# Patient Record
Sex: MALE | Race: WHITE | NOT HISPANIC OR LATINO | Employment: FULL TIME | URBAN - METROPOLITAN AREA
[De-identification: names, ages, dates, MRNs, and addresses within clinical notes are randomized per-mention and may not be internally consistent; named-entity substitution may affect disease eponyms.]

---

## 2017-01-13 ENCOUNTER — LAB REQUISITION (OUTPATIENT)
Dept: LAB | Facility: HOSPITAL | Age: 42
End: 2017-01-13
Payer: COMMERCIAL

## 2017-01-13 ENCOUNTER — ALLSCRIPTS OFFICE VISIT (OUTPATIENT)
Dept: OTHER | Facility: OTHER | Age: 42
End: 2017-01-13

## 2017-01-13 DIAGNOSIS — J02.9 ACUTE PHARYNGITIS: ICD-10-CM

## 2017-01-13 PROCEDURE — 87070 CULTURE OTHR SPECIMN AEROBIC: CPT | Performed by: FAMILY MEDICINE

## 2017-01-16 LAB — BACTERIA THROAT CULT: NORMAL

## 2017-07-05 ENCOUNTER — ALLSCRIPTS OFFICE VISIT (OUTPATIENT)
Dept: OTHER | Facility: OTHER | Age: 42
End: 2017-07-05

## 2018-01-12 VITALS
RESPIRATION RATE: 20 BRPM | DIASTOLIC BLOOD PRESSURE: 64 MMHG | OXYGEN SATURATION: 98 % | HEART RATE: 96 BPM | TEMPERATURE: 99.3 F | WEIGHT: 155 LBS | SYSTOLIC BLOOD PRESSURE: 112 MMHG

## 2018-01-14 VITALS
SYSTOLIC BLOOD PRESSURE: 115 MMHG | DIASTOLIC BLOOD PRESSURE: 59 MMHG | HEART RATE: 64 BPM | RESPIRATION RATE: 20 BRPM | TEMPERATURE: 98.1 F | OXYGEN SATURATION: 100 %

## 2018-06-13 ENCOUNTER — APPOINTMENT (OUTPATIENT)
Dept: RADIOLOGY | Facility: CLINIC | Age: 43
End: 2018-06-13
Attending: FAMILY MEDICINE
Payer: COMMERCIAL

## 2018-06-13 ENCOUNTER — OFFICE VISIT (OUTPATIENT)
Dept: URGENT CARE | Facility: CLINIC | Age: 43
End: 2018-06-13
Payer: COMMERCIAL

## 2018-06-13 VITALS
OXYGEN SATURATION: 100 % | TEMPERATURE: 97.5 F | DIASTOLIC BLOOD PRESSURE: 62 MMHG | HEART RATE: 66 BPM | WEIGHT: 134.4 LBS | SYSTOLIC BLOOD PRESSURE: 110 MMHG | BODY MASS INDEX: 17.81 KG/M2 | RESPIRATION RATE: 16 BRPM | HEIGHT: 73 IN

## 2018-06-13 DIAGNOSIS — M79.672 LEFT FOOT PAIN: ICD-10-CM

## 2018-06-13 DIAGNOSIS — S90.32XA CONTUSION OF LEFT HEEL, INITIAL ENCOUNTER: Primary | ICD-10-CM

## 2018-06-13 PROCEDURE — 99213 OFFICE O/P EST LOW 20 MIN: CPT | Performed by: FAMILY MEDICINE

## 2018-06-13 PROCEDURE — 73650 X-RAY EXAM OF HEEL: CPT

## 2018-06-13 RX ORDER — ALPRAZOLAM 1 MG/1
TABLET ORAL
Refills: 2 | COMMUNITY
Start: 2018-05-31 | End: 2019-12-28 | Stop reason: ALTCHOICE

## 2018-06-13 RX ORDER — BUPRENORPHINE HYDROCHLORIDE, NALOXONE HYDROCHLORIDE 8; 2 MG/1; MG/1
FILM, SOLUBLE BUCCAL; SUBLINGUAL
Refills: 0 | COMMUNITY
Start: 2018-05-14 | End: 2019-12-28 | Stop reason: ALTCHOICE

## 2018-06-13 NOTE — LETTER
June 13, 2018     Patient: Vika Maher   YOB: 1975   Date of Visit: 6/13/2018       To Whom it May Concern:    Vika Maher was seen in my clinic on 6/13/2018  If you have any questions or concerns, please don't hesitate to call           Sincerely,          Evelio Noe MD        CC: No Recipients

## 2018-06-13 NOTE — PATIENT INSTRUCTIONS
Left heel contusion   - xray shows no acute fracture   - I have recommended to rest the foot, keep it elevated, apply ice to the site, and take Tylenol or Motrin as needed for pain  - if symptoms persist despite treatment or worsen, follow up w/ pcp for re-check

## 2018-06-13 NOTE — PROGRESS NOTES
[facility]        NAME: Kate Man is a 38 y o  male  : 1975    MRN: [id number]  DATE: 2018  TIME: 10:16 AM    Assessment and Plan   Contusion of left heel, initial encounter [S90 32XA]  1  Contusion of left heel, initial encounter     2  Left foot pain  XR heel / calcaneus 2+ vw left         Patient Instructions     Patient Instructions   Left heel contusion   - xray shows no acute fracture   - I have recommended to rest the foot, keep it elevated, apply ice to the site, and take Tylenol or Motrin as needed for pain  - if symptoms persist despite treatment or worsen, follow up w/ pcp for re-check     Follow up with PCP in 3-5 days  Proceed to  ER if symptoms worsen  Chief Complaint     Chief Complaint   Patient presents with    Foot Injury     Pt here for left foot injury from yesterday, pt states he kicked a tire, Pt states swelling and discoloration of left heel, pain  Pt used no meds  History of Present Illness       39 yo male presents c/o L heel pain  Patient states he was changing his tire yesterday and kicked the tire with his heel and since then he has pain, swelling, and bruising of the heel area  He has pain w/ walking and bearing weight on it  No ankle pain, swelling, or bruising  He has been applying ice to the site  No numbness/tingling or weakness of the foot  Review of Systems   Review of Systems   Constitutional: Negative  Musculoskeletal:        As noted in HPI   Skin:        As noted in HPI   Neurological: Negative            Current Medications       Current Outpatient Prescriptions:     ALPRAZolam (XANAX) 1 mg tablet, TK 1 T PO BID PRN, Disp: , Rfl: 2    SUBOXONE 8-2 MG, DIS 1 FILM UNDER THE TONGUE TID, Disp: , Rfl: 0    Current Allergies     Allergies as of 2018    (No Known Allergies)            The following portions of the patient's history were reviewed and updated as appropriate: allergies, current medications, past family history, past medical history, past social history, past surgical history and problem list      History reviewed  No pertinent past medical history  Past Surgical History:   Procedure Laterality Date    ELBOW ARTHROSCOPY         History reviewed  No pertinent family history  Medications have been verified  Objective   /62 (BP Location: Right arm, Patient Position: Sitting, Cuff Size: Standard)   Pulse 66   Temp 97 5 °F (36 4 °C) (Tympanic)   Resp 16   Ht 6' 1" (1 854 m)   Wt 61 kg (134 lb 6 4 oz)   SpO2 100%   BMI 17 73 kg/m²        Physical Exam     Physical Exam   Constitutional: He is oriented to person, place, and time  Vital signs are normal  He appears well-developed and well-nourished  He is active and cooperative  Non-toxic appearance  He does not have a sickly appearance  He does not appear ill  No distress  Musculoskeletal:   Left foot: + localized area of tenderness, swelling, and bruising on the left heel  Remainder of the foot exam is WNL  Ankle w/ full ROM, no pain or difficulty w/ movement  Neurological: He is alert and oriented to person, place, and time  Skin: Skin is warm, dry and intact  Bruising and ecchymosis noted  He is not diaphoretic  Psychiatric: He has a normal mood and affect  His behavior is normal  Judgment and thought content normal    Nursing note and vitals reviewed

## 2019-02-20 ENCOUNTER — OFFICE VISIT (OUTPATIENT)
Dept: URGENT CARE | Facility: CLINIC | Age: 44
End: 2019-02-20
Payer: COMMERCIAL

## 2019-02-20 VITALS
RESPIRATION RATE: 16 BRPM | WEIGHT: 151.4 LBS | SYSTOLIC BLOOD PRESSURE: 126 MMHG | BODY MASS INDEX: 20.06 KG/M2 | TEMPERATURE: 98.8 F | OXYGEN SATURATION: 100 % | HEART RATE: 84 BPM | HEIGHT: 73 IN | DIASTOLIC BLOOD PRESSURE: 66 MMHG

## 2019-02-20 DIAGNOSIS — J11.1 INFLUENZA: Primary | ICD-10-CM

## 2019-02-20 PROBLEM — J30.9 ALLERGIC RHINITIS DUE TO ALLERGEN: Status: ACTIVE | Noted: 2018-10-01

## 2019-02-20 PROBLEM — G43.809 HEADACHE, VARIANT MIGRAINE: Status: ACTIVE | Noted: 2018-10-01

## 2019-02-20 PROCEDURE — 99213 OFFICE O/P EST LOW 20 MIN: CPT | Performed by: FAMILY MEDICINE

## 2019-02-20 RX ORDER — BUPROPION HYDROCHLORIDE 150 MG/1
TABLET, EXTENDED RELEASE ORAL
Refills: 3 | COMMUNITY
Start: 2019-02-05 | End: 2019-12-28 | Stop reason: ALTCHOICE

## 2019-02-20 RX ORDER — OSELTAMIVIR PHOSPHATE 75 MG/1
75 CAPSULE ORAL 2 TIMES DAILY
Qty: 10 CAPSULE | Refills: 0 | Status: SHIPPED | OUTPATIENT
Start: 2019-02-20 | End: 2019-02-25

## 2019-02-20 RX ORDER — MIRTAZAPINE 15 MG/1
TABLET, FILM COATED ORAL
COMMUNITY
Start: 2018-11-27 | End: 2019-12-28 | Stop reason: ALTCHOICE

## 2019-02-20 NOTE — PROGRESS NOTES
[facility]        NAME: Alyson Saenz is a 41 y o  male  : 1975    MRN: [id number]  DATE: 2019  TIME: 5:14 PM    Assessment and Plan   Influenza [J11 1]  1  Influenza  oseltamivir (TAMIFLU) 75 mg capsule         Patient Instructions     Patient Instructions     1  Influenza   - Tamiflu prescribed, complete as directed, side effects discussed, appropriate warnings given   - rest and drink plenty of fluids  - take Tylenol or Motrin as needed   - try warm salt water gargles and throat lozenges as needed  - run a humidifier at home   - if symptoms persist despite treatment, worsen, or any new symptoms present, should be seen in the ER     Influenza   AMBULATORY CARE:   Influenza  (the flu) is an infection caused by the influenza virus  The flu is easily spread when an infected person coughs, sneezes, or has close contact with others  You may be able to spread the flu to others for 1 week or longer after signs or symptoms appear  Common signs and symptoms include the following:   · Fever and chills    · Headaches, body aches, and muscle or joint pain    · Cough, runny nose, and sore throat    · Loss of appetite, nausea, vomiting, or diarrhea    · Tiredness    · Trouble breathing  Call 911 for any of the following:   · You have trouble breathing, and your lips look purple or blue  · You have a seizure  Seek care immediately if:   · You are dizzy, or you are urinating less or not at all  · You have a headache with a stiff neck, and you feel tired or confused  · You have new pain or pressure in your chest     · Your symptoms, such as shortness of breath, vomiting, or diarrhea, get worse  · Your symptoms, such as fever and coughing, seem to get better, but then get worse  Contact your healthcare provider if:   · You have new muscle pain or weakness  · You have questions or concerns about your condition or care    Treatment for influenza  may include any of the following:  · Acetaminophen  decreases pain and fever  It is available without a doctor's order  Ask how much to take and how often to take it  Follow directions  Acetaminophen can cause liver damage if not taken correctly  · NSAIDs , such as ibuprofen, help decrease swelling, pain, and fever  This medicine is available with or without a doctor's order  NSAIDs can cause stomach bleeding or kidney problems in certain people  If you take blood thinner medicine, always ask your healthcare provider if NSAIDs are safe for you  Always read the medicine label and follow directions  · Antivirals  help fight a viral infection  Manage your symptoms:   · Rest  as much as you can to help you recover  · Drink liquids as directed  to help prevent dehydration  Ask how much liquid to drink each day and which liquids are best for you  Prevent the spread of the flu:   · Wash your hands often  Use soap and water  Wash your hands after you use the bathroom, change a child's diapers, or sneeze  Wash your hands before you prepare or eat food  Use gel hand cleanser when soap and water are not available  Do not touch your eyes, nose, or mouth unless you have washed your hands first            · Cover your mouth when you sneeze or cough  Cough into a tissue or the bend of your arm  · Clean shared items with a germ-killing   Clean table surfaces, doorknobs, and light switches  Do not share towels, silverware, and dishes with people who are sick  Wash bed sheets, towels, silverware, and dishes with soap and water  · Wear a mask  over your mouth and nose if you are sick or are near anyone who is sick  · Stay away from others  if you are sick  · Influenza vaccine  helps prevent influenza (flu)  Everyone older than 6 months should get a yearly influenza vaccine  Get the vaccine as soon as it is available, usually in September or October each year    Follow up with your healthcare provider as directed:  Write down your questions so you remember to ask them during your visits  2017 [address] Information is for End User's use only and may not be sold, redistributed or otherwise used for commercial purposes  All illustrations and images included in CareNotes® are the copyrighted property of [employer]  or [employer]  The above information is an  only  It is not intended as medical advice for individual conditions or treatments  Talk to your doctor, nurse or pharmacist before following any medical regimen to see if it is safe and effective for you  Follow up with PCP in 3-5 days  Proceed to  ER if symptoms worsen  Chief Complaint     Chief Complaint   Patient presents with    Cold Like Symptoms     Pt here ill x 1 day pt states sore throat, fever  101 at times,  bodyaches, stuffy nose, cough, headache  Pt used no meds  Pt has concerns for a   he is fostering  History of Present Illness       40 yo male presents c/o fever of 101 last night and this am, chills, body aches, nasal congestion, rhinorrhea, sore throat, and productive cough  Symptoms have been ongoing x 1 day  No chest pain, SOB, or wheezing  Patient is a smoker  No GI sx  No skin rashes  He did not get the flu vaccine this year  He has been taking Motrin as needed  He states he currently has a  at home under their foster care  Review of Systems   Review of Systems   Constitutional:        As noted in HPI   HENT:        As noted in HPI   Eyes: Negative  Respiratory:        As noted in HPI   Cardiovascular: Negative  Gastrointestinal: Negative  Musculoskeletal:        As noted in HPI   Skin: Negative  Neurological: Negative  Psychiatric/Behavioral: Negative            Current Medications       Current Outpatient Medications:     ALPRAZolam (XANAX) 1 mg tablet, TK 1 T PO BID PRN, Disp: , Rfl: 2    buPROPion (WELLBUTRIN SR) 150 mg 12 hr tablet, TK 1 T PO BID, Disp: , Rfl: 3    mirtazapine (REMERON) 15 mg tablet, TAKE 1 TABLET ONCE DAILY INTHE EVENING BEFORE BEDTIME, Disp: , Rfl:     SUBOXONE 8-2 MG, DIS 1 FILM UNDER THE TONGUE TID, Disp: , Rfl: 0    oseltamivir (TAMIFLU) 75 mg capsule, Take 1 capsule (75 mg total) by mouth 2 (two) times a day for 5 days, Disp: 10 capsule, Rfl: 0    Current Allergies     Allergies as of 02/20/2019    (No Known Allergies)            The following portions of the patient's history were reviewed and updated as appropriate: allergies, current medications, past family history, past medical history, past social history, past surgical history and problem list      Past Medical History:   Diagnosis Date    Allergic rhinitis due to allergen 10/1/2018    Anxiety     Chronic pain     Depression 10/17/2016       Past Surgical History:   Procedure Laterality Date    ELBOW ARTHROSCOPY      ELBOW SURGERY      x2    WRIST SURGERY         History reviewed  No pertinent family history  Medications have been verified  Objective   /66 (BP Location: Right arm, Patient Position: Sitting, Cuff Size: Standard)   Pulse 84   Temp 98 8 °F (37 1 °C) (Tympanic)   Resp 16   Ht 6' 1" (1 854 m)   Wt 68 7 kg (151 lb 6 4 oz)   SpO2 100%   BMI 19 97 kg/m²        Physical Exam     Physical Exam   Constitutional: He is oriented to person, place, and time  Vital signs are normal  He appears well-developed and well-nourished  He is active  Non-toxic appearance  He does not have a sickly appearance  He appears ill  No distress  HENT:   Head: Normocephalic and atraumatic  Right Ear: Tympanic membrane, external ear and ear canal normal    Left Ear: Tympanic membrane, external ear and ear canal normal    Nose: Nose normal    Mouth/Throat: Uvula is midline and mucous membranes are normal  Posterior oropharyngeal erythema present  No oropharyngeal exudate, posterior oropharyngeal edema or tonsillar abscesses     Eyes: Pupils are equal, round, and reactive to light  Conjunctivae and EOM are normal    Neck: Normal range of motion  Neck supple  Cardiovascular: Normal rate, regular rhythm and normal heart sounds  Pulmonary/Chest: Effort normal and breath sounds normal  No respiratory distress  Neurological: He is alert and oriented to person, place, and time  Skin: He is not diaphoretic  Psychiatric: He has a normal mood and affect  His behavior is normal  Judgment and thought content normal    Nursing note and vitals reviewed

## 2019-02-20 NOTE — PATIENT INSTRUCTIONS
1  Influenza   - Tamiflu prescribed, complete as directed, side effects discussed, appropriate warnings given   - rest and drink plenty of fluids  - take Tylenol or Motrin as needed   - try warm salt water gargles and throat lozenges as needed  - run a humidifier at home   - if symptoms persist despite treatment, worsen, or any new symptoms present, should be seen in the ER     Influenza   AMBULATORY CARE:   Influenza  (the flu) is an infection caused by the influenza virus  The flu is easily spread when an infected person coughs, sneezes, or has close contact with others  You may be able to spread the flu to others for 1 week or longer after signs or symptoms appear  Common signs and symptoms include the following:   · Fever and chills    · Headaches, body aches, and muscle or joint pain    · Cough, runny nose, and sore throat    · Loss of appetite, nausea, vomiting, or diarrhea    · Tiredness    · Trouble breathing  Call 911 for any of the following:   · You have trouble breathing, and your lips look purple or blue  · You have a seizure  Seek care immediately if:   · You are dizzy, or you are urinating less or not at all  · You have a headache with a stiff neck, and you feel tired or confused  · You have new pain or pressure in your chest     · Your symptoms, such as shortness of breath, vomiting, or diarrhea, get worse  · Your symptoms, such as fever and coughing, seem to get better, but then get worse  Contact your healthcare provider if:   · You have new muscle pain or weakness  · You have questions or concerns about your condition or care  Treatment for influenza  may include any of the following:  · Acetaminophen  decreases pain and fever  It is available without a doctor's order  Ask how much to take and how often to take it  Follow directions  Acetaminophen can cause liver damage if not taken correctly  · NSAIDs , such as ibuprofen, help decrease swelling, pain, and fever   This medicine is available with or without a doctor's order  NSAIDs can cause stomach bleeding or kidney problems in certain people  If you take blood thinner medicine, always ask your healthcare provider if NSAIDs are safe for you  Always read the medicine label and follow directions  · Antivirals  help fight a viral infection  Manage your symptoms:   · Rest  as much as you can to help you recover  · Drink liquids as directed  to help prevent dehydration  Ask how much liquid to drink each day and which liquids are best for you  Prevent the spread of the flu:   · Wash your hands often  Use soap and water  Wash your hands after you use the bathroom, change a child's diapers, or sneeze  Wash your hands before you prepare or eat food  Use gel hand cleanser when soap and water are not available  Do not touch your eyes, nose, or mouth unless you have washed your hands first            · Cover your mouth when you sneeze or cough  Cough into a tissue or the bend of your arm  · Clean shared items with a germ-killing   Clean table surfaces, doorknobs, and light switches  Do not share towels, silverware, and dishes with people who are sick  Wash bed sheets, towels, silverware, and dishes with soap and water  · Wear a mask  over your mouth and nose if you are sick or are near anyone who is sick  · Stay away from others  if you are sick  · Influenza vaccine  helps prevent influenza (flu)  Everyone older than 6 months should get a yearly influenza vaccine  Get the vaccine as soon as it is available, usually in September or October each year  Follow up with your healthcare provider as directed:  Write down your questions so you remember to ask them during your visits  © 2017 [address] [employer] Information is for End User's use only and may not be sold, redistributed or otherwise used for commercial purposes   All illustrations and images included in CareNotes® are the copyrighted property of [employer]  or Donovan Garcia  The above information is an  only  It is not intended as medical advice for individual conditions or treatments  Talk to your doctor, nurse or pharmacist before following any medical regimen to see if it is safe and effective for you

## 2019-12-28 ENCOUNTER — OFFICE VISIT (OUTPATIENT)
Dept: URGENT CARE | Facility: CLINIC | Age: 44
End: 2019-12-28
Payer: COMMERCIAL

## 2019-12-28 VITALS
HEART RATE: 71 BPM | SYSTOLIC BLOOD PRESSURE: 108 MMHG | TEMPERATURE: 97.1 F | DIASTOLIC BLOOD PRESSURE: 64 MMHG | RESPIRATION RATE: 16 BRPM | OXYGEN SATURATION: 100 % | WEIGHT: 161 LBS | BODY MASS INDEX: 21.24 KG/M2

## 2019-12-28 DIAGNOSIS — J11.1 INFLUENZA-LIKE ILLNESS: Primary | ICD-10-CM

## 2019-12-28 PROCEDURE — 99213 OFFICE O/P EST LOW 20 MIN: CPT | Performed by: NURSE PRACTITIONER

## 2019-12-28 RX ORDER — BUPRENORPHINE AND NALOXONE 8; 2 MG/1; MG/1
FILM, SOLUBLE BUCCAL; SUBLINGUAL
COMMUNITY
Start: 2018-05-14

## 2019-12-28 RX ORDER — OSELTAMIVIR PHOSPHATE 75 MG/1
75 CAPSULE ORAL EVERY 12 HOURS SCHEDULED
Qty: 10 CAPSULE | Refills: 0 | Status: SHIPPED | OUTPATIENT
Start: 2019-12-28 | End: 2020-01-02

## 2019-12-28 RX ORDER — BUPROPION HYDROCHLORIDE 150 MG/1
TABLET, EXTENDED RELEASE ORAL
COMMUNITY
Start: 2019-04-30 | End: 2021-08-18 | Stop reason: ALTCHOICE

## 2019-12-28 RX ORDER — MIRTAZAPINE 15 MG/1
TABLET, FILM COATED ORAL
COMMUNITY
Start: 2019-11-11

## 2019-12-28 RX ORDER — ALBUTEROL SULFATE 90 UG/1
AEROSOL, METERED RESPIRATORY (INHALATION)
Refills: 3 | COMMUNITY
Start: 2019-09-30

## 2019-12-28 RX ORDER — ALPRAZOLAM 1 MG/1
TABLET ORAL
COMMUNITY
Start: 2019-10-14

## 2019-12-28 NOTE — LETTER
December 28, 2019     Patient: Andrez Rivera   YOB: 1975   Date of Visit: 12/28/2019       To Whom It May Concern: It is my medical opinion that Andrez Rivera should remain out of work until 1/1/2020  If you have any questions or concerns, please don't hesitate to call           Sincerely,        LAUREN Valentine    CC: Andrez Rivera

## 2019-12-28 NOTE — PATIENT INSTRUCTIONS
You have been diagnosed with a flu like illness  Tamiflu 75mg twice daily for 5 days  Rest, fluids, tylenol/ibuprofen as needed  Symptomatic treatment  Antibiotics are not indicated at this time  Follow up with PCP in 5-7 days if symptoms not improving

## 2019-12-28 NOTE — PROGRESS NOTES
[facility]        NAME: Sylvia Zelaya is a 45 y o  male  : 1975    MRN: [id number]  DATE: 2019  TIME: 3:30 PM    Assessment and Plan     1  Influenza-like illness  - oseltamivir (TAMIFLU) 75 mg capsule; Take 1 capsule (75 mg total) by mouth every 12 (twelve) hours for 5 days  Dispense: 10 capsule; Refill: 0  Supportive care  Offered flu swab, pt declined  Patient Instructions     You have been diagnosed with a flu like illness  Tamiflu 75mg twice daily for 5 days  Rest, fluids, tylenol/ibuprofen as needed  Symptomatic treatment  Antibiotics are not indicated at this time  Follow up with PCP in 5-7 days if symptoms not improving  Chief Complaint     Chief Complaint   Patient presents with    Cold Like Symptoms     cough, bodyaches, fever, sore throat,sinus/head congestion, PND; started 2 days ago         History of Present Illness       Symptoms started 2 days ago  Sudden onset  Fever, temp max 102 4  Body aches  Nasal congestion  Cough  Headache  Denies n/v/d  Took dayquil and nyquil  Did not get flu shot this year  Review of Systems   Review of Systems   Constitutional: Positive for fatigue and fever  HENT: Positive for congestion, postnasal drip, rhinorrhea, sinus pressure and sore throat  Negative for sinus pain  Respiratory: Positive for cough  Negative for shortness of breath  Gastrointestinal: Negative for diarrhea, nausea and vomiting  Musculoskeletal: Positive for myalgias  Neurological: Positive for headaches  Negative for dizziness  Hematological: Negative for adenopathy           Current Medications       Current Outpatient Medications:     albuterol (PROVENTIL HFA,VENTOLIN HFA) 90 mcg/act inhaler, INL 2 PFS PO Q 4 H PRF WHZ, Disp: , Rfl: 3    ALPRAZolam (XANAX) 1 mg tablet, TAKE 1 TABLET BY MOUTH THREE TIMES A DAY AS NEEDED, Disp: , Rfl:     buprenorphine-naloxone (SUBOXONE) 8-2 mg, DIS 1 FILM UNDER THE TONGUE TID, Disp: , Rfl:     buPROPion (WELLBUTRIN SR) 150 mg 12 hr tablet, TAKE 1 TABLET BY MOUTH TWICE DAILY, Disp: , Rfl:     mirtazapine (REMERON) 15 mg tablet, TAKE 1 TABLET ONCE DAILY INTHE EVENING BEFORE BEDTIME, Disp: , Rfl:     Current Allergies     Allergies as of 12/28/2019    (No Known Allergies)            The following portions of the patient's history were reviewed and updated as appropriate: allergies, current medications, past family history, past medical history, past social history, past surgical history and problem list      Past Medical History:   Diagnosis Date    Allergic rhinitis due to allergen 10/1/2018    Anxiety     Chronic pain     Depression 10/17/2016       Past Surgical History:   Procedure Laterality Date    ELBOW ARTHROSCOPY      ELBOW SURGERY      x2    WRIST SURGERY         History reviewed  No pertinent family history  Medications have been verified  Objective   /64   Pulse 71   Temp (!) 97 1 °F (36 2 °C)   Resp 16   Wt 73 kg (161 lb)   SpO2 100%   BMI 21 24 kg/m²        Physical Exam     Physical Exam   Constitutional: He is oriented to person, place, and time  Ill appearing   HENT:   TMS WNL  Turbinates inflamed  Oropharynx with no erythema or exudate  No sinus tenderness to palpation    (+) PND     Cardiovascular: Normal rate  Pulmonary/Chest: Effort normal and breath sounds normal    Lymphadenopathy:     He has no cervical adenopathy  Neurological: He is alert and oriented to person, place, and time  Skin: Skin is warm and dry  Vitals reviewed

## 2021-06-22 ENCOUNTER — OFFICE VISIT (OUTPATIENT)
Dept: URGENT CARE | Facility: CLINIC | Age: 46
End: 2021-06-22
Payer: COMMERCIAL

## 2021-06-22 VITALS
RESPIRATION RATE: 16 BRPM | TEMPERATURE: 97.7 F | WEIGHT: 172 LBS | BODY MASS INDEX: 22.8 KG/M2 | HEART RATE: 75 BPM | OXYGEN SATURATION: 99 % | HEIGHT: 73 IN

## 2021-06-22 DIAGNOSIS — J06.9 ACUTE URI: Primary | ICD-10-CM

## 2021-06-22 DIAGNOSIS — Z20.822 PERSON UNDER INVESTIGATION FOR COVID-19: ICD-10-CM

## 2021-06-22 PROCEDURE — U0005 INFEC AGEN DETEC AMPLI PROBE: HCPCS | Performed by: FAMILY MEDICINE

## 2021-06-22 PROCEDURE — 99213 OFFICE O/P EST LOW 20 MIN: CPT | Performed by: FAMILY MEDICINE

## 2021-06-22 PROCEDURE — U0003 INFECTIOUS AGENT DETECTION BY NUCLEIC ACID (DNA OR RNA); SEVERE ACUTE RESPIRATORY SYNDROME CORONAVIRUS 2 (SARS-COV-2) (CORONAVIRUS DISEASE [COVID-19]), AMPLIFIED PROBE TECHNIQUE, MAKING USE OF HIGH THROUGHPUT TECHNOLOGIES AS DESCRIBED BY CMS-2020-01-R: HCPCS | Performed by: FAMILY MEDICINE

## 2021-06-22 RX ORDER — ESCITALOPRAM OXALATE 10 MG/1
TABLET ORAL
COMMUNITY
Start: 2021-01-15

## 2021-06-22 RX ORDER — FLUTICASONE PROPIONATE 50 MCG
1 SPRAY, SUSPENSION (ML) NASAL DAILY
Qty: 9.9 ML | Refills: 0 | Status: SHIPPED | OUTPATIENT
Start: 2021-06-22 | End: 2021-08-18 | Stop reason: ALTCHOICE

## 2021-06-22 RX ORDER — BENZONATATE 200 MG/1
200 CAPSULE ORAL 3 TIMES DAILY PRN
Qty: 20 CAPSULE | Refills: 0 | Status: SHIPPED | OUTPATIENT
Start: 2021-06-22 | End: 2021-08-18 | Stop reason: ALTCHOICE

## 2021-06-22 RX ORDER — FREMANEZUMAB-VFRM 225 MG/1.5ML
INJECTION SUBCUTANEOUS
COMMUNITY
Start: 2021-04-06

## 2021-06-22 RX ORDER — TOPIRAMATE 100 MG/1
CAPSULE, EXTENDED RELEASE ORAL
COMMUNITY
Start: 2021-04-01

## 2021-06-22 NOTE — PATIENT INSTRUCTIONS
Acute viral URI (common cold)  - rest and drink plenty of fluids  - take Tylenol or Motrin as needed   - advised warm salt water gargles and throat lozenges as needed   - drink warm tea w/ lemon and honey   - run a humidifier at home   - advised using Flonase nasal spray   - take Tessalon pearls as needed for cough   - COVID-19 test performed in office today, results will be available in 2 days  - patient was informed that he must remain at home on self isolation until test results return, if positive isolation timeline will be extended  - follow up w/ PCP for re-check in 3-5 days   - if symptoms persist despite treatment, worsen, or any new symptoms present, should be seen by PCP for re-check

## 2021-06-22 NOTE — PROGRESS NOTES
[facility]        NAME: Melissa Travis is a 39 y o  male  : 1975    MRN: [id number]  DATE: 2021  TIME: 1:40 PM    Assessment and Plan   Acute URI [J06 9]  1  Acute URI  Novel Coronavirus (Covid-19),[facility]    fluticasone (FLONASE) 50 mcg/act nasal spray    benzonatate (TESSALON) 200 MG capsule   2  Person under investigation for COVID-19           Patient Instructions     Patient Instructions   Acute viral URI (common cold)  - rest and drink plenty of fluids  - take Tylenol or Motrin as needed   - advised warm salt water gargles and throat lozenges as needed   - drink warm tea w/ lemon and honey   - run a humidifier at home   - advised using Flonase nasal spray   - take Tessalon pearls as needed for cough   - COVID-19 test performed in office today, results will be available in 2 days  - patient was informed that he must remain at home on self isolation until test results return, if positive isolation timeline will be extended  - follow up w/ PCP for re-check in 3-5 days   - if symptoms persist despite treatment, worsen, or any new symptoms present, should be seen by PCP for re-check  Follow up with PCP in 3-5 days  Proceed to  ER if symptoms worsen  Chief Complaint     Chief Complaint   Patient presents with    Cold Like Symptoms         History of Present Illness       38 yo male presents c/o nasal congestion, rhinorrhea, post-nasal drip, sore throat, and a productive cough  He has been ill x 4 days  No fever/chills  No headache or body aches  No chest pain, SOB, or wheezing  Non-smoker  No GI sx  No skin rashes  No loss of taste or smell  He denies any recent travel or known exposure to anyone with COVID-19  He has not received the COVID-19 vaccine  He has been taking NyQuil as needed  Review of Systems   Review of Systems   Constitutional: Negative  HENT:        As noted in HPI   Eyes: Negative      Respiratory:        As noted in HPI Cardiovascular: Negative  Gastrointestinal: Negative  Musculoskeletal: Negative  Skin: Negative  Allergic/Immunologic: Negative  Neurological: Negative  Hematological: Negative            Current Medications       Current Outpatient Medications:     Ajovy 225 MG/1 5ML auto-injector, INJECT (225MG) BY SUBCUTANEOUS ROUTE EVERY MONTH IN THE ABDOMEN, THIGH, OR UPPER ARM, Disp: , Rfl:     albuterol (PROVENTIL HFA,VENTOLIN HFA) 90 mcg/act inhaler, INL 2 PFS PO Q 4 H PRF WHZ, Disp: , Rfl: 3    ALPRAZolam (XANAX) 1 mg tablet, TAKE 1 TABLET BY MOUTH THREE TIMES A DAY AS NEEDED, Disp: , Rfl:     escitalopram (LEXAPRO) 10 mg tablet, TAKE 1 TABLET DAILY, Disp: , Rfl:     mirtazapine (REMERON) 15 mg tablet, TAKE 1 TABLET ONCE DAILY INTHE EVENING BEFORE BEDTIME, Disp: , Rfl:     Trokendi  MG CP24, TAKE 1 CAPSULE BY ORAL ROUTE EVERY DAY AT NIGHT, Disp: , Rfl:     benzonatate (TESSALON) 200 MG capsule, Take 1 capsule (200 mg total) by mouth 3 (three) times a day as needed for cough, Disp: 20 capsule, Rfl: 0    buprenorphine-naloxone (SUBOXONE) 8-2 mg, DIS 1 FILM UNDER THE TONGUE TID (Patient not taking: Reported on 6/22/2021), Disp: , Rfl:     buPROPion (WELLBUTRIN SR) 150 mg 12 hr tablet, TAKE 1 TABLET BY MOUTH TWICE DAILY (Patient not taking: Reported on 6/22/2021), Disp: , Rfl:     fluticasone (FLONASE) 50 mcg/act nasal spray, 1 spray into each nostril daily, Disp: 9 9 mL, Rfl: 0    Current Allergies     Allergies as of 06/22/2021    (No Known Allergies)            The following portions of the patient's history were reviewed and updated as appropriate: allergies, current medications, past family history, past medical history, past social history, past surgical history and problem list      Past Medical History:   Diagnosis Date    Allergic rhinitis due to allergen 10/1/2018    Anxiety     Chronic pain     Depression 10/17/2016       Past Surgical History:   Procedure Laterality Date    ELBOW ARTHROSCOPY      ELBOW SURGERY      x2    WRIST SURGERY         History reviewed  No pertinent family history  Medications have been verified  Objective   Pulse 75   Temp 97 7 °F (36 5 °C) (Tympanic)   Resp 16   Ht 6' 1" (1 854 m)   Wt 78 kg (172 lb)   SpO2 99%   BMI 22 69 kg/m²   No LMP for male patient  Physical Exam     Physical Exam  Vitals and nursing note reviewed  Constitutional:       General: He is awake  He is not in acute distress  Appearance: Normal appearance  He is well-developed, well-groomed and normal weight  He is not ill-appearing, toxic-appearing or diaphoretic  HENT:      Head: Normocephalic and atraumatic  Right Ear: Tympanic membrane, ear canal and external ear normal       Left Ear: Tympanic membrane, ear canal and external ear normal       Nose: Mucosal edema and congestion present  Mouth/Throat:      Lips: Pink  Mouth: Mucous membranes are moist       Pharynx: Oropharynx is clear  Uvula midline  Cardiovascular:      Rate and Rhythm: Normal rate and regular rhythm  Pulses: Normal pulses  Heart sounds: Normal heart sounds  Pulmonary:      Effort: Pulmonary effort is normal  No tachypnea, accessory muscle usage or respiratory distress  Breath sounds: Normal breath sounds and air entry  Musculoskeletal:      Cervical back: Normal range of motion and neck supple  No rigidity or tenderness  Lymphadenopathy:      Cervical: No cervical adenopathy  Skin:     General: Skin is warm and dry  Coloration: Skin is not pale  Neurological:      Mental Status: He is alert and oriented to person, place, and time  Mental status is at baseline  Psychiatric:         Attention and Perception: Attention and perception normal          Mood and Affect: Mood and affect normal          Speech: Speech normal          Behavior: Behavior normal  Behavior is cooperative  Thought Content:  Thought content normal  Cognition and Memory: Cognition and memory normal          Judgment: Judgment normal

## 2021-06-23 LAB — SARS-COV-2 RNA RESP QL NAA+PROBE: NEGATIVE

## 2021-08-18 ENCOUNTER — APPOINTMENT (OUTPATIENT)
Dept: RADIOLOGY | Facility: CLINIC | Age: 46
End: 2021-08-18
Attending: FAMILY MEDICINE
Payer: COMMERCIAL

## 2021-08-18 ENCOUNTER — OFFICE VISIT (OUTPATIENT)
Dept: URGENT CARE | Facility: CLINIC | Age: 46
End: 2021-08-18
Payer: COMMERCIAL

## 2021-08-18 VITALS
SYSTOLIC BLOOD PRESSURE: 128 MMHG | DIASTOLIC BLOOD PRESSURE: 74 MMHG | HEIGHT: 73 IN | TEMPERATURE: 97.6 F | BODY MASS INDEX: 22.69 KG/M2 | RESPIRATION RATE: 20 BRPM | HEART RATE: 80 BPM | OXYGEN SATURATION: 97 %

## 2021-08-18 DIAGNOSIS — J20.8 ACUTE BACTERIAL BRONCHITIS: Primary | ICD-10-CM

## 2021-08-18 DIAGNOSIS — B96.89 ACUTE BACTERIAL BRONCHITIS: Primary | ICD-10-CM

## 2021-08-18 DIAGNOSIS — R05.9 COUGH: ICD-10-CM

## 2021-08-18 PROCEDURE — U0003 INFECTIOUS AGENT DETECTION BY NUCLEIC ACID (DNA OR RNA); SEVERE ACUTE RESPIRATORY SYNDROME CORONAVIRUS 2 (SARS-COV-2) (CORONAVIRUS DISEASE [COVID-19]), AMPLIFIED PROBE TECHNIQUE, MAKING USE OF HIGH THROUGHPUT TECHNOLOGIES AS DESCRIBED BY CMS-2020-01-R: HCPCS | Performed by: FAMILY MEDICINE

## 2021-08-18 PROCEDURE — 99213 OFFICE O/P EST LOW 20 MIN: CPT | Performed by: FAMILY MEDICINE

## 2021-08-18 PROCEDURE — U0005 INFEC AGEN DETEC AMPLI PROBE: HCPCS | Performed by: FAMILY MEDICINE

## 2021-08-18 PROCEDURE — 71046 X-RAY EXAM CHEST 2 VIEWS: CPT

## 2021-08-18 RX ORDER — UMECLIDINIUM BROMIDE AND VILANTEROL TRIFENATATE 62.5; 25 UG/1; UG/1
POWDER RESPIRATORY (INHALATION)
COMMUNITY
Start: 2021-07-12

## 2021-08-18 RX ORDER — METHYLPREDNISOLONE 4 MG/1
TABLET ORAL
Qty: 21 TABLET | Refills: 0 | Status: SHIPPED | OUTPATIENT
Start: 2021-08-18

## 2021-08-18 RX ORDER — AMOXICILLIN AND CLAVULANATE POTASSIUM 875; 125 MG/1; MG/1
1 TABLET, FILM COATED ORAL EVERY 12 HOURS SCHEDULED
Qty: 14 TABLET | Refills: 0 | Status: SHIPPED | OUTPATIENT
Start: 2021-08-18 | End: 2021-08-25

## 2021-08-18 RX ORDER — BENZONATATE 200 MG/1
200 CAPSULE ORAL 3 TIMES DAILY PRN
Qty: 20 CAPSULE | Refills: 0 | Status: SHIPPED | OUTPATIENT
Start: 2021-08-18

## 2021-08-18 RX ORDER — FLUTICASONE PROPIONATE 50 MCG
1 SPRAY, SUSPENSION (ML) NASAL DAILY
Qty: 9.9 ML | Refills: 0 | Status: SHIPPED | OUTPATIENT
Start: 2021-08-18

## 2021-08-18 NOTE — PROGRESS NOTES
[facility] [facility]        NAME: Adonis Conway is a 42 y o  male  : 1975    MRN: [id number]  DATE: 2021  TIME: 4:00 PM    Assessment and Plan   Acute bacterial bronchitis [J20 8, B96 89]  1  Acute bacterial bronchitis  XR chest pa & lateral    amoxicillin-clavulanate (AUGMENTIN) 875-125 mg per tablet    methylPREDNISolone 4 MG tablet therapy pack    benzonatate (TESSALON) 200 MG capsule    fluticasone (FLONASE) 50 mcg/act nasal spray    Novel Coronavirus (Covid-19),PCR [facility] - Office Collection         Patient Instructions     Patient Instructions   1  Acute Bronchitis  - CXR shows no active disease  - COVID-19 test performed in office today, patient was informed that he must remain at home on self isolation until test results return  - Augmentin x 7 days prescribed, to be completed as directed   - Medrol dose pack prescribed, to be completed as directed   - continue Albuterol inhaler as needed for wheezing  - Tessalon pearls prescribed to be taken as needed for cough   - advised to use Flonase nasal spray for sinus symptoms   - take Tylenol or Motrin as needed   - drink plenty of fluids and run a humidifier at home   - try warm salt water gargles and throat lozenges as needed   - follow up w/ PCP for re-check in 3-5 days   - if symptoms persist despite treatment, worsen, or any new symptoms present, patient is to be seen in the ER  Follow up with PCP in 3-5 days  Proceed to  ER if symptoms worsen  Chief Complaint     Chief Complaint   Patient presents with    Cold Like Symptoms     x 2 weeks         History of Present Illness       41 yo male presents complaining of nasal congestion, nasal drainage, sore throat, productive cough, and chest congestion  He has been ill x 2 weeks  No fever, Tmax-100 0  He has felt chills and night sweats  No chest pain or SOB, but he has noted wheezing  Patient is an everyday smoker  He has a diagnosis of emphysema  No loss of taste or smell   No GI symptoms  No skin rashes  He denies any recent travel or known exposure to anyone with COVID-19  He has not received the COVID-19 vaccine  He would like to be tested for COVID-19 today  No known allergies  He has been taking Nyquil and Motrin as needed for the symptoms  Review of Systems   Review of Systems   Constitutional:        As noted in HPI   HENT:        As noted in HPI   Eyes: Negative  Respiratory:        As noted in HPI   Cardiovascular: Negative  Gastrointestinal: Negative  Musculoskeletal: Negative  Skin: Negative  Allergic/Immunologic: Negative  Neurological: Negative  Hematological: Negative            Current Medications       Current Outpatient Medications:     Ajovy 225 MG/1 5ML auto-injector, INJECT (225MG) BY SUBCUTANEOUS ROUTE EVERY MONTH IN THE ABDOMEN, THIGH, OR UPPER ARM, Disp: , Rfl:     albuterol (PROVENTIL HFA,VENTOLIN HFA) 90 mcg/act inhaler, INL 2 PFS PO Q 4 H PRF WHZ, Disp: , Rfl: 3    ALPRAZolam (XANAX) 1 mg tablet, TAKE 1 TABLET BY MOUTH THREE TIMES A DAY AS NEEDED, Disp: , Rfl:     escitalopram (LEXAPRO) 10 mg tablet, TAKE 1 TABLET DAILY, Disp: , Rfl:     mirtazapine (REMERON) 15 mg tablet, TAKE 1 TABLET ONCE DAILY INTHE EVENING BEFORE BEDTIME, Disp: , Rfl:     Trokendi  MG CP24, TAKE 1 CAPSULE BY ORAL ROUTE EVERY DAY AT NIGHT, Disp: , Rfl:     amoxicillin-clavulanate (AUGMENTIN) 875-125 mg per tablet, Take 1 tablet by mouth every 12 (twelve) hours for 7 days, Disp: 14 tablet, Rfl: 0    Anoro Ellipta 62 5-25 MCG/INH inhaler, INHALE 1 PUFF BY INHALATION ROUTE EVERY DAY AT THE SAME TIME EACH DAY, Disp: , Rfl:     benzonatate (TESSALON) 200 MG capsule, Take 1 capsule (200 mg total) by mouth 3 (three) times a day as needed for cough, Disp: 20 capsule, Rfl: 0    buprenorphine-naloxone (SUBOXONE) 8-2 mg, DIS 1 FILM UNDER THE TONGUE TID (Patient not taking: Reported on 6/22/2021), Disp: , Rfl:     fluticasone (FLONASE) 50 mcg/act nasal spray, 1 spray into each nostril daily, Disp: 9 9 mL, Rfl: 0    methylPREDNISolone 4 MG tablet therapy pack, Use as directed on package, Disp: 21 tablet, Rfl: 0    Current Allergies     Allergies as of 08/18/2021    (No Known Allergies)            The following portions of the patient's history were reviewed and updated as appropriate: allergies, current medications, past family history, past medical history, past social history, past surgical history and problem list      Past Medical History:   Diagnosis Date    Allergic rhinitis due to allergen 10/1/2018    Anxiety     Chronic pain     Depression 10/17/2016       Past Surgical History:   Procedure Laterality Date    ELBOW ARTHROSCOPY      ELBOW SURGERY      x2    WRIST SURGERY         History reviewed  No pertinent family history  Medications have been verified  Objective   /74 (BP Location: Right arm, Patient Position: Sitting, Cuff Size: Standard)   Pulse 80   Temp 97 6 °F (36 4 °C) (Tympanic)   Resp 20   Ht 6' 1" (1 854 m)   SpO2 97%   BMI 22 69 kg/m²   No LMP for male patient  Physical Exam     Physical Exam  Vitals and nursing note reviewed  Constitutional:       General: He is awake  He is not in acute distress  Appearance: Normal appearance  He is well-developed, well-groomed and normal weight  He is not ill-appearing, toxic-appearing or diaphoretic  HENT:      Head: Normocephalic and atraumatic  Right Ear: Tympanic membrane, ear canal and external ear normal       Left Ear: Tympanic membrane, ear canal and external ear normal       Nose: Mucosal edema and congestion present  Mouth/Throat:      Lips: Pink  Mouth: Mucous membranes are moist       Pharynx: Uvula midline  Posterior oropharyngeal erythema present  No pharyngeal swelling, oropharyngeal exudate or uvula swelling  Tonsils: No tonsillar exudate or tonsillar abscesses     Eyes:      General: Lids are normal       Conjunctiva/sclera: Conjunctivae normal    Cardiovascular:      Rate and Rhythm: Normal rate and regular rhythm  Pulses: Normal pulses  Heart sounds: Normal heart sounds  Pulmonary:      Effort: Pulmonary effort is normal  No tachypnea, accessory muscle usage or respiratory distress  Breath sounds: Normal air entry  Comments: Scattered coarse breath sounds and wheezing in the bilateral upper and lower lobes  Musculoskeletal:      Cervical back: Normal range of motion and neck supple  No rigidity or tenderness  Lymphadenopathy:      Cervical: No cervical adenopathy  Skin:     General: Skin is warm and dry  Coloration: Skin is not pale  Neurological:      Mental Status: He is alert and oriented to person, place, and time  Mental status is at baseline  Psychiatric:         Attention and Perception: Attention and perception normal          Mood and Affect: Mood and affect normal          Speech: Speech normal          Behavior: Behavior normal  Behavior is cooperative  Thought Content:  Thought content normal          Cognition and Memory: Cognition and memory normal          Judgment: Judgment normal

## 2021-08-18 NOTE — PATIENT INSTRUCTIONS
1  Acute Bronchitis  - CXR shows no active disease  - COVID-19 test performed in office today, patient was informed that he must remain at home on self isolation until test results return  - Augmentin x 7 days prescribed, to be completed as directed   - Medrol dose pack prescribed, to be completed as directed   - continue Albuterol inhaler as needed for wheezing  - Tessalon pearls prescribed to be taken as needed for cough   - advised to use Flonase nasal spray for sinus symptoms   - take Tylenol or Motrin as needed   - drink plenty of fluids and run a humidifier at home   - try warm salt water gargles and throat lozenges as needed   - follow up w/ PCP for re-check in 3-5 days   - if symptoms persist despite treatment, worsen, or any new symptoms present, patient is to be seen in the ER

## 2021-08-18 NOTE — LETTER
August 18, 2021     Patient: Judith Sim   YOB: 1975   Date of Visit: 8/18/2021       To Whom it May Concern:    Judith Sim was seen in my clinic on 8/18/2021  Please excuse from work of 08/18 and 08/19  If you have any questions or concerns, please don't hesitate to call           Sincerely,          Annabel Smith MD

## 2021-08-19 LAB — SARS-COV-2 RNA RESP QL NAA+PROBE: NEGATIVE

## 2021-08-20 ENCOUNTER — TELEPHONE (OUTPATIENT)
Dept: URGENT CARE | Facility: CLINIC | Age: 46
End: 2021-08-20

## 2023-07-28 ENCOUNTER — APPOINTMENT (EMERGENCY)
Dept: RADIOLOGY | Facility: HOSPITAL | Age: 48
DRG: 446 | End: 2023-07-28
Payer: COMMERCIAL

## 2023-07-28 ENCOUNTER — HOSPITAL ENCOUNTER (INPATIENT)
Facility: HOSPITAL | Age: 48
LOS: 4 days | Discharge: HOME | DRG: 446 | End: 2023-08-01
Attending: EMERGENCY MEDICINE | Admitting: SPECIALIST
Payer: COMMERCIAL

## 2023-07-28 ENCOUNTER — APPOINTMENT (OUTPATIENT)
Dept: NON INVASIVE DIAGNOSTICS | Facility: HOSPITAL | Age: 48
DRG: 446 | End: 2023-07-28
Attending: RADIOLOGY
Payer: COMMERCIAL

## 2023-07-28 DIAGNOSIS — E87.6 HYPOKALEMIA: ICD-10-CM

## 2023-07-28 DIAGNOSIS — K81.0 ACUTE CHOLECYSTITIS: Primary | ICD-10-CM

## 2023-07-28 DIAGNOSIS — T78.40XA ALLERGIC REACTION, INITIAL ENCOUNTER: ICD-10-CM

## 2023-07-28 DIAGNOSIS — K81.9 CHOLECYSTITIS: ICD-10-CM

## 2023-07-28 DIAGNOSIS — E83.42 HYPOMAGNESEMIA: ICD-10-CM

## 2023-07-28 LAB
ALBUMIN SERPL BCP-MCNC: 3.6 G/DL (ref 3.5–5)
ALP SERPL-CCNC: 93 U/L (ref 34–104)
ALT SERPL W P-5'-P-CCNC: 6 U/L (ref 7–52)
ANION GAP SERPL CALCULATED.3IONS-SCNC: 12 MMOL/L
APTT PPP: 31 SECONDS (ref 23–37)
AST SERPL W P-5'-P-CCNC: 12 U/L (ref 13–39)
BASOPHILS # BLD AUTO: 0.08 THOUSANDS/ÂΜL (ref 0–0.1)
BASOPHILS NFR BLD AUTO: 1 % (ref 0–1)
BILIRUB SERPL-MCNC: 0.48 MG/DL (ref 0.2–1)
BUN SERPL-MCNC: 3 MG/DL (ref 5–25)
CALCIUM SERPL-MCNC: 9.3 MG/DL (ref 8.4–10.2)
CHLORIDE SERPL-SCNC: 89 MMOL/L (ref 96–108)
CO2 SERPL-SCNC: 39 MMOL/L (ref 21–32)
CREAT SERPL-MCNC: 1 MG/DL (ref 0.6–1.3)
EOSINOPHIL # BLD AUTO: 0.11 THOUSAND/ÂΜL (ref 0–0.61)
EOSINOPHIL NFR BLD AUTO: 1 % (ref 0–6)
ERYTHROCYTE [DISTWIDTH] IN BLOOD BY AUTOMATED COUNT: 14 % (ref 11.6–15.1)
GFR SERPL CREATININE-BSD FRML MDRD: 90 ML/MIN/1.73SQ M
GLUCOSE SERPL-MCNC: 125 MG/DL (ref 65–140)
HCT VFR BLD AUTO: 41.1 % (ref 36.5–49.3)
HGB BLD-MCNC: 14.1 G/DL (ref 12–17)
IMM GRANULOCYTES # BLD AUTO: 0.08 THOUSAND/UL (ref 0–0.2)
IMM GRANULOCYTES NFR BLD AUTO: 1 % (ref 0–2)
INR PPP: 1 (ref 0.84–1.19)
LIPASE SERPL-CCNC: 23 U/L (ref 11–82)
LYMPHOCYTES # BLD AUTO: 2.86 THOUSANDS/ÂΜL (ref 0.6–4.47)
LYMPHOCYTES NFR BLD AUTO: 25 % (ref 14–44)
MAGNESIUM SERPL-MCNC: 1.7 MG/DL (ref 1.9–2.7)
MCH RBC QN AUTO: 28.1 PG (ref 26.8–34.3)
MCHC RBC AUTO-ENTMCNC: 34.3 G/DL (ref 31.4–37.4)
MCV RBC AUTO: 82 FL (ref 82–98)
MONOCYTES # BLD AUTO: 0.67 THOUSAND/ÂΜL (ref 0.17–1.22)
MONOCYTES NFR BLD AUTO: 6 % (ref 4–12)
NEUTROPHILS # BLD AUTO: 7.85 THOUSANDS/ÂΜL (ref 1.85–7.62)
NEUTS SEG NFR BLD AUTO: 66 % (ref 43–75)
NRBC BLD AUTO-RTO: 0 /100 WBCS
PLATELET # BLD AUTO: 328 THOUSANDS/UL (ref 149–390)
PMV BLD AUTO: 9.6 FL (ref 8.9–12.7)
POTASSIUM SERPL-SCNC: 2.3 MMOL/L (ref 3.5–5.3)
PROT SERPL-MCNC: 6.8 G/DL (ref 6.4–8.4)
PROTHROMBIN TIME: 13.3 SECONDS (ref 11.6–14.5)
RBC # BLD AUTO: 5.01 MILLION/UL (ref 3.88–5.62)
SODIUM SERPL-SCNC: 140 MMOL/L (ref 135–147)
WBC # BLD AUTO: 11.65 THOUSAND/UL (ref 4.31–10.16)

## 2023-07-28 PROCEDURE — 87070 CULTURE OTHR SPECIMN AEROBIC: CPT | Performed by: SPECIALIST

## 2023-07-28 PROCEDURE — 87077 CULTURE AEROBIC IDENTIFY: CPT | Performed by: SPECIALIST

## 2023-07-28 PROCEDURE — 87075 CULTR BACTERIA EXCEPT BLOOD: CPT | Performed by: SPECIALIST

## 2023-07-28 PROCEDURE — NC001 PR NO CHARGE: Performed by: RADIOLOGY

## 2023-07-28 PROCEDURE — 0F9430Z DRAINAGE OF GALLBLADDER WITH DRAINAGE DEVICE, PERCUTANEOUS APPROACH: ICD-10-PCS | Performed by: RADIOLOGY

## 2023-07-28 PROCEDURE — 99152 MOD SED SAME PHYS/QHP 5/>YRS: CPT

## 2023-07-28 PROCEDURE — 85025 COMPLETE CBC W/AUTO DIFF WBC: CPT | Performed by: PHYSICIAN ASSISTANT

## 2023-07-28 PROCEDURE — 96376 TX/PRO/DX INJ SAME DRUG ADON: CPT

## 2023-07-28 PROCEDURE — 99223 1ST HOSP IP/OBS HIGH 75: CPT | Performed by: SPECIALIST

## 2023-07-28 PROCEDURE — 99285 EMERGENCY DEPT VISIT HI MDM: CPT | Performed by: PHYSICIAN ASSISTANT

## 2023-07-28 PROCEDURE — 47490 INCISION OF GALLBLADDER: CPT

## 2023-07-28 PROCEDURE — 85610 PROTHROMBIN TIME: CPT | Performed by: EMERGENCY MEDICINE

## 2023-07-28 PROCEDURE — 87205 SMEAR GRAM STAIN: CPT | Performed by: SPECIALIST

## 2023-07-28 PROCEDURE — 96366 THER/PROPH/DIAG IV INF ADDON: CPT

## 2023-07-28 PROCEDURE — 99284 EMERGENCY DEPT VISIT MOD MDM: CPT

## 2023-07-28 PROCEDURE — 83735 ASSAY OF MAGNESIUM: CPT | Performed by: PHYSICIAN ASSISTANT

## 2023-07-28 PROCEDURE — C1769 GUIDE WIRE: HCPCS

## 2023-07-28 PROCEDURE — 47490 INCISION OF GALLBLADDER: CPT | Performed by: RADIOLOGY

## 2023-07-28 PROCEDURE — 36415 COLL VENOUS BLD VENIPUNCTURE: CPT | Performed by: PHYSICIAN ASSISTANT

## 2023-07-28 PROCEDURE — C1729 CATH, DRAINAGE: HCPCS

## 2023-07-28 PROCEDURE — 99152 MOD SED SAME PHYS/QHP 5/>YRS: CPT | Performed by: RADIOLOGY

## 2023-07-28 PROCEDURE — 96368 THER/DIAG CONCURRENT INF: CPT

## 2023-07-28 PROCEDURE — 80053 COMPREHEN METABOLIC PANEL: CPT | Performed by: PHYSICIAN ASSISTANT

## 2023-07-28 PROCEDURE — 76705 ECHO EXAM OF ABDOMEN: CPT

## 2023-07-28 PROCEDURE — 96367 TX/PROPH/DG ADDL SEQ IV INF: CPT

## 2023-07-28 PROCEDURE — 85730 THROMBOPLASTIN TIME PARTIAL: CPT | Performed by: EMERGENCY MEDICINE

## 2023-07-28 PROCEDURE — 96365 THER/PROPH/DIAG IV INF INIT: CPT

## 2023-07-28 PROCEDURE — 96375 TX/PRO/DX INJ NEW DRUG ADDON: CPT

## 2023-07-28 PROCEDURE — 83690 ASSAY OF LIPASE: CPT | Performed by: PHYSICIAN ASSISTANT

## 2023-07-28 PROCEDURE — 74177 CT ABD & PELVIS W/CONTRAST: CPT

## 2023-07-28 RX ORDER — METOPROLOL TARTRATE 5 MG/5ML
2.5 INJECTION INTRAVENOUS EVERY 6 HOURS PRN
Status: DISCONTINUED | OUTPATIENT
Start: 2023-07-28 | End: 2023-08-01 | Stop reason: HOSPADM

## 2023-07-28 RX ORDER — POTASSIUM CHLORIDE 29.8 MG/ML
40 INJECTION INTRAVENOUS ONCE
Status: DISCONTINUED | OUTPATIENT
Start: 2023-07-28 | End: 2023-07-28

## 2023-07-28 RX ORDER — POTASSIUM CHLORIDE 14.9 MG/ML
20 INJECTION INTRAVENOUS ONCE
Status: COMPLETED | OUTPATIENT
Start: 2023-07-28 | End: 2023-07-28

## 2023-07-28 RX ORDER — ENOXAPARIN SODIUM 100 MG/ML
40 INJECTION SUBCUTANEOUS DAILY
Status: DISCONTINUED | OUTPATIENT
Start: 2023-07-29 | End: 2023-08-01 | Stop reason: HOSPADM

## 2023-07-28 RX ORDER — HYDROMORPHONE HCL/PF 1 MG/ML
1 SYRINGE (ML) INJECTION ONCE
Status: COMPLETED | OUTPATIENT
Start: 2023-07-28 | End: 2023-07-28

## 2023-07-28 RX ORDER — ALPRAZOLAM 0.5 MG/1
1 TABLET ORAL 3 TIMES DAILY PRN
Status: DISCONTINUED | OUTPATIENT
Start: 2023-07-28 | End: 2023-08-01 | Stop reason: HOSPADM

## 2023-07-28 RX ORDER — DIPHENHYDRAMINE HYDROCHLORIDE 50 MG/ML
50 INJECTION INTRAMUSCULAR; INTRAVENOUS ONCE
Status: COMPLETED | OUTPATIENT
Start: 2023-07-28 | End: 2023-07-28

## 2023-07-28 RX ORDER — FAMOTIDINE 10 MG/ML
40 INJECTION, SOLUTION INTRAVENOUS ONCE
Status: COMPLETED | OUTPATIENT
Start: 2023-07-28 | End: 2023-07-28

## 2023-07-28 RX ORDER — METHYLPREDNISOLONE SODIUM SUCCINATE 125 MG/2ML
125 INJECTION, POWDER, LYOPHILIZED, FOR SOLUTION INTRAMUSCULAR; INTRAVENOUS ONCE
Status: COMPLETED | OUTPATIENT
Start: 2023-07-28 | End: 2023-07-28

## 2023-07-28 RX ORDER — MIDAZOLAM HYDROCHLORIDE 2 MG/2ML
INJECTION, SOLUTION INTRAMUSCULAR; INTRAVENOUS AS NEEDED
Status: COMPLETED | OUTPATIENT
Start: 2023-07-28 | End: 2023-07-28

## 2023-07-28 RX ORDER — SIMETHICONE 80 MG
80 TABLET,CHEWABLE ORAL EVERY 6 HOURS PRN
Status: DISCONTINUED | OUTPATIENT
Start: 2023-07-28 | End: 2023-08-01 | Stop reason: HOSPADM

## 2023-07-28 RX ORDER — ALBUTEROL SULFATE 90 UG/1
1 AEROSOL, METERED RESPIRATORY (INHALATION) EVERY 6 HOURS PRN
Status: DISCONTINUED | OUTPATIENT
Start: 2023-07-28 | End: 2023-08-01 | Stop reason: HOSPADM

## 2023-07-28 RX ORDER — POTASSIUM CHLORIDE 14.9 MG/ML
20 INJECTION INTRAVENOUS ONCE
Status: COMPLETED | OUTPATIENT
Start: 2023-07-28 | End: 2023-07-29

## 2023-07-28 RX ORDER — ONDANSETRON 2 MG/ML
4 INJECTION INTRAMUSCULAR; INTRAVENOUS EVERY 6 HOURS PRN
Status: DISCONTINUED | OUTPATIENT
Start: 2023-07-28 | End: 2023-08-01 | Stop reason: HOSPADM

## 2023-07-28 RX ORDER — MAGNESIUM SULFATE HEPTAHYDRATE 40 MG/ML
2 INJECTION, SOLUTION INTRAVENOUS ONCE
Status: COMPLETED | OUTPATIENT
Start: 2023-07-28 | End: 2023-07-28

## 2023-07-28 RX ORDER — XYLITOL/YERBA SANTA
5 AEROSOL, SPRAY WITH PUMP (ML) MUCOUS MEMBRANE 4 TIMES DAILY PRN
Status: DISCONTINUED | OUTPATIENT
Start: 2023-07-28 | End: 2023-08-01 | Stop reason: HOSPADM

## 2023-07-28 RX ORDER — ACETAMINOPHEN 325 MG/1
650 TABLET ORAL EVERY 6 HOURS PRN
Status: DISCONTINUED | OUTPATIENT
Start: 2023-07-28 | End: 2023-08-01 | Stop reason: HOSPADM

## 2023-07-28 RX ORDER — CALCIUM CARBONATE 500 MG/1
500 TABLET, CHEWABLE ORAL DAILY PRN
Status: DISCONTINUED | OUTPATIENT
Start: 2023-07-28 | End: 2023-08-01 | Stop reason: HOSPADM

## 2023-07-28 RX ORDER — SODIUM CHLORIDE, SODIUM LACTATE, POTASSIUM CHLORIDE, CALCIUM CHLORIDE 600; 310; 30; 20 MG/100ML; MG/100ML; MG/100ML; MG/100ML
75 INJECTION, SOLUTION INTRAVENOUS CONTINUOUS
Status: DISCONTINUED | OUTPATIENT
Start: 2023-07-28 | End: 2023-07-30

## 2023-07-28 RX ORDER — ONDANSETRON 2 MG/ML
4 INJECTION INTRAMUSCULAR; INTRAVENOUS ONCE
Status: COMPLETED | OUTPATIENT
Start: 2023-07-28 | End: 2023-07-28

## 2023-07-28 RX ORDER — HYDROMORPHONE HCL IN WATER/PF 6 MG/30 ML
0.2 PATIENT CONTROLLED ANALGESIA SYRINGE INTRAVENOUS EVERY 4 HOURS PRN
Status: DISCONTINUED | OUTPATIENT
Start: 2023-07-28 | End: 2023-07-29

## 2023-07-28 RX ORDER — ALBUTEROL SULFATE 2.5 MG/3ML
2.5 SOLUTION RESPIRATORY (INHALATION) ONCE
Status: COMPLETED | OUTPATIENT
Start: 2023-07-28 | End: 2023-07-28

## 2023-07-28 RX ORDER — SODIUM CHLORIDE 9 MG/ML
10 INJECTION INTRAVENOUS DAILY
Qty: 900 ML | Refills: 0 | Status: SHIPPED | OUTPATIENT
Start: 2023-07-28 | End: 2023-08-11

## 2023-07-28 RX ORDER — METRONIDAZOLE 500 MG/100ML
500 INJECTION, SOLUTION INTRAVENOUS EVERY 8 HOURS
Status: DISCONTINUED | OUTPATIENT
Start: 2023-07-29 | End: 2023-08-01

## 2023-07-28 RX ORDER — FENTANYL CITRATE 50 UG/ML
INJECTION, SOLUTION INTRAMUSCULAR; INTRAVENOUS AS NEEDED
Status: COMPLETED | OUTPATIENT
Start: 2023-07-28 | End: 2023-07-28

## 2023-07-28 RX ORDER — LIDOCAINE WITH 8.4% SOD BICARB 0.9%(10ML)
SYRINGE (ML) INJECTION AS NEEDED
Status: COMPLETED | OUTPATIENT
Start: 2023-07-28 | End: 2023-07-28

## 2023-07-28 RX ORDER — POTASSIUM CHLORIDE 20MEQ/15ML
40 LIQUID (ML) ORAL ONCE
Status: COMPLETED | OUTPATIENT
Start: 2023-07-28 | End: 2023-07-28

## 2023-07-28 RX ORDER — LEVOFLOXACIN 5 MG/ML
750 INJECTION, SOLUTION INTRAVENOUS EVERY 24 HOURS
Status: DISCONTINUED | OUTPATIENT
Start: 2023-07-28 | End: 2023-08-01

## 2023-07-28 RX ORDER — HYDROMORPHONE HCL/PF 1 MG/ML
0.5 SYRINGE (ML) INJECTION
Status: DISCONTINUED | OUTPATIENT
Start: 2023-07-28 | End: 2023-07-30

## 2023-07-28 RX ORDER — HYDROMORPHONE HCL/PF 1 MG/ML
0.5 SYRINGE (ML) INJECTION ONCE
Status: COMPLETED | OUTPATIENT
Start: 2023-07-28 | End: 2023-07-28

## 2023-07-28 RX ADMIN — IOHEXOL 100 ML: 350 INJECTION, SOLUTION INTRAVENOUS at 11:48

## 2023-07-28 RX ADMIN — POTASSIUM CHLORIDE 20 MEQ: 14.9 INJECTION, SOLUTION INTRAVENOUS at 15:05

## 2023-07-28 RX ADMIN — HYDROMORPHONE HYDROCHLORIDE 0.2 MG: 0.2 INJECTION, SOLUTION INTRAMUSCULAR; INTRAVENOUS; SUBCUTANEOUS at 18:41

## 2023-07-28 RX ADMIN — SODIUM CHLORIDE, SODIUM LACTATE, POTASSIUM CHLORIDE, AND CALCIUM CHLORIDE 1000 ML: .6; .31; .03; .02 INJECTION, SOLUTION INTRAVENOUS at 10:49

## 2023-07-28 RX ADMIN — DIPHENHYDRAMINE HYDROCHLORIDE 50 MG: 50 INJECTION, SOLUTION INTRAMUSCULAR; INTRAVENOUS at 13:50

## 2023-07-28 RX ADMIN — METHYLPREDNISOLONE SODIUM SUCCINATE 125 MG: 125 INJECTION, POWDER, FOR SOLUTION INTRAMUSCULAR; INTRAVENOUS at 13:50

## 2023-07-28 RX ADMIN — FENTANYL CITRATE 25 MCG: 50 INJECTION, SOLUTION INTRAMUSCULAR; INTRAVENOUS at 16:54

## 2023-07-28 RX ADMIN — MIDAZOLAM 0.5 MG: 1 INJECTION INTRAMUSCULAR; INTRAVENOUS at 16:54

## 2023-07-28 RX ADMIN — PIPERACILLIN SODIUM AND TAZOBACTAM SODIUM 4.5 G: 4; .5 INJECTION, POWDER, LYOPHILIZED, FOR SOLUTION INTRAVENOUS at 13:06

## 2023-07-28 RX ADMIN — SODIUM CHLORIDE, SODIUM LACTATE, POTASSIUM CHLORIDE, AND CALCIUM CHLORIDE 125 ML/HR: .6; .31; .03; .02 INJECTION, SOLUTION INTRAVENOUS at 14:33

## 2023-07-28 RX ADMIN — LEVOFLOXACIN 750 MG: 750 INJECTION, SOLUTION INTRAVENOUS at 18:40

## 2023-07-28 RX ADMIN — HYDROMORPHONE HYDROCHLORIDE 0.5 MG: 1 INJECTION, SOLUTION INTRAMUSCULAR; INTRAVENOUS; SUBCUTANEOUS at 10:44

## 2023-07-28 RX ADMIN — HYDROMORPHONE HYDROCHLORIDE 0.5 MG: 1 INJECTION, SOLUTION INTRAMUSCULAR; INTRAVENOUS; SUBCUTANEOUS at 23:18

## 2023-07-28 RX ADMIN — ONDANSETRON 4 MG: 2 INJECTION INTRAMUSCULAR; INTRAVENOUS at 10:42

## 2023-07-28 RX ADMIN — METRONIDAZOLE 500 MG: 500 INJECTION, SOLUTION INTRAVENOUS at 23:19

## 2023-07-28 RX ADMIN — FAMOTIDINE 40 MG: 10 INJECTION, SOLUTION INTRAVENOUS at 13:52

## 2023-07-28 RX ADMIN — MAGNESIUM SULFATE HEPTAHYDRATE 2 G: 40 INJECTION, SOLUTION INTRAVENOUS at 11:58

## 2023-07-28 RX ADMIN — POTASSIUM CHLORIDE 40 MEQ: 1.5 SOLUTION ORAL at 11:57

## 2023-07-28 RX ADMIN — ALBUTEROL SULFATE 2.5 MG: 2.5 SOLUTION RESPIRATORY (INHALATION) at 13:52

## 2023-07-28 RX ADMIN — HYDROMORPHONE HYDROCHLORIDE 1 MG: 1 INJECTION, SOLUTION INTRAMUSCULAR; INTRAVENOUS; SUBCUTANEOUS at 13:06

## 2023-07-28 RX ADMIN — Medication 10 ML: at 17:04

## 2023-07-28 NOTE — ED NOTES
This RN reminded pt again to keep R arm straight to avoid occlusion     Edy Bearden RN  07/28/23 [id number]

## 2023-07-28 NOTE — PLAN OF CARE
Problem: PAIN - ADULT  Goal: Verbalizes/displays adequate comfort level or baseline comfort level  Description: Interventions:  - Encourage patient to monitor pain and request assistance  - Assess pain using appropriate pain scale  - Administer analgesics based on type and severity of pain and evaluate response  - Implement non-pharmacological measures as appropriate and evaluate response  - Consider cultural and social influences on pain and pain management  - Notify physician/advanced practitioner if interventions unsuccessful or patient reports new pain  Outcome: Progressing     Problem: INFECTION - ADULT  Goal: Absence or prevention of progression during hospitalization  Description: INTERVENTIONS:  - Assess and monitor for signs and symptoms of infection  - Monitor lab/diagnostic results  - Monitor all insertion sites, i.e. indwelling lines, tubes, and drains  - Monitor endotracheal if appropriate and nasal secretions for changes in amount and color  - [place] appropriate cooling/warming therapies per order  - Administer medications as ordered  - Instruct and encourage patient and family to use good hand hygiene technique  - Identify and instruct in appropriate isolation precautions for identified infection/condition  Outcome: Progressing  Goal: Absence of fever/infection during neutropenic period  Description: INTERVENTIONS:  - Monitor WBC    Outcome: Progressing     Problem: GASTROINTESTINAL - ADULT  Goal: Minimal or absence of nausea and/or vomiting  Description: INTERVENTIONS:  - Administer IV fluids if ordered to ensure adequate hydration  - Maintain NPO status until nausea and vomiting are resolved  - Nasogastric tube if ordered  - Administer ordered antiemetic medications as needed  - Provide nonpharmacologic comfort measures as appropriate  - Advance diet as tolerated, if ordered  - Consider nutrition services referral to assist patient with adequate nutrition and appropriate food choices  Outcome: Progressing  Goal: Maintains or returns to baseline bowel function  Description: INTERVENTIONS:  - Assess bowel function  - Encourage oral fluids to ensure adequate hydration  - Administer IV fluids if ordered to ensure adequate hydration  - Administer ordered medications as needed  - Encourage mobilization and activity  - Consider nutritional services referral to assist patient with adequate nutrition and appropriate food choices  Outcome: Progressing  Goal: Maintains adequate nutritional intake  Description: INTERVENTIONS:  - Monitor percentage of each meal consumed  - Identify factors contributing to decreased intake, treat as appropriate  - Assist with meals as needed  - Monitor I&O, weight, and lab values if indicated  - Obtain nutrition services referral as needed  Outcome: Progressing

## 2023-07-28 NOTE — CONSULTS
e-Consult (IPC)  - Interventional Radiology  Thornell Paget 41 y.o. male MRN: [id number]  Unit/Bed#: ED 06 Encounter: [id number]    Interventional Radiology has been consulted to evaluate Thornell Paget    We were consulted by Ruperto Tatum PA-C concerning this patient with abdominal pain. .    Inpatient Consult to IR  Consult performed by: Elena Sheridan MD  Consult ordered by: Ruperto Tatum PA-C        07/28/23    Assessment/Recommendation:   Patient with abdominal pain for 10 days now with acute cholecystitis. IR to place a percutaneous cholecystostomy tube today. 21-30 minutes, >50% of the total time devoted to medical consultative verbal/EMR discussion between providers. Written report will be generated in the EMR. Thank you for allowing Interventional Radiology to participate in the care of Thornell Paget. Please don't hesitate to call or TigerText us with any questions.      Elena Sheridan MD

## 2023-07-28 NOTE — ED NOTES
Pt on Zosyn, this nurse educated pt to keep R arm straight to avoid occlusion of meds     Frankie Spatz, RN  07/28/23 0913

## 2023-07-28 NOTE — DISCHARGE INSTRUCTIONS
TUBE CARE INSTRUCTIONS    Care after your procedure:    Resume your normal diet. Small sips of flat soda will help with nausea. 1. The properly functioning catheter should be forward flushed once (1x) daily with 10ml of normal saline using clean technique. You will be given a prescription for flushes. To flush the tube, clean both connections with alcohol swab. Twist off the drainage bag/ bulb  tubing and twist the saline syringe into the drainage tube and flush. Remove the syringe and twist the drainage bag / bulb tubing tubing back on.    2. The drainage bag/bulb may be emptied as necessary. Keep a record of the amount of fluid you drain from your tube. This should be done with clean technique as well. 3. A fresh dressing should be applied daily over the tube insertion site. 4. As the tube is secured to the skin with only a suture,try not to pull on your tube. Tub baths are not permitted. Showers are permitted if the patient's skin entry site is prevented from getting wet. Similarly, washcloth "baths" are acceptable. Contact Interventional Radiology at [phone] if:    1. Leakage or large amounts of liquid around the catheter. 2. Fever of 101 degrees lasting several hours without other obvious cause (such as sore throat, flu, etc). 3. Persistent nausea or vomiting. 4. Diminished drainage, which may be associated with pressure or pain. Or when the drainage from your tube is less than 10mls for 48 hours. 5. Catheter pulled back or falls out. The following pharmacies carry the flush syringes.        [facility] AND [facility] [place], operated by [facility]                         [phone]  [facility]  Phone [phone]            Phone [phone] [address]                                 [phone]  [address] [place]  Phone [phone]            Phone [phone]                      Jose Jenkins                                                                                                          [phone]  [facility]  [address] [place] [address].   [address] [facility] [address]                                                                               [address] [facility]  Phone [phone]

## 2023-07-28 NOTE — ED NOTES
This RN made Julisa Head aware pt is having allergen reaction while on Zosyn IV. This RN stopped  Zosyn IV.      Melissa Caraballo RN  07/28/23 3193

## 2023-07-28 NOTE — H&P
H&P Exam - General Surgery   Maddie Garrison 41 y.o. male MRN: [id number]  Unit/Bed#: ED 06 Encounter: [id number]    Assessment/Plan     Assessment:  · Acute cholecystitis with symptoms for almost two weeks -- CT scan revealed GB with thickened wall and some surrounding fluid, possible septations vs single small stone at the infundibulum? and dilated CBD. Afebrile, vitals stable. WBC Count 11.6, LFTs within normal limits  · Zosyn allergy -- in ED patient had full body flushing and SOB after zosyn administration        Plan:  RUQ ultrasound pending  Potassium and magnesium repletion   NPO, sips with meds  IV fluid hydration  IR consult for percutaneous cholecystostomy tube   Solu-medrol, Benadryl, Pepcid given for allergic reaction to zosyn  Will avoid ancef for now due to new found allergy, start Levaquin/flagyl   Med rec in chart is incorrect and will need updated  Nicotine patch           History of Present Illness     HPI:  Maddie Garrison is a 41 y.o. male whom is a current every day smoker with history of long COVID, anxiety, and depression who presents with almost two weeks of nausea, vomiting, and RUQ pain. Patient had some episodes of loose stool but no BM since two days ago. Pain comes in waves and has always been located in the RUQ and associated with some abdominal bloating. Pain is worse with bending over or sitting up from a lying position. Patient last ate any food 6 days ago. Has only been drinking some water. Last episode of emesis was two days ago. Denies any fevers or chills. Over the past year has decreased from 3 packs of cigarettes a day to less than 1. Recently tried marijuana but only once. Patient states he takes metoprolol daily, some sort of anti-depressant medication daily, and Xanax as needed. Review of Systems   Constitutional: Positive for appetite change. Negative for chills, fatigue and fever.    HENT: Negative for congestion, rhinorrhea, sneezing, sore throat and trouble swallowing. Eyes: Negative. Respiratory: Negative for cough, chest tightness, shortness of breath and wheezing. Cardiovascular: Negative for chest pain, palpitations and leg swelling. Gastrointestinal: Positive for abdominal distention, abdominal pain, diarrhea, nausea and vomiting. Negative for blood in stool. Genitourinary: Negative for difficulty urinating, dysuria, flank pain and hematuria. Musculoskeletal: Negative. Skin: Negative for rash and wound. Neurological: Negative for dizziness, syncope, weakness, light-headedness and headaches. Hematological: Negative. Psychiatric/Behavioral: Negative. Historical Information   Past Medical History:   Diagnosis Date   • Allergic rhinitis due to allergen 10/1/2018   • Anxiety    • Chronic pain    • Depression 10/17/2016     Past Surgical History:   Procedure Laterality Date   • ELBOW ARTHROSCOPY     • ELBOW SURGERY      x2   • WRIST SURGERY       Social History   Social History     Substance and Sexual Activity   Alcohol Use Not Currently     Social History     Substance and Sexual Activity   Drug Use No    Comment: Is in pain mangement,  to get off pain meds.      Social History     Tobacco Use   Smoking Status Every Day   • Packs/day: 1.00   • Years: 30.00   • Total pack years: 30.00   • Types: Cigarettes   Smokeless Tobacco Never     E-Cigarette/Vaping   • E-Cigarette Use Never User      E-Cigarette/Vaping Substances     Family History: non-contributory    Meds/Allergies   all medications and allergies reviewed  No Known Allergies    Objective   First Vitals:   Blood Pressure: 112/82 (07/28/23 0944)  Pulse: 105 (07/28/23 0944)  Temperature: (!) 97.4 °F (36.3 °C) (07/28/23 0944)  Temp Source: Oral (07/28/23 0944)  Respirations: 18 (07/28/23 0944)  SpO2: 94 % (07/28/23 0944)    Current Vitals:   Blood Pressure: 103/64 (07/28/23 1300)  Pulse: 86 (07/28/23 1300)  Temperature: (!) 97.4 °F (36.3 °C) (07/28/23 0944)  Temp Source: Oral (07/28/23 5252)  Respirations: 18 (07/28/23 1200)  SpO2: (!) 89 % (07/28/23 1300)    No intake or output data in the 24 hours ending 07/28/23 1326    Invasive Devices     Peripheral Intravenous Line  Duration           Peripheral IV 07/28/23 Right Antecubital <1 day                Physical Exam  Vitals reviewed. Constitutional:       General: He is awake. Appearance: Normal appearance. He is well-developed and normal weight. He is ill-appearing. He is not toxic-appearing or diaphoretic. Interventions: He is not intubated. HENT:      Head: Normocephalic and atraumatic. Not macrocephalic and not microcephalic. No raccoon eyes, Locke's sign, right periorbital erythema or left periorbital erythema. Right Ear: Hearing and external ear normal.      Left Ear: Hearing and external ear normal.      Nose: Nose normal.   Eyes:      General: No scleral icterus. Right eye: No discharge. Left eye: No discharge. Conjunctiva/sclera: Conjunctivae normal.      Right eye: Right conjunctiva is not injected. No hemorrhage. Left eye: Left conjunctiva is not injected. No hemorrhage. Pupils: Pupils are equal, round, and reactive to light. Cardiovascular:      Rate and Rhythm: Normal rate and regular rhythm. Heart sounds: Normal heart sounds. Pulmonary:      Effort: Pulmonary effort is normal. No tachypnea, bradypnea or respiratory distress. He is not intubated. Breath sounds: No stridor. Wheezing present. No decreased breath sounds or rhonchi. Comments: 86% on room air   Abdominal:      General: There is no distension. Palpations: Abdomen is soft. Abdomen is not rigid. Tenderness: There is abdominal tenderness in the right upper quadrant and epigastric area. There is no guarding or rebound. Hernia: No hernia is present. Musculoskeletal:      Right lower leg: No edema. Left lower leg: No edema. Skin:     General: Skin is warm and dry.       Coloration: Skin is not cyanotic, jaundiced or mottled. Findings: No signs of injury or rash. Comments: Full body flushing, dry itchy flaking skin   Neurological:      General: No focal deficit present. Mental Status: He is alert and oriented to person, place, and time. He is not disoriented. GCS: GCS eye subscore is 4. GCS verbal subscore is 5. GCS motor subscore is 6. Cranial Nerves: Cranial nerves 2-12 are intact. No cranial nerve deficit. Psychiatric:         Speech: Speech normal.         Behavior: Behavior normal. Behavior is cooperative. Lab Results:   I have personally reviewed pertinent lab results. , CBC:   Lab Results   Component Value Date    WBC 11.65 (H) 07/28/2023    HGB 14.1 07/28/2023    HCT 41.1 07/28/2023    MCV 82 07/28/2023     07/28/2023    RBC 5.01 07/28/2023    MCH 28.1 07/28/2023    MCHC 34.3 07/28/2023    RDW 14.0 07/28/2023    MPV 9.6 07/28/2023    NRBC 0 07/28/2023   , CMP:   Lab Results   Component Value Date    SODIUM 140 07/28/2023    K 2.3 (LL) 07/28/2023    CL 89 (L) 07/28/2023    CO2 39 (H) 07/28/2023    BUN 3 (L) 07/28/2023    CREATININE 1.00 07/28/2023    CALCIUM 9.3 07/28/2023    AST 12 (L) 07/28/2023    ALT 6 (L) 07/28/2023    ALKPHOS 93 07/28/2023    EGFR 90 07/28/2023   , Coagulation: No results found for: "PT", "INR", "APTT", Urinalysis: No results found for: "COLORU", "CLARITYU", "SPECGRAV", "PHUR", "LEUKOCYTESUR", "NITRITE", "PROTEINUA", "GLUCOSEU", "Orlin Americo", "Zamzam Rajesh", "BLOODU", Lipase:   Lab Results   Component Value Date    LIPASE 23 07/28/2023     Imaging: I have personally reviewed pertinent reports. and I have personally reviewed pertinent films in PACS  EKG, Pathology, and Other Studies: I have personally reviewed pertinent reports. Code Status: No Order  Advance Directive and Living Will:      Power of :    POLST:      Counseling / Coordination of Care  Total floor / unit time spent today 45 minutes.   Greater than 50% of total time was spent with the patient and / or family counseling and / or coordination of care. A description of the counseling / coordination of care: Obtaining patient history, performing physical exam, reviewing pertinent labs and imaging, discussed management with attending physician.

## 2023-07-28 NOTE — SEDATION DOCUMENTATION
Choly tube placed with sedation, vss, pt tolerated without incident. 10 fr choly tube to bag drainage. Pt back to ED in stable condition.

## 2023-07-28 NOTE — ED PROVIDER NOTES
History  Chief Complaint   Patient presents with   • Abdominal Pain     Pt reports R upper quadrant pain X  1 week, Hx gallstones     Patient is a 51-year-old male with medical history of gallstones and tobacco abuse presenting to the ED for evaluation of worsening right upper quadrant pain x2 weeks. Patient states that 2 weeks ago he started with intermittent stabbing pain in the right upper abdomen that worsened with eating. Pain continued to progress and worsen and began to wrap around his right rib cage to the right back. Patient also endorses vomiting, headache, sleep disturbance, and fatigue that began 1 week ago. He states he has been able to keep water down but has not eaten since last Saturday 7/22 due to significant pain and vomiting. Patient admits he was told he had gallstones and that he has had mild pain in the right upper abdomen before but nothing like this. Patient's sister is at bedside and states that multiple members in the family have had a history of gallstones and cholecystectomy. At presentation today, patient endorses 10/10 stabbing pain to the right upper quadrant wrapping around to the right flank, nausea, and headache. Patient has no known drug allergies. Denies radiation of pain to shoulder. Patient has a 35-year tobacco history and currently smokes half pack per day, denies illicit drug use or alcohol use. History provided by:  Patient and relative (Sister)   used: No        Prior to Admission Medications   Prescriptions Last Dose Informant Patient Reported? Taking?    ALPRAZolam (XANAX) 1 mg tablet Past Week  Yes Yes   Sig: TAKE 1 TABLET BY MOUTH THREE TIMES A DAY AS NEEDED   Ajovy 225 MG/1.5ML auto-injector Not Taking  Yes No   Sig: INJECT (225MG) BY SUBCUTANEOUS ROUTE EVERY MONTH IN THE ABDOMEN, THIGH, OR UPPER ARM   Patient not taking: Reported on 7/28/2023   Anoro Ellipta 62.5-25 MCG/INH inhaler Not Taking  Yes No   Sig: INHALE 1 PUFF BY INHALATION ROUTE EVERY DAY AT THE SAME TIME [facility] DAY   Patient not taking: Reported on 2023   Trokendi  MG CP24 Not Taking  Yes No   Sig: TAKE 1 CAPSULE BY ORAL ROUTE EVERY DAY AT NIGHT   Patient not taking: Reported on 2023   albuterol (PROVENTIL HFA,VENTOLIN HFA) 90 mcg/act inhaler Past Week  Yes Yes   Sig: INL 2 PFS PO Q 4 H PRF WHZ   benzonatate (TESSALON) 200 MG capsule Not Taking  No No   Sig: Take 1 capsule (200 mg total) by mouth 3 (three) times a day as needed for cough   Patient not taking: Reported on 2023   buprenorphine-naloxone (SUBOXONE) 8-2 mg   Yes No   Sig: DIS 1 FILM UNDER THE TONGUE TID   Patient not taking: Reported on 2021   escitalopram (LEXAPRO) 10 mg tablet Not Taking  Yes No   Sig: TAKE 1 TABLET DAILY   Patient not taking: Reported on 2023   fluticasone (FLONASE) 50 mcg/act nasal spray More than a month  No No   Si spray into each nostril daily   methylPREDNISolone 4 MG tablet therapy pack Not Taking  No No   Sig: Use as directed on package   Patient not taking: Reported on 2023   mirtazapine (REMERON) 15 mg tablet Not Taking  Yes No   Sig: TAKE 1 TABLET ONCE DAILY INTHE EVENING BEFORE BEDTIME   Patient not taking: Reported on 2023      Facility-Administered Medications: None       Past Medical History:   Diagnosis Date   • Allergic rhinitis due to allergen 10/1/2018   • Anxiety    • Chronic pain    • Depression 10/17/2016       Past Surgical History:   Procedure Laterality Date   • ELBOW ARTHROSCOPY     • ELBOW SURGERY      x2   • IR CHOLECYSTOSTOMY TUBE PLACEMENT  2023   • WRIST SURGERY         No family history on file. I have reviewed and agree with the history as documented.     E-Cigarette/Vaping   • E-Cigarette Use Never User      E-Cigarette/Vaping Substances     Social History     Tobacco Use   • Smoking status: Every Day     Packs/day: 1.00     Years: 30.00     Total pack years: 30.00     Types: Cigarettes   • Smokeless tobacco: Never Vaping Use   • Vaping Use: Never used   Substance Use Topics   • Alcohol use: Not Currently   • Drug use: No     Comment: Is in pain mangement,  to get off pain meds. Review of Systems   Constitutional: Positive for activity change ( Decreased), appetite change ( Decreased secondary to pain) and fatigue. Negative for chills, diaphoresis and fever. HENT: Negative for ear pain, rhinorrhea, sinus pressure and sore throat. Eyes: Negative for photophobia, pain and visual disturbance. Respiratory: Positive for chest tightness ( Baseline). Negative for cough and shortness of breath. Cardiovascular: Negative for chest pain and palpitations. Gastrointestinal: Positive for abdominal pain ( Right upper quadrant), diarrhea ( At baseline, not worsened), nausea and vomiting ( Now dry heaving). Negative for abdominal distention, blood in stool and constipation. Endocrine: Negative for polyuria. Genitourinary: Positive for difficulty urinating (Chronic) and flank pain ( Right-sided). Negative for dysuria, frequency, hematuria and urgency. Musculoskeletal: Positive for myalgias. Negative for arthralgias and back pain. Skin: Negative for color change, rash and wound. Neurological: Positive for weakness and headaches. Negative for dizziness, seizures, syncope and light-headedness. Psychiatric/Behavioral: Positive for sleep disturbance ( Secondary to pain). All other systems reviewed and are negative. Physical Exam  Physical Exam  Vitals and nursing note reviewed. Constitutional:       General: He is not in acute distress. Appearance: He is well-developed. He is not ill-appearing, toxic-appearing or diaphoretic. HENT:      Head: Normocephalic and atraumatic. Eyes:      General: No scleral icterus. Conjunctiva/sclera: Conjunctivae normal.   Cardiovascular:      Rate and Rhythm: Normal rate and regular rhythm. Heart sounds: No murmur heard.   Pulmonary:      Effort: Pulmonary effort is normal. No respiratory distress. Breath sounds: Normal breath sounds. Chest:      Chest wall: No tenderness. Abdominal:      General: Bowel sounds are normal. There is no distension. There are no signs of injury. Palpations: Abdomen is soft. Tenderness: There is abdominal tenderness in the right upper quadrant and right lower quadrant. There is right CVA tenderness and guarding. There is no left CVA tenderness or rebound. Positive signs include Alexandra's sign. Negative signs include Rovsing's sign and McBurney's sign. Musculoskeletal:         General: No swelling. Cervical back: Neck supple. Skin:     General: Skin is warm and dry. Capillary Refill: Capillary refill takes less than 2 seconds. Coloration: Skin is not cyanotic, jaundiced or pale. Findings: No erythema or rash. Neurological:      General: No focal deficit present. Mental Status: He is alert and oriented to person, place, and time.    Psychiatric:         Mood and Affect: Mood normal.         Behavior: Behavior normal.         Vital Signs  ED Triage Vitals [07/28/23 0944]   Temperature Pulse Respirations Blood Pressure SpO2   (!) 97.4 °F (36.3 °C) 105 18 112/82 94 %      Temp Source Heart Rate Source Patient Position - Orthostatic VS BP Location FiO2 (%)   Oral Monitor Sitting Right arm --      Pain Score       10 - Worst Possible Pain           Vitals:    07/28/23 1959 07/28/23 2328 07/28/23 2337 07/29/23 0744   BP: 104/70 119/82 119/82 118/80   Pulse: 85 85 84 87   Patient Position - Orthostatic VS: Lying            Visual Acuity  Visual Acuity    Flowsheet Row Most Recent Value   L Pupil Size (mm) 3   R Pupil Size (mm) 3   L Pupil Shape Round   R Pupil Shape Round          ED Medications  Medications   lactated ringers infusion (75 mL/hr Intravenous Rate/Dose Change 7/29/23 1331)   potassium chloride 20 mEq IVPB (premix) (20 mEq Intravenous New Bag 7/28/23 1505)     Followed by   potassium chloride 20 mEq IVPB (premix) (has no administration in time range)   ALPRAZolam (XANAX) tablet 1 mg (1 mg Oral Given 7/29/23 1018)   umeclidinium-vilanterol 62.5-25 mcg/actuation inhaler 1 puff (1 puff Inhalation Not Given 7/29/23 0915)   albuterol (PROVENTIL HFA,VENTOLIN HFA) inhaler 1 puff (has no administration in time range)   ondansetron (ZOFRAN) injection 4 mg (has no administration in time range)   acetaminophen (TYLENOL) tablet 650 mg (has no administration in time range)   nicotine (NICODERM CQ) 7 mg/24hr TD 24 hr patch 1 patch (1 patch Transdermal Not Given 7/29/23 0834)   enoxaparin (LOVENOX) subcutaneous injection 40 mg (40 mg Subcutaneous Given 7/29/23 0827)   metoprolol (LOPRESSOR) injection 2.5 mg (has no administration in time range)   HYDROmorphone HCl (DILAUDID) injection 0.2 mg (0.2 mg Intravenous Given 7/29/23 0827)   HYDROmorphone (DILAUDID) injection 0.5 mg (0.5 mg Intravenous Given 7/28/23 2318)   saliva substitute (MOUTH KOTE) mucosal solution 5 spray (has no administration in time range)   calcium carbonate (TUMS) chewable tablet 500 mg (has no administration in time range)   simethicone (MYLICON) chewable tablet 80 mg (has no administration in time range)   levofloxacin (LEVAQUIN) IVPB (premix in dextrose) 750 mg 150 mL (750 mg Intravenous New Bag 7/28/23 1840)   metroNIDAZOLE (FLAGYL) IVPB (premix) 500 mg 100 mL (500 mg Intravenous New Bag 7/29/23 0827)   ondansetron (ZOFRAN) injection 4 mg (4 mg Intravenous Given 7/28/23 1042)   HYDROmorphone (DILAUDID) injection 0.5 mg (0.5 mg Intravenous Given 7/28/23 1044)   lactated ringers bolus 1,000 mL (0 mL Intravenous Stopped 7/28/23 1249)   potassium chloride oral solution 40 mEq (40 mEq Oral Given 7/28/23 1157)   magnesium sulfate 2 g/50 mL IVPB (premix) 2 g (0 g Intravenous Stopped 7/28/23 1508)   iohexol (OMNIPAQUE) 350 MG/ML injection (SINGLE-DOSE) 100 mL (100 mL Intravenous Given 7/28/23 1148)   piperacillin-tazobactam (ZOSYN) IVPB 4.5 g (0 g Intravenous Stopped 7/28/23 1347)   HYDROmorphone (DILAUDID) injection 1 mg (1 mg Intravenous Given 7/28/23 1306)   diphenhydrAMINE (BENADRYL) injection 50 mg (50 mg Intravenous Given 7/28/23 1350)   albuterol inhalation solution 2.5 mg (2.5 mg Nebulization Given 7/28/23 1352)   methylPREDNISolone sodium succinate (Solu-MEDROL) injection 125 mg (125 mg Intravenous Given 7/28/23 1350)   Famotidine (PF) (PEPCID) injection 40 mg (40 mg Intravenous Given 7/28/23 1352)   midazolam (VERSED) injection (0.5 mg Intravenous Given 7/28/23 1654)   fentanyl citrate (PF) 100 MCG/2ML (25 mcg Intravenous Given 7/28/23 1654)   lidocaine 1% buffered (10 mL Infiltration Given 7/28/23 1704)       Diagnostic Studies  Results Reviewed     Procedure Component Value Units Date/Time    Anaerobic culture and Gram stain [[id number]] Collected: 07/28/23 1709    Lab Status: Preliminary result Specimen: Body Fluid from Other Updated: 07/29/23 1118     Anaerobic Culture Culture results to follow. Body fluid culture and Gram stain [[id number]] Collected: 07/28/23 1707    Lab Status: Preliminary result Specimen:  Body Fluid from Gallbladder Updated: 07/29/23 0809     Gram Stain Result 3+ Polys      No bacteria seen    Protime-INR [[id number]]  (Normal) Collected: 07/28/23 1521    Lab Status: Final result Specimen: Blood from Arm, Right Updated: 07/28/23 1548     Protime 13.3 seconds      INR 1.00    APTT [[id number]]  (Normal) Collected: 07/28/23 1521    Lab Status: Final result Specimen: Blood from Arm, Right Updated: 07/28/23 1548     PTT 31 seconds     CMP [[id number]]  (Abnormal) Collected: 07/28/23 1034    Lab Status: Final result Specimen: Blood from Arm, Right Updated: 07/28/23 1112     Sodium 140 mmol/L      Potassium 2.3 mmol/L      Chloride 89 mmol/L      CO2 39 mmol/L      ANION GAP 12 mmol/L      BUN 3 mg/dL      Creatinine 1.00 mg/dL      Glucose 125 mg/dL      Calcium 9.3 mg/dL      AST 12 U/L      ALT 6 U/L      Alkaline Phosphatase 93 U/L      Total Protein 6.8 g/dL      Albumin 3.6 g/dL      Total Bilirubin 0.48 mg/dL      eGFR 90 ml/min/1.73sq m     Narrative:      National Kid[employer] Foundation guidelines for Chronic Kidney Disease (CKD):   •  Stage 1 with normal or high GFR (GFR > 90 mL/min/1.73 square meters)  •  Stage 2 Mild CKD (GFR = 60-89 mL/min/1.73 square meters)  •  Stage 3A Moderate CKD (GFR = 45-59 mL/min/1.73 square meters)  •  Stage 3B Moderate CKD (GFR = 30-44 mL/min/1.73 square meters)  •  Stage 4 Severe CKD (GFR = 15-29 mL/min/1.73 square meters)  •  Stage 5 End Stage CKD (GFR <15 mL/min/1.73 square meters)  Note: GFR calculation is accurate only with a steady state creatinine    Lipase [[id number]]  (Normal) Collected: 07/28/23 1034    Lab Status: Final result Specimen: Blood from Arm, Right Updated: 07/28/23 1110     Lipase 23 u/L     Magnesium [[id number]]  (Abnormal) Collected: 07/28/23 1034    Lab Status: Final result Specimen: Blood from Arm, Right Updated: 07/28/23 1110     Magnesium 1.7 mg/dL     CBC and differential [[id number]]  (Abnormal) Collected: 07/28/23 1034    Lab Status: Final result Specimen: Blood from Arm, Right Updated: 07/28/23 1040     WBC 11.65 Thousand/uL      RBC 5.01 Million/uL      Hemoglobin 14.1 g/dL      Hematocrit 41.1 %      MCV 82 fL      MCH 28.1 pg      MCHC 34.3 g/dL      RDW 14.0 %      MPV 9.6 fL      Platelets 847 Thousands/uL      nRBC 0 /100 WBCs      Neutrophils Relative 66 %      Immat GRANS % 1 %      Lymphocytes Relative 25 %      Monocytes Relative 6 %      Eosinophils Relative 1 %      Basophils Relative 1 %      Neutrophils Absolute 7.85 Thousands/µL      Immature Grans Absolute 0.08 Thousand/uL      Lymphocytes Absolute 2.86 Thousands/µL      Monocytes Absolute 0.67 Thousand/µL      Eosinophils Absolute 0.11 Thousand/µL      Basophils Absolute 0.08 Thousands/µL     UA w Reflex to Microscopic w Reflex to Culture [[id number]]     Lab Status: No result Specimen: Urine                  IR cholecystostomy tube placement   Final Result by Alyson Mtz MD (07/28 4589)   Impression: Successful cholecystostomy tube placement. 85 cc of tan purulent fluid was aspirated and sent for analysis. Plan:  Tube to bag drainage. Flush tube with 10cc NSS qd. Return in 2 months for a tube check to assess cystic duct patency and confirm tube is still in satisfactory position. Tube will remain in place until surgery. Workstation performed: [id number]         US right upper quadrant   Final Result by Devin Banks MD (07/28 1520)      Cholelithiasis and gallbladder sludge with gallbladder wall thickening. Patient is medicated limiting evaluation for focal tenderness however findings suggest acute cholecystitis. Perihepatic cystic structure. Workstation performed: [id number]         CT abdomen pelvis with contrast   Final Result by Dewayne Phipps MD (07/28 1303)   Gallbladder wall thickening and pericholecystic inflammatory change concerning for acute cholecystitis. Although no choleliths are identified by CT technique, the patient reports a prior history of gallstones. I personally discussed this study with [address] on 7/28/2023 12:55 PM.               Workstation performed: [id number]         IR cholecystostomy tube check/change/reposition/reinsertion/upsize    (Results Pending)              Procedures  Procedures  Conscious Sedation Assessment    Flowsheet Row Classification Score   ASA Scale Assessment 2-Mild to moderate systemic disease, medically well controlled, with no functional limitation filed at 07/28/2023 1636   Mallampati Classification Class II: soft palate, uvula, fauces visible - No Difficulty filed at 07/28/2023 1636             ED Course                               SBIRT 20yo+    Flowsheet Row Most Recent Value   Initial Alcohol Screen: US AUDIT-C     1.  How often do you have a drink containing alcohol? 0 Filed at: 07/28/2023 0947   2. How many drinks containing alcohol do you have on a typical day you are drinking? 0 Filed at: 07/28/2023 0947   3a. Male UNDER 65: How often do you have five or more drinks on one occasion? 0 Filed at: 07/28/2023 0947   Audit-C Score 0 Filed at: 07/28/2023 3932   STEVENSON: How many times in the past year have you. .. Used an illegal drug or used a prescription medication for non-medical reasons? Never Filed at: 07/28/2023 1059                    Medical Decision Making  Acute cholecystitis: acute illness or injury     Details: Patient with acute cholecystitis  Patient volume resuscitated and given Zosyn in the ED  Patient to be admitted on surgical service for further evaluation and treatment  Allergic reaction, initial encounter: acute illness or injury     Details: Acute allergic reaction to Zosyn as evidenced by skin changes, shortness of breath and itching while in the ED  Patient given Solu-Medrol, Benadryl, albuterol and Pepcid  Reaction resolved  Note made on patient's chart of allergy to Zosyn  Hypokalemia: acute illness or injury     Details: Likely related to diarrhea and vomiting  Potassium repleted with oral and IV potassium  Repeat labs as inpatient  Hypomagnesemia: acute illness or injury     Details: Likely related to diarrhea and vomiting  Magnesium repleted with 2 g IV magnesium  Amount and/or Complexity of Data Reviewed  Labs: ordered. Radiology: ordered. Risk  Prescription drug management. Decision regarding hospitalization.           Disposition  Final diagnoses:   Acute cholecystitis   Hypomagnesemia   Hypokalemia   Allergic reaction, initial encounter     Time reflects when diagnosis was documented in both MDM as applicable and the Disposition within this note     Time User Action Codes Description Comment    7/28/2023  1:12 PM [place] Aceves Add [K81.0] Acute cholecystitis     7/29/2023  1:30 PM Francisca Aceves Add [E83.42] Hypomagnesemia     7/29/2023  1:30 PM Afshin Labrador Add [E87.6] Hypokalemia     7/29/2023  1:31 PM Afshin Labrador Add [T78.40XA] Allergic reaction, initial encounter       ED Disposition     ED Disposition   Admit    Condition   Stable    Date/Time   Fri Jul 28, 2023  1:51 PM    Comment   Case was discussed with General Surgery and the patient's admission status was agreed to be Admission Status: inpatient status to the service of Dr. Francoise Landrum . Follow-up Information    None         Current Discharge Medication List      START taking these medications    Details   sodium chloride, PF, 0.9 % 10 mL by Intracatheter route daily Intracatheter flushing daily. May substitute prefilled syringe with normal saline 10 mL vials, 10 mL syringes, and 18 g blunt needles  Qty: 900 mL, Refills: 0    Associated Diagnoses: Acute cholecystitis         CONTINUE these medications which have NOT CHANGED    Details   albuterol (PROVENTIL HFA,VENTOLIN HFA) 90 mcg/act inhaler INL 2 PFS PO Q 4 H PRF WHZ  Refills: 3    Comments: Substitution to a formulary equivalent within the same pharmaceutical class is authorized.       ALPRAZolam (XANAX) 1 mg tablet TAKE 1 TABLET BY MOUTH THREE TIMES A DAY AS NEEDED      Ajovy 225 MG/1.5ML auto-injector INJECT (225MG) BY SUBCUTANEOUS ROUTE EVERY MONTH IN THE ABDOMEN, THIGH, OR UPPER ARM      Anoro Ellipta 62.5-25 MCG/INH inhaler INHALE 1 PUFF BY INHALATION ROUTE EVERY DAY AT THE SAME TIME EACH DAY      benzonatate (TESSALON) 200 MG capsule Take 1 capsule (200 mg total) by mouth 3 (three) times a day as needed for cough  Qty: 20 capsule, Refills: 0    Associated Diagnoses: Acute bacterial bronchitis      buprenorphine-naloxone (SUBOXONE) 8-2 mg DIS 1 FILM UNDER THE TONGUE TID          escitalopram (LEXAPRO) 10 mg tablet TAKE 1 TABLET DAILY      fluticasone (FLONASE) 50 mcg/act nasal spray 1 spray into each nostril daily  Qty: 9.9 mL, Refills: 0    Associated Diagnoses: Acute bacterial bronchitis      methylPREDNISolone 4 MG tablet therapy pack Use as directed on package  Qty: 21 tablet, Refills: 0    Associated Diagnoses: Acute bacterial bronchitis      mirtazapine (REMERON) 15 mg tablet TAKE 1 TABLET ONCE DAILY INTHE EVENING BEFORE BEDTIME      Trokendi  MG CP24 TAKE 1 CAPSULE BY ORAL ROUTE EVERY DAY AT NIGHT             Outpatient Discharge Orders   IR cholecystostomy tube check/change/reposition/reinsertion/upsize   Standing Status: Future Standing Exp.  Date: 07/28/27     Drainage Tube Home Care - (Does not include  Asept/Tenkhoff/PD catheters and G/GJ tubes)       PDMP Review     None          ED Provider  Electronically Signed by           Rose Warner PA-C  07/29/23 4429

## 2023-07-29 LAB
ALBUMIN SERPL BCP-MCNC: 3.3 G/DL (ref 3.5–5)
ALP SERPL-CCNC: 107 U/L (ref 34–104)
ALT SERPL W P-5'-P-CCNC: 10 U/L (ref 7–52)
ANION GAP SERPL CALCULATED.3IONS-SCNC: 9 MMOL/L
AST SERPL W P-5'-P-CCNC: 17 U/L (ref 13–39)
BASOPHILS # BLD AUTO: 0.01 THOUSANDS/ÂΜL (ref 0–0.1)
BASOPHILS NFR BLD AUTO: 0 % (ref 0–1)
BILIRUB SERPL-MCNC: 0.47 MG/DL (ref 0.2–1)
BUN SERPL-MCNC: 8 MG/DL (ref 5–25)
CALCIUM ALBUM COR SERPL-MCNC: 9.8 MG/DL (ref 8.3–10.1)
CALCIUM SERPL-MCNC: 9.2 MG/DL (ref 8.4–10.2)
CHLORIDE SERPL-SCNC: 94 MMOL/L (ref 96–108)
CO2 SERPL-SCNC: 36 MMOL/L (ref 21–32)
CREAT SERPL-MCNC: 0.9 MG/DL (ref 0.6–1.3)
EOSINOPHIL # BLD AUTO: 0.01 THOUSAND/ÂΜL (ref 0–0.61)
EOSINOPHIL NFR BLD AUTO: 0 % (ref 0–6)
ERYTHROCYTE [DISTWIDTH] IN BLOOD BY AUTOMATED COUNT: 14 % (ref 11.6–15.1)
GFR SERPL CREATININE-BSD FRML MDRD: 102 ML/MIN/1.73SQ M
GLUCOSE SERPL-MCNC: 141 MG/DL (ref 65–140)
HCT VFR BLD AUTO: 36.9 % (ref 36.5–49.3)
HGB BLD-MCNC: 12.3 G/DL (ref 12–17)
IMM GRANULOCYTES # BLD AUTO: 0.08 THOUSAND/UL (ref 0–0.2)
IMM GRANULOCYTES NFR BLD AUTO: 1 % (ref 0–2)
LYMPHOCYTES # BLD AUTO: 1.13 THOUSANDS/ÂΜL (ref 0.6–4.47)
LYMPHOCYTES NFR BLD AUTO: 8 % (ref 14–44)
MAGNESIUM SERPL-MCNC: 1.9 MG/DL (ref 1.9–2.7)
MCH RBC QN AUTO: 28 PG (ref 26.8–34.3)
MCHC RBC AUTO-ENTMCNC: 33.3 G/DL (ref 31.4–37.4)
MCV RBC AUTO: 84 FL (ref 82–98)
MONOCYTES # BLD AUTO: 0.4 THOUSAND/ÂΜL (ref 0.17–1.22)
MONOCYTES NFR BLD AUTO: 3 % (ref 4–12)
NEUTROPHILS # BLD AUTO: 12.78 THOUSANDS/ÂΜL (ref 1.85–7.62)
NEUTS SEG NFR BLD AUTO: 88 % (ref 43–75)
NRBC BLD AUTO-RTO: 0 /100 WBCS
PHOSPHATE SERPL-MCNC: 2.8 MG/DL (ref 2.7–4.5)
PLATELET # BLD AUTO: 293 THOUSANDS/UL (ref 149–390)
PMV BLD AUTO: 9.8 FL (ref 8.9–12.7)
POTASSIUM SERPL-SCNC: 3.2 MMOL/L (ref 3.5–5.3)
PROT SERPL-MCNC: 6.2 G/DL (ref 6.4–8.4)
RBC # BLD AUTO: 4.4 MILLION/UL (ref 3.88–5.62)
SODIUM SERPL-SCNC: 139 MMOL/L (ref 135–147)
WBC # BLD AUTO: 14.41 THOUSAND/UL (ref 4.31–10.16)

## 2023-07-29 PROCEDURE — 84100 ASSAY OF PHOSPHORUS: CPT | Performed by: PHYSICIAN ASSISTANT

## 2023-07-29 PROCEDURE — 85025 COMPLETE CBC W/AUTO DIFF WBC: CPT | Performed by: PHYSICIAN ASSISTANT

## 2023-07-29 PROCEDURE — 83735 ASSAY OF MAGNESIUM: CPT | Performed by: PHYSICIAN ASSISTANT

## 2023-07-29 PROCEDURE — 99233 SBSQ HOSP IP/OBS HIGH 50: CPT | Performed by: SURGERY

## 2023-07-29 PROCEDURE — 80053 COMPREHEN METABOLIC PANEL: CPT | Performed by: PHYSICIAN ASSISTANT

## 2023-07-29 RX ORDER — HYDROMORPHONE HCL IN WATER/PF 6 MG/30 ML
0.2 PATIENT CONTROLLED ANALGESIA SYRINGE INTRAVENOUS EVERY 4 HOURS PRN
Status: DISCONTINUED | OUTPATIENT
Start: 2023-07-29 | End: 2023-07-31

## 2023-07-29 RX ORDER — POTASSIUM CHLORIDE 29.8 MG/ML
40 INJECTION INTRAVENOUS 2 TIMES DAILY
Status: DISCONTINUED | OUTPATIENT
Start: 2023-07-29 | End: 2023-07-29

## 2023-07-29 RX ORDER — POTASSIUM CHLORIDE 14.9 MG/ML
20 INJECTION INTRAVENOUS ONCE
Status: COMPLETED | OUTPATIENT
Start: 2023-07-29 | End: 2023-07-29

## 2023-07-29 RX ORDER — KETOROLAC TROMETHAMINE 30 MG/ML
15 INJECTION, SOLUTION INTRAMUSCULAR; INTRAVENOUS 2 TIMES DAILY
Status: COMPLETED | OUTPATIENT
Start: 2023-07-29 | End: 2023-07-30

## 2023-07-29 RX ORDER — POTASSIUM CHLORIDE 14.9 MG/ML
20 INJECTION INTRAVENOUS ONCE
Status: DISCONTINUED | OUTPATIENT
Start: 2023-07-29 | End: 2023-07-30

## 2023-07-29 RX ORDER — OXYCODONE HYDROCHLORIDE 5 MG/1
5 TABLET ORAL EVERY 4 HOURS PRN
Status: DISCONTINUED | OUTPATIENT
Start: 2023-07-29 | End: 2023-07-31

## 2023-07-29 RX ADMIN — OXYCODONE HYDROCHLORIDE 5 MG: 5 TABLET ORAL at 16:34

## 2023-07-29 RX ADMIN — KETOROLAC TROMETHAMINE 15 MG: 30 INJECTION, SOLUTION INTRAMUSCULAR at 14:02

## 2023-07-29 RX ADMIN — KETOROLAC TROMETHAMINE 15 MG: 30 INJECTION, SOLUTION INTRAMUSCULAR at 22:31

## 2023-07-29 RX ADMIN — SODIUM CHLORIDE, SODIUM LACTATE, POTASSIUM CHLORIDE, AND CALCIUM CHLORIDE 75 ML/HR: .6; .31; .03; .02 INJECTION, SOLUTION INTRAVENOUS at 22:52

## 2023-07-29 RX ADMIN — ENOXAPARIN SODIUM 40 MG: 40 INJECTION SUBCUTANEOUS at 08:27

## 2023-07-29 RX ADMIN — LEVOFLOXACIN 750 MG: 750 INJECTION, SOLUTION INTRAVENOUS at 17:53

## 2023-07-29 RX ADMIN — METRONIDAZOLE 500 MG: 500 INJECTION, SOLUTION INTRAVENOUS at 08:27

## 2023-07-29 RX ADMIN — HYDROMORPHONE HYDROCHLORIDE 0.5 MG: 1 INJECTION, SOLUTION INTRAMUSCULAR; INTRAVENOUS; SUBCUTANEOUS at 18:03

## 2023-07-29 RX ADMIN — HYDROMORPHONE HYDROCHLORIDE 0.2 MG: 0.2 INJECTION, SOLUTION INTRAMUSCULAR; INTRAVENOUS; SUBCUTANEOUS at 08:27

## 2023-07-29 RX ADMIN — ALPRAZOLAM 1 MG: 0.5 TABLET ORAL at 10:18

## 2023-07-29 RX ADMIN — POTASSIUM CHLORIDE 20 MEQ: 14.9 INJECTION, SOLUTION INTRAVENOUS at 16:55

## 2023-07-29 RX ADMIN — POTASSIUM CHLORIDE 20 MEQ: 14.9 INJECTION, SOLUTION INTRAVENOUS at 14:02

## 2023-07-29 RX ADMIN — SODIUM CHLORIDE, SODIUM LACTATE, POTASSIUM CHLORIDE, AND CALCIUM CHLORIDE 125 ML/HR: .6; .31; .03; .02 INJECTION, SOLUTION INTRAVENOUS at 01:48

## 2023-07-29 RX ADMIN — METRONIDAZOLE 500 MG: 500 INJECTION, SOLUTION INTRAVENOUS at 16:55

## 2023-07-29 NOTE — PROGRESS NOTES
Progress Note - General Surgery   Gurmeet Beatty 41 y.o. male MRN: [id number]  Unit/Bed#: [address] [address] Encounter: [id number]    Assessment:  1) acute cholecystitis status post percutaneous cholecystostomy tube placement -AVSS, still experiencing quite a bit of upper right quadrant tenderness status post drain placement, leukocytosis up trended slightly at 14.41, potassium is 3.2, hemoglobin is stable, approximately 135 mL over the last 24 hours of output in cholecystostomy tube bag but is primarily bilious in nature, has an appetite    Plan:  1)   -Continue I/O  -Continue percutaneous cholecystostomy tube bag  -Replete potassium with 40 mEq twice daily  -Recheck labs with CBC, CMP, mag, Phos  -Decrease IV fluids to 75 mill per hour  -Increase diet to clear liquids  -Continue as needed analgesia  -Continue as needed Zofran  -Continue DVT prophylaxis  -Continue levofloxacin and Flagyl  -Pending cultures from aspiration  -Added Percocet dosing  -Review home medications    Subjective/Objective   Chief Complaint: I am still having a lot of pain    Subjective: Patient was seen examined at bedside. Patient denies any acute events overnight. Patient denies any fevers or chills. Patient does have significant pain in the upper right quadrant. Patient denies any nausea or vomiting. Patient's pain is particularly in the epigastric region radiating through the right upper quadrant up to the point of the drain. Patient does have exacerbation of pain with coughing or hiccups. Patient has not been up and mobilizing yet. Patient does have an appetite and would like to try clear liquids. Objective:     Blood pressure 118/80, pulse 87, temperature 98.1 °F (36.7 °C), resp. rate 16, SpO2 (!) 88 %. ,There is no height or weight on file to calculate BMI.       Intake/Output Summary (Last 24 hours) at 7/29/2023 1325  Last data filed at 7/29/2023 0700  Gross per 24 hour   Intake 1510 ml   Output 135 ml   Net 1375 ml Invasive Devices     Peripheral Intravenous Line  Duration           Peripheral IV 07/28/23 Right Antecubital 1 day    Peripheral IV 07/28/23 Left Antecubital <1 day          Drain  Duration           Cholecystostomy Tube <1 day                Physical Exam: /80   Pulse 87   Temp 98.1 °F (36.7 °C)   Resp 16   SpO2 (!) 88%   General appearance: alert and oriented, in no acute distress , uncomfortable   Head: Normocephalic, without obvious abnormality, atraumatic  Lungs: clear to auscultation bilaterally  Heart: regular rate and rhythm, S1, S2 normal, no murmur, click, rub or gallop  Abdomen: Soft, semiguarded at entire abdominal exam, tenderness in the epigastric and upper right quadrant, active bowel sounds nondistended   skin: Drain in place, otherwise skin is intact and well perfused    Lab, Imaging and other studies:  I have personally reviewed pertinent lab results.   , CBC:   Lab Results   Component Value Date    WBC 14.41 (H) 07/29/2023    HGB 12.3 07/29/2023    HCT 36.9 07/29/2023    MCV 84 07/29/2023     07/29/2023    RBC 4.40 07/29/2023    MCH 28.0 07/29/2023    MCHC 33.3 07/29/2023    RDW 14.0 07/29/2023    MPV 9.8 07/29/2023    NRBC 0 07/29/2023   , CMP:   Lab Results   Component Value Date    SODIUM 139 07/29/2023    K 3.2 (L) 07/29/2023    CL 94 (L) 07/29/2023    CO2 36 (H) 07/29/2023    BUN 8 07/29/2023    CREATININE 0.90 07/29/2023    CALCIUM 9.2 07/29/2023    AST 17 07/29/2023    ALT 10 07/29/2023    ALKPHOS 107 (H) 07/29/2023    EGFR 102 07/29/2023     VTE Pharmacologic Prophylaxis: Enoxaparin (Lovenox)  VTE Mechanical Prophylaxis: sequential compression device

## 2023-07-29 NOTE — UTILIZATION REVIEW
Initial Clinical Review    Admission: Date/Time/Statement:   Admission Orders (From admission, onward)     Ordered        07/28/23 1351  INPATIENT ADMISSION  Once                   Orders Placed This Encounter   Procedures   • INPATIENT ADMISSION     Standing Status:   Standing     Number of Occurrences:   1     Order Specific Question:   Level of Care     Answer:   Med Surg [16]     Order Specific Question:   Estimated length of stay     Answer:   More than 2 Midnights     Order Specific Question:   Certification     Answer:   I certify that inpatient services are medically necessary for this patient for a duration of greater than two midnights. See H&P and MD Progress Notes for additional information about the patient's course of treatment. ED Arrival Information     Expected   -    Arrival   7/28/2023 09:35    Acuity   Urgent            Means of arrival   Walk-In    Escorted by   Family Member    Service   Surgery-General    Admission type   Emergency            Arrival complaint   abd pain/vomiting        Chief Complaint   Patient presents with   • Abdominal Pain     Pt reports R upper quadrant pain X  1 week, Hx gallstones     Initial Presentation:   42 y/o male with PMHx COPD/ active every day smoker with history of chronic cholecystitis, Anxiety, and Depression - presents to [address] [place] [address] ED on 7/28/23 2nd almost two week history of worsening nausea, vomiting, and intermittent RUQ abdominal pain and bloating with episodes of loose stool but no BM x last 2 days. Reports pain worsens with bending over or sitting up from a lying position and that he last ate any food 6 days ago and has only been drinking some water. In ED -   Exam: bilateral wheezing, epigastric + RUQ abdominal tenderness. RUQ US shows gallbladder wall thickening and pericholecystic inflammatory change concerning for acute cholecystitis.  Labs: WBC 11,650  K+ 2.3 ED TX: NPO, IVF X 1 L, IV Mag SO4 x 3, IV Zosyn, IV Pepcid, IV Dilaudid x 2, IV Zofran. Admitted Inpatient 7/28/23 at 1351 -      Assessment:  • Acute cholecystitis with symptoms for almost two weeks -- CT scan revealed GB with thickened wall and some surrounding fluid, possible septations vs single small stone at the infundibulum? and dilated CBD. Afebrile, vitals stable. WBC Count 11.6, LFTs within normal limits  • Zosyn allergy -- in ED patient had full body flushing and SOB after zosyn administration      Plan:  RUQ ultrasound pending  Potassium and magnesium repletion   NPO, sips with meds  IV fluid hydration  IR consult for percutaneous cholecystostomy tube   Solu-medrol, Benadryl, Pepcid given for allergic reaction to zosyn  Will avoid ancef for now due to new found allergy, start Levaquin/flagyl   Med rec in chart is incorrect and will need updated  Nicotine patch      7/28/23 INT RADIOLOGY:  abdominal pain for 10 days now with acute cholecystitis. IR to place a percutaneous cholecystostomy tube today.    PROCEDURE: Impression: Successful cholecystostomy tube placement. 85 cc of tan purulent fluid was aspirated and sent for analysis.     7/29/23 GENERAL SURGERY:  Assessment:  1) acute cholecystitis status post percutaneous cholecystostomy tube placement  -AVSS, still experiencing quite a bit of upper right quadrant tenderness status post drain placement  - leukocytosis up trended slightly at 14.41, potassium is 3.2, hemoglobin is stable  -approximately 135 mL over the last 24 hours of output in cholecystostomy tube bag but is primarily bilious in nature     Plan:  -Continue I/O  -Continue percutaneous cholecystostomy tube bag  -Replete potassium with 40 mEq twice daily  -Recheck labs with CBC, CMP, mag, Phos  -Decrease IV fluids to 75 mill per hour  -Increase diet to clear liquids  -Continue as needed analgesia  -Continue as needed Zofran  -Continue DVT prophylaxis  -Continue levofloxacin and Flagyl  -Pending cultures from aspiration  -Added Percocet dosing      ED Triage Vitals [07/28/23 0944]   Temperature Pulse Respirations Blood Pressure SpO2   (!) 97.4 °F (36.3 °C) 105 18 112/82 94 %      Temp Source Heart Rate Source Patient Position - Orthostatic VS BP Location FiO2 (%)   Oral Monitor Sitting Right arm --      Pain Score       10 - Worst Possible Pain          Wt Readings from Last 1 Encounters:   06/22/21 78 kg (172 lb)     Additional Vital Signs:   Date/Time Temp Pulse Resp BP MAP (mmHg) SpO2 Calculated FIO2 (%) - Nasal Cannula Nasal Cannula O2 Flow Rate (L/min) O2 Device Patient Position - Orthostatic VS   07/29/23 07:44:38 -- 87 -- 118/80 93 88 % Abnormal  28 2 L/min Nasal cannula --   07/28/23 23:37:05 -- 84 -- 119/82 94 95 % -- -- -- --   07/28/23 23:28:49 98.1 °F (36.7 °C) 85 16 119/82 94 93 % -- -- -- --   07/28/23 19:59:32 97.4 °F (36.3 °C) Abnormal  85 18 104/70 81 94 % -- -- None (Room air) Lying   07/28/23 17:50:12 -- 85 -- 146/94 111 91 % 32 3 L/min Nasal cannula --   07/28/23 1717 -- 77 16 135/89 -- 96 % -- -- -- --   07/28/23 1713 -- 76 16 134/83 -- 96 % -- -- -- --   07/28/23 1707 -- 75 12 123/80 -- 95 % -- -- -- --   07/28/23 1702 -- 75 16 111/77 -- 92 % -- -- -- --   07/28/23 1700 -- 75 12 109/76 -- 94 % -- -- -- --   07/28/23 1657 -- 72 12 107/74 -- 94 % -- -- -- --   07/28/23 1651 -- 78 14 110/72 -- 89 % Abnormal  36 4 L/min Nasal cannula --   07/28/23 1415 -- 101 18 134/82 101 96 % 32 3 L/min Nasal cannula Lying   07/28/23 1400 -- 103 18 113/76 90 99 % -- -- None (Room air) Lying   07/28/23 1348 -- -- -- -- -- 89 % Abnormal  32 3 L/min Nasal cannula --   07/28/23 1300 -- 86 -- 103/64 78 89 % Abnormal  -- -- -- --   07/28/23 1200 -- 87 18 131/79 97 91 % -- -- None (Room air) Lying   07/28/23 1130 -- 85 18 113/79 93 92 % -- -- None (Room air) Lying   07/28/23 1000 -- 93 18 111/75 89 97 % -- -- None (Room air) Lying   07/28/23 0944 97.4 °F (36.3 °C) Abnormal  105 18 112/82 -- 94 % -- -- None (Room air) Sitting      07/28 0701   07/29 0700   I.V. 1500   NG/GT 10 Total Intake 1510   Emesis/NG output 135   Total Output 135   Net +1375     Pertinent Labs/Diagnostic Test Results:   IR cholecystostomy tube placement   Impression: Successful cholecystostomy tube placement. 85 cc of tan purulent fluid was aspirated and sent for analysis. Plan:  Tube to bag drainage. Flush tube with 10cc NSS qd. Return in 2 months for a tube check to assess cystic duct patency and confirm tube is still in satisfactory position. Tube will remain in place until surgery. US right upper quadrant   Gallbladder wall thickening and pericholecystic inflammatory change concerning for acute cholecystitis. Although no choleliths are identified by CT technique, the patient reports a prior history of gallstones.        Results from last 7 days   Lab Units 07/29/23  0607 07/28/23  1034   WBC Thousand/uL 14.41* 11.65*   HEMOGLOBIN g/dL 12.3 14.1   HEMATOCRIT % 36.9 41.1   PLATELETS Thousands/uL 293 328   NEUTROS ABS Thousands/µL 12.78* 7.85*       Results from last 7 days   Lab Units 07/29/23  0607 07/28/23  1034   SODIUM mmol/L 139 140   POTASSIUM mmol/L 3.2* 2.3*   CHLORIDE mmol/L 94* 89*   CO2 mmol/L 36* 39*   ANION GAP mmol/L 9 12   BUN mg/dL 8 3*   CREATININE mg/dL 0.90 1.00   EGFR ml/min/1.73sq m 102 90   CALCIUM mg/dL 9.2 9.3   MAGNESIUM mg/dL 1.9 1.7*   PHOSPHORUS mg/dL 2.8  --      Results from last 7 days   Lab Units 07/29/23  0607 07/28/23  1034   AST U/L 17 12*   ALT U/L 10 6*   ALK PHOS U/L 107* 93   TOTAL PROTEIN g/dL 6.2* 6.8   ALBUMIN g/dL 3.3* 3.6   TOTAL BILIRUBIN mg/dL 0.47 0.48         Results from last 7 days   Lab Units 07/29/23  0607 07/28/23  1034   GLUCOSE RANDOM mg/dL 141* 125     Results from last 7 days   Lab Units 07/28/23  1521   PROTIME seconds 13.3   INR  1.00   PTT seconds 31     Results from last 7 days   Lab Units 07/28/23  1034   LIPASE u/L 23     Results from last 7 days   Lab Units 07/28/23  1707   GRAM STAIN RESULT  3+ Polys  No bacteria seen   BODY FLUID CULTURE, STERILE  Culture too young- will reincubate     ED Treatment:   Medication Administration from 07/28/2023 0934 to 07/28/2023 1747       Date/Time Order Dose Route Action     07/28/2023 1042 EDT ondansetron (ZOFRAN) injection 4 mg 4 mg Intravenous Given     07/28/2023 1044 EDT HYDROmorphone (DILAUDID) injection 0.5 mg 0.5 mg Intravenous Given     07/28/2023 1249 EDT lactated ringers bolus 1,000 mL 0 mL Intravenous Stopped     07/28/2023 1049 EDT lactated ringers bolus 1,000 mL 1,000 mL Intravenous New Bag     07/28/2023 1157 EDT potassium chloride oral solution 40 mEq 40 mEq Oral Given     07/28/2023 1508 EDT magnesium sulfate 2 g/50 mL IVPB (premix) 2 g 0 g Intravenous Stopped     07/28/2023 1412 EDT magnesium sulfate 2 g/50 mL IVPB (premix) 2 g -- Intravenous Restarted     07/28/2023 1304 EDT magnesium sulfate 2 g/50 mL IVPB (premix) 2 g 0 g Intravenous Hold     07/28/2023 1158 EDT magnesium sulfate 2 g/50 mL IVPB (premix) 2 g 2 g Intravenous New Bag     07/28/2023 1148 EDT iohexol (OMNIPAQUE) 350 MG/ML injection (SINGLE-DOSE) 100 mL 100 mL Intravenous Given     07/28/2023 1347 EDT piperacillin-tazobactam (ZOSYN) IVPB 4.5 g 0 g Intravenous Stopped     07/28/2023 1306 EDT piperacillin-tazobactam (ZOSYN) IVPB 4.5 g 4.5 g Intravenous New Bag     07/28/2023 1306 EDT HYDROmorphone (DILAUDID) injection 1 mg 1 mg Intravenous Given     07/28/2023 1332 EDT potassium chloride 40 mEq IVPB (premix) 40 mEq Intravenous Not Given     07/28/2023 1433 EDT lactated ringers infusion 125 mL/hr Intravenous New Bag     07/28/2023 1505 EDT potassium chloride 20 mEq IVPB (premix) 20 mEq Intravenous New Bag     07/28/2023 1350 EDT diphenhydrAMINE (BENADRYL) injection 50 mg 50 mg Intravenous Given     07/28/2023 1352 EDT albuterol inhalation solution 2.5 mg 2.5 mg Nebulization Given     07/28/2023 1350 EDT methylPREDNISolone sodium succinate (Solu-MEDROL) injection 125 mg 125 mg Intravenous Given     07/28/2023 1352 EDT Famotidine (PF) (PEPCID) injection 40 mg 40 mg Intravenous Given     07/28/2023 1654 EDT midazolam (VERSED) injection 0.5 mg Intravenous Given     07/28/2023 1654 EDT fentanyl citrate (PF) 100 MCG/2ML 25 mcg Intravenous Given     07/28/2023 1704 EDT lidocaine 1% buffered 10 mL Infiltration Given        Past Medical History:   Diagnosis Date   • Allergic rhinitis due to allergen 10/1/2018   • Anxiety    • Chronic pain    • Depression 10/17/2016     Admitting Diagnosis: Acute cholecystitis [K81.0]  Abdominal pain [R10.9]    Age/Sex: 41 y.o. male    Admission Orders:  Telemetry  VS q4hrs  Nasal O2 at 2 L/min prn - Titrate SpO2 > 92 %  Continuous Pulse Oximetry  SCD  Up + OOB as tolerated  NPO - 7/29 Clear Liquid Diet  I+O q shift  Cholecystostomy Tube care - flush with 10 cc NSS daily, measure output q shift, change dressing prn   Daily weight    Scheduled Medications:  enoxaparin, 40 mg, Subcutaneous, Daily  ketorolac, 15 mg, Intravenous, BID  levofloxacin, 750 mg, Intravenous, Q24H  metroNIDAZOLE, 500 mg, Intravenous, Q8H  nicotine, 1 patch, Transdermal, Daily  potassium chloride, 20 mEq, Intravenous, Once X 3  umeclidinium-vilanterol, 1 puff, Inhalation, Daily    Continuous IV Infusions:  lactated ringers, 75 mL/hr, Intravenous, Continuous    PRN Meds:  acetaminophen, 650 mg, Oral, Q6H PRN  albuterol, 1 puff, Inhalation, Q6H PRN  ALPRAZolam, 1 mg, Oral, TID PRN - 7/29 X 1  calcium carbonate, 500 mg, Oral, Daily PRN  HYDROmorphone, 0.5 mg, Intravenous, Q3H PRN - 7/28 X 1  HYDROmorphone, 0.2 mg, Intravenous, Q4H PRN - 7/28 X 1, 7/29 X 1  metoprolol, 2.5 mg, Intravenous, Q6H PRN  ondansetron, 4 mg, Intravenous, Q6H PRN  oxyCODONE, 5 mg, Oral, Q4H PRN  saliva substitute, 5 spray, Mouth/Throat, 4x Daily PRN  simethicone, 80 mg, Oral, Q6H PRN    IP CONSULT TO ACUTE CARE SURGERY  INPATIENT CONSULT TO IR    Network Utilization Review Department  ATTENTION: Please call with any questions or concerns to [phone] and carefully listen to the prompts so that you are directed to the right person. All voicemails are confidential.  [place] all requests for admission clinical reviews, approved or denied determinations and any other requests to dedicated fax number below belonging to the campus where the patient is receiving treatment.  List of dedicated fax numbers for the Facilities:  [place] DENIALS (Administrative/Medical Necessity) [phone] [address] [facility] (Maternity/NICU/Pediatrics) [phone]   [address] [place] [address] [phone]   [facility] [address] [facility] [phone]   [address] [place] [address] [address] [place] [address] [id number] [place] [address] [phone]   [address] [address] [phone]

## 2023-07-29 NOTE — PLAN OF CARE
Problem: PAIN - ADULT  Goal: Verbalizes/displays adequate comfort level or baseline comfort level  Description: Interventions:  - Encourage patient to monitor pain and request assistance  - Assess pain using appropriate pain scale  - Administer analgesics based on type and severity of pain and evaluate response  - Implement non-pharmacological measures as appropriate and evaluate response  - Consider cultural and social influences on pain and pain management  - Notify physician/advanced practitioner if interventions unsuccessful or patient reports new pain  Outcome: Progressing     Problem: INFECTION - ADULT  Goal: Absence or prevention of progression during hospitalization  Description: INTERVENTIONS:  - Assess and monitor for signs and symptoms of infection  - Monitor lab/diagnostic results  - Monitor all insertion sites, i.e. indwelling lines, tubes, and drains  - Monitor endotracheal if appropriate and nasal secretions for changes in amount and color  - [place] appropriate cooling/warming therapies per order  - Administer medications as ordered  - Instruct and encourage patient and family to use good hand hygiene technique  - Identify and instruct in appropriate isolation precautions for identified infection/condition  Outcome: Progressing  Goal: Absence of fever/infection during neutropenic period  Description: INTERVENTIONS:  - Monitor WBC    Outcome: Progressing     Problem: GASTROINTESTINAL - ADULT  Goal: Minimal or absence of nausea and/or vomiting  Description: INTERVENTIONS:  - Administer IV fluids if ordered to ensure adequate hydration  - Maintain NPO status until nausea and vomiting are resolved  - Nasogastric tube if ordered  - Administer ordered antiemetic medications as needed  - Provide nonpharmacologic comfort measures as appropriate  - Advance diet as tolerated, if ordered  - Consider nutrition services referral to assist patient with adequate nutrition and appropriate food choices  Outcome: Progressing  Goal: Maintains or returns to baseline bowel function  Description: INTERVENTIONS:  - Assess bowel function  - Encourage oral fluids to ensure adequate hydration  - Administer IV fluids if ordered to ensure adequate hydration  - Administer ordered medications as needed  - Encourage mobilization and activity  - Consider nutritional services referral to assist patient with adequate nutrition and appropriate food choices  Outcome: Progressing  Goal: Maintains adequate nutritional intake  Description: INTERVENTIONS:  - Monitor percentage of each meal consumed  - Identify factors contributing to decreased intake, treat as appropriate  - Assist with meals as needed  - Monitor I&O, weight, and lab values if indicated  - Obtain nutrition services referral as needed  Outcome: Progressing

## 2023-07-29 NOTE — PLAN OF CARE
Problem: PAIN - ADULT  Goal: Verbalizes/displays adequate comfort level or baseline comfort level  Description: Interventions:  - Encourage patient to monitor pain and request assistance  - Assess pain using appropriate pain scale  - Administer analgesics based on type and severity of pain and evaluate response  - Implement non-pharmacological measures as appropriate and evaluate response  - Consider cultural and social influences on pain and pain management  - Notify physician/advanced practitioner if interventions unsuccessful or patient reports new pain  Outcome: Progressing     Problem: INFECTION - ADULT  Goal: Absence or prevention of progression during hospitalization  Description: INTERVENTIONS:  - Assess and monitor for signs and symptoms of infection  - Monitor lab/diagnostic results  - Monitor all insertion sites, i.e. indwelling lines, tubes, and drains  - Monitor endotracheal if appropriate and nasal secretions for changes in amount and color  - [place] appropriate cooling/warming therapies per order  - Administer medications as ordered  - Instruct and encourage patient and family to use good hand hygiene technique  - Identify and instruct in appropriate isolation precautions for identified infection/condition  Outcome: Progressing     Problem: GASTROINTESTINAL - ADULT  Goal: Minimal or absence of nausea and/or vomiting  Description: INTERVENTIONS:  - Administer IV fluids if ordered to ensure adequate hydration  - Maintain NPO status until nausea and vomiting are resolved  - Nasogastric tube if ordered  - Administer ordered antiemetic medications as needed  - Provide nonpharmacologic comfort measures as appropriate  - Advance diet as tolerated, if ordered  - Consider nutrition services referral to assist patient with adequate nutrition and appropriate food choices  Outcome: Progressing     Problem: GASTROINTESTINAL - ADULT  Goal: Maintains or returns to baseline bowel function  Description: INTERVENTIONS:  - Assess bowel function  - Encourage oral fluids to ensure adequate hydration  - Administer IV fluids if ordered to ensure adequate hydration  - Administer ordered medications as needed  - Encourage mobilization and activity  - Consider nutritional services referral to assist patient with adequate nutrition and appropriate food choices  Outcome: Progressing

## 2023-07-30 LAB
ALBUMIN SERPL BCP-MCNC: 2.9 G/DL (ref 3.5–5)
ALP SERPL-CCNC: 84 U/L (ref 34–104)
ALT SERPL W P-5'-P-CCNC: 6 U/L (ref 7–52)
ANION GAP SERPL CALCULATED.3IONS-SCNC: 7 MMOL/L
AST SERPL W P-5'-P-CCNC: 12 U/L (ref 13–39)
BACTERIA SPEC ANAEROBE CULT: NORMAL
BASOPHILS # BLD AUTO: 0.01 THOUSANDS/ÂΜL (ref 0–0.1)
BASOPHILS NFR BLD AUTO: 0 % (ref 0–1)
BILIRUB SERPL-MCNC: 0.34 MG/DL (ref 0.2–1)
BILIRUB UR QL STRIP: NEGATIVE
BUN SERPL-MCNC: 6 MG/DL (ref 5–25)
CALCIUM ALBUM COR SERPL-MCNC: 9.6 MG/DL (ref 8.3–10.1)
CALCIUM SERPL-MCNC: 8.7 MG/DL (ref 8.4–10.2)
CHLORIDE SERPL-SCNC: 96 MMOL/L (ref 96–108)
CLARITY UR: CLEAR
CO2 SERPL-SCNC: 39 MMOL/L (ref 21–32)
COLOR UR: NORMAL
CREAT SERPL-MCNC: 0.74 MG/DL (ref 0.6–1.3)
EOSINOPHIL # BLD AUTO: 0 THOUSAND/ÂΜL (ref 0–0.61)
EOSINOPHIL NFR BLD AUTO: 0 % (ref 0–6)
ERYTHROCYTE [DISTWIDTH] IN BLOOD BY AUTOMATED COUNT: 14.3 % (ref 11.6–15.1)
GFR SERPL CREATININE-BSD FRML MDRD: 111 ML/MIN/1.73SQ M
GLUCOSE SERPL-MCNC: 104 MG/DL (ref 65–140)
GLUCOSE UR STRIP-MCNC: NEGATIVE MG/DL
HCT VFR BLD AUTO: 34.3 % (ref 36.5–49.3)
HGB BLD-MCNC: 11.7 G/DL (ref 12–17)
HGB UR QL STRIP.AUTO: NEGATIVE
IMM GRANULOCYTES # BLD AUTO: 0.06 THOUSAND/UL (ref 0–0.2)
IMM GRANULOCYTES NFR BLD AUTO: 0 % (ref 0–2)
KETONES UR STRIP-MCNC: NEGATIVE MG/DL
LEUKOCYTE ESTERASE UR QL STRIP: NEGATIVE
LYMPHOCYTES # BLD AUTO: 1.42 THOUSANDS/ÂΜL (ref 0.6–4.47)
LYMPHOCYTES NFR BLD AUTO: 10 % (ref 14–44)
MAGNESIUM SERPL-MCNC: 1.7 MG/DL (ref 1.9–2.7)
MCH RBC QN AUTO: 28.5 PG (ref 26.8–34.3)
MCHC RBC AUTO-ENTMCNC: 34.1 G/DL (ref 31.4–37.4)
MCV RBC AUTO: 84 FL (ref 82–98)
MONOCYTES # BLD AUTO: 0.66 THOUSAND/ÂΜL (ref 0.17–1.22)
MONOCYTES NFR BLD AUTO: 5 % (ref 4–12)
NEUTROPHILS # BLD AUTO: 11.7 THOUSANDS/ÂΜL (ref 1.85–7.62)
NEUTS SEG NFR BLD AUTO: 85 % (ref 43–75)
NITRITE UR QL STRIP: NEGATIVE
NRBC BLD AUTO-RTO: 0 /100 WBCS
PH UR STRIP.AUTO: 7.5 [PH]
PHOSPHATE SERPL-MCNC: 2.7 MG/DL (ref 2.7–4.5)
PLATELET # BLD AUTO: 214 THOUSANDS/UL (ref 149–390)
PMV BLD AUTO: 10 FL (ref 8.9–12.7)
POTASSIUM SERPL-SCNC: 2.4 MMOL/L (ref 3.5–5.3)
PROT SERPL-MCNC: 5.6 G/DL (ref 6.4–8.4)
PROT UR STRIP-MCNC: NEGATIVE MG/DL
RBC # BLD AUTO: 4.1 MILLION/UL (ref 3.88–5.62)
SODIUM SERPL-SCNC: 142 MMOL/L (ref 135–147)
SP GR UR STRIP.AUTO: 1.01 (ref 1–1.03)
UROBILINOGEN UR QL STRIP.AUTO: 0.2 E.U./DL
WBC # BLD AUTO: 13.85 THOUSAND/UL (ref 4.31–10.16)

## 2023-07-30 PROCEDURE — 99233 SBSQ HOSP IP/OBS HIGH 50: CPT | Performed by: PHYSICIAN ASSISTANT

## 2023-07-30 PROCEDURE — 81003 URINALYSIS AUTO W/O SCOPE: CPT | Performed by: PHYSICIAN ASSISTANT

## 2023-07-30 PROCEDURE — 80053 COMPREHEN METABOLIC PANEL: CPT | Performed by: PHYSICIAN ASSISTANT

## 2023-07-30 PROCEDURE — 83735 ASSAY OF MAGNESIUM: CPT | Performed by: PHYSICIAN ASSISTANT

## 2023-07-30 PROCEDURE — 84100 ASSAY OF PHOSPHORUS: CPT | Performed by: PHYSICIAN ASSISTANT

## 2023-07-30 PROCEDURE — 85025 COMPLETE CBC W/AUTO DIFF WBC: CPT | Performed by: PHYSICIAN ASSISTANT

## 2023-07-30 RX ORDER — POTASSIUM CHLORIDE 14.9 MG/ML
20 INJECTION INTRAVENOUS ONCE
Status: COMPLETED | OUTPATIENT
Start: 2023-07-30 | End: 2023-07-30

## 2023-07-30 RX ORDER — MAGNESIUM SULFATE HEPTAHYDRATE 40 MG/ML
2 INJECTION, SOLUTION INTRAVENOUS ONCE
Status: COMPLETED | OUTPATIENT
Start: 2023-07-30 | End: 2023-07-30

## 2023-07-30 RX ORDER — POTASSIUM CHLORIDE 29.8 MG/ML
40 INJECTION INTRAVENOUS 2 TIMES DAILY
Status: DISCONTINUED | OUTPATIENT
Start: 2023-07-30 | End: 2023-07-30

## 2023-07-30 RX ORDER — DEXTROSE MONOHYDRATE, SODIUM CHLORIDE, AND POTASSIUM CHLORIDE 50; 2.98; 4.5 G/1000ML; G/1000ML; G/1000ML
100 INJECTION, SOLUTION INTRAVENOUS CONTINUOUS
Status: DISCONTINUED | OUTPATIENT
Start: 2023-07-30 | End: 2023-07-31

## 2023-07-30 RX ADMIN — LEVOFLOXACIN 750 MG: 750 INJECTION, SOLUTION INTRAVENOUS at 18:11

## 2023-07-30 RX ADMIN — METRONIDAZOLE 500 MG: 500 INJECTION, SOLUTION INTRAVENOUS at 00:38

## 2023-07-30 RX ADMIN — DEXTROSE, SODIUM CHLORIDE, AND POTASSIUM CHLORIDE 100 ML/HR: 5; .45; .3 INJECTION INTRAVENOUS at 08:48

## 2023-07-30 RX ADMIN — POTASSIUM CHLORIDE 20 MEQ: 14.9 INJECTION, SOLUTION INTRAVENOUS at 08:54

## 2023-07-30 RX ADMIN — DEXTROSE, SODIUM CHLORIDE, AND POTASSIUM CHLORIDE 100 ML/HR: 5; .45; .3 INJECTION INTRAVENOUS at 18:12

## 2023-07-30 RX ADMIN — DIBASIC SODIUM PHOSPHATE, MONOBASIC POTASSIUM PHOSPHATE AND MONOBASIC SODIUM PHOSPHATE 1 TABLET: 852; 155; 130 TABLET ORAL at 16:26

## 2023-07-30 RX ADMIN — DIBASIC SODIUM PHOSPHATE, MONOBASIC POTASSIUM PHOSPHATE AND MONOBASIC SODIUM PHOSPHATE 1 TABLET: 852; 155; 130 TABLET ORAL at 08:14

## 2023-07-30 RX ADMIN — METRONIDAZOLE 500 MG: 500 INJECTION, SOLUTION INTRAVENOUS at 16:28

## 2023-07-30 RX ADMIN — KETOROLAC TROMETHAMINE 15 MG: 30 INJECTION, SOLUTION INTRAMUSCULAR at 21:46

## 2023-07-30 RX ADMIN — OXYCODONE HYDROCHLORIDE 5 MG: 5 TABLET ORAL at 19:53

## 2023-07-30 RX ADMIN — HYDROMORPHONE HYDROCHLORIDE 0.2 MG: 0.2 INJECTION, SOLUTION INTRAMUSCULAR; INTRAVENOUS; SUBCUTANEOUS at 11:53

## 2023-07-30 RX ADMIN — MAGNESIUM SULFATE HEPTAHYDRATE 2 G: 40 INJECTION, SOLUTION INTRAVENOUS at 14:24

## 2023-07-30 RX ADMIN — KETOROLAC TROMETHAMINE 15 MG: 30 INJECTION, SOLUTION INTRAMUSCULAR at 09:01

## 2023-07-30 RX ADMIN — HYDROMORPHONE HYDROCHLORIDE 0.5 MG: 1 INJECTION, SOLUTION INTRAMUSCULAR; INTRAVENOUS; SUBCUTANEOUS at 05:49

## 2023-07-30 RX ADMIN — HYDROMORPHONE HYDROCHLORIDE 0.2 MG: 0.2 INJECTION, SOLUTION INTRAMUSCULAR; INTRAVENOUS; SUBCUTANEOUS at 17:45

## 2023-07-30 RX ADMIN — ENOXAPARIN SODIUM 40 MG: 40 INJECTION SUBCUTANEOUS at 08:14

## 2023-07-30 RX ADMIN — METRONIDAZOLE 500 MG: 500 INJECTION, SOLUTION INTRAVENOUS at 08:12

## 2023-07-30 RX ADMIN — ALPRAZOLAM 1 MG: 0.5 TABLET ORAL at 22:32

## 2023-07-30 RX ADMIN — UMECLIDINIUM BROMIDE AND VILANTEROL TRIFENATATE 1 PUFF: 62.5; 25 POWDER RESPIRATORY (INHALATION) at 08:18

## 2023-07-30 RX ADMIN — DIBASIC SODIUM PHOSPHATE, MONOBASIC POTASSIUM PHOSPHATE AND MONOBASIC SODIUM PHOSPHATE 1 TABLET: 852; 155; 130 TABLET ORAL at 21:46

## 2023-07-30 RX ADMIN — DIBASIC SODIUM PHOSPHATE, MONOBASIC POTASSIUM PHOSPHATE AND MONOBASIC SODIUM PHOSPHATE 1 TABLET: 852; 155; 130 TABLET ORAL at 11:15

## 2023-07-30 RX ADMIN — POTASSIUM CHLORIDE 20 MEQ: 14.9 INJECTION, SOLUTION INTRAVENOUS at 11:11

## 2023-07-30 NOTE — PLAN OF CARE
Problem: PAIN - ADULT  Goal: Verbalizes/displays adequate comfort level or baseline comfort level  Description: Interventions:  - Encourage patient to monitor pain and request assistance  - Assess pain using appropriate pain scale  - Administer analgesics based on type and severity of pain and evaluate response  - Implement non-pharmacological measures as appropriate and evaluate response  - Consider cultural and social influences on pain and pain management  - Notify physician/advanced practitioner if interventions unsuccessful or patient reports new pain  Outcome: Progressing     Problem: INFECTION - ADULT  Goal: Absence or prevention of progression during hospitalization  Description: INTERVENTIONS:  - Assess and monitor for signs and symptoms of infection  - Monitor lab/diagnostic results  - Monitor all insertion sites, i.e. indwelling lines, tubes, and drains  - Monitor endotracheal if appropriate and nasal secretions for changes in amount and color  - [place] appropriate cooling/warming therapies per order  - Administer medications as ordered  - Instruct and encourage patient and family to use good hand hygiene technique  - Identify and instruct in appropriate isolation precautions for identified infection/condition  Outcome: Progressing     Problem: GASTROINTESTINAL - ADULT  Goal: Minimal or absence of nausea and/or vomiting  Description: INTERVENTIONS:  - Administer IV fluids if ordered to ensure adequate hydration  - Maintain NPO status until nausea and vomiting are resolved  - Nasogastric tube if ordered  - Administer ordered antiemetic medications as needed  - Provide nonpharmacologic comfort measures as appropriate  - Advance diet as tolerated, if ordered  - Consider nutrition services referral to assist patient with adequate nutrition and appropriate food choices  Outcome: Progressing

## 2023-07-30 NOTE — PLAN OF CARE
Problem: PAIN - ADULT  Goal: Verbalizes/displays adequate comfort level or baseline comfort level  Description: Interventions:  - Encourage patient to monitor pain and request assistance  - Assess pain using appropriate pain scale  - Administer analgesics based on type and severity of pain and evaluate response  - Implement non-pharmacological measures as appropriate and evaluate response  - Consider cultural and social influences on pain and pain management  - Notify physician/advanced practitioner if interventions unsuccessful or patient reports new pain  Outcome: Progressing     Problem: INFECTION - ADULT  Goal: Absence or prevention of progression during hospitalization  Description: INTERVENTIONS:  - Assess and monitor for signs and symptoms of infection  - Monitor lab/diagnostic results  - Monitor all insertion sites, i.e. indwelling lines, tubes, and drains  - Monitor endotracheal if appropriate and nasal secretions for changes in amount and color  - [place] appropriate cooling/warming therapies per order  - Administer medications as ordered  - Instruct and encourage patient and family to use good hand hygiene technique  - Identify and instruct in appropriate isolation precautions for identified infection/condition  Outcome: Progressing  Goal: Absence of fever/infection during neutropenic period  Description: INTERVENTIONS:  - Monitor WBC    Outcome: Progressing     Problem: GASTROINTESTINAL - ADULT  Goal: Minimal or absence of nausea and/or vomiting  Description: INTERVENTIONS:  - Administer IV fluids if ordered to ensure adequate hydration  - Maintain NPO status until nausea and vomiting are resolved  - Nasogastric tube if ordered  - Administer ordered antiemetic medications as needed  - Provide nonpharmacologic comfort measures as appropriate  - Advance diet as tolerated, if ordered  - Consider nutrition services referral to assist patient with adequate nutrition and appropriate food choices  Outcome: Progressing  Goal: Maintains or returns to baseline bowel function  Description: INTERVENTIONS:  - Assess bowel function  - Encourage oral fluids to ensure adequate hydration  - Administer IV fluids if ordered to ensure adequate hydration  - Administer ordered medications as needed  - Encourage mobilization and activity  - Consider nutritional services referral to assist patient with adequate nutrition and appropriate food choices  Outcome: Progressing  Goal: Maintains adequate nutritional intake  Description: INTERVENTIONS:  - Monitor percentage of each meal consumed  - Identify factors contributing to decreased intake, treat as appropriate  - Assist with meals as needed  - Monitor I&O, weight, and lab values if indicated  - Obtain nutrition services referral as needed  Outcome: Progressing

## 2023-07-30 NOTE — UTILIZATION REVIEW
NOTIFICATION OF INPATIENT ADMISSION   AUTHORIZATION REQUEST   SERVICING FACILITY:   [address] [place] [address]  Tax ID: [id number]  NPI: [id number] ATTENDING PROVIDER:  Attending Name and NPI#: Cachorro Macias Md [[id number]]  Address: [address] [place] [address]  Phone: [phone]   ADMISSION INFORMATION:  [facility]   Place of Service Code: 21  Inpatient Admission Date/Time: 7/28/23  1:51 PM  Discharge Date/Time: No discharge date for patient encounter. Admitting Diagnosis Code/Description:  Acute cholecystitis [K81.0]  Abdominal pain [R10.9]     UTILIZATION REVIEW CONTACT:  Yessy Isidro Utilization   Network Utilization Review Department  Phone: [phone]  Fax [phone]  Email: [email or web address]  Contact for approvals/pending authorizations, clinical reviews, and discharge. PHYSICIAN ADVISORY SERVICES:  Medical Necessity Denial & [place] Review  Phone: [phone]  Fax: [phone]  Email: [email or web address]

## 2023-07-30 NOTE — PROGRESS NOTES
Progress Note - General Surgery   Maddie Garrison 41 y.o. male MRN: [id number]  Unit/Bed#: [address] [place] [address] Encounter: [id number]    Assessment:  1) acute cholecystitis status post percutaneous cholecystostomy tube placement -able, AVSS, hypokalemia 2.4, cholecystostomy tube only outputting 110 mL of dark bilious secretion, still having persistent pain in the upper right quadrant, tolerating clears but no robust appetite and is not consuming much, no vomiting but mild nausea, passing gas and urinating routinely, magnesium also slightly low at 1.7, persisting mild leukocytosis    Plan:  1)   -Repleted magnesium  -Repleted IV potassium  -Placed on oral potassium in addition to IV repletion efforts  -Placed on D5 half-normal saline with 40 mill equivalent potassium 100 mL/h  -Continue I/O  -Continue cholecystostomy tube  -Continue as needed analgesia  -Discontinue Dilaudid for breakthrough  -Advance to full liquids  -Repeat labs  -As needed Zofran  -Continue DVT prophylaxis  -Continue IV levofloxacin and Flagyl      Subjective/Objective   Chief Complaint: Still having a lot of pain    Subjective: Patient was seen and examined at bedside. Patient denies any acute events overnight. Patient patient reports that he is still having persisting pain in the upper quadrant and epigastric regions. Patient denies any sort of improvement in such pain. Patient denies any vomiting. Patient has not been eating the entirety of his clears but does not feel nauseated when drinking them. Patient has not been up and ambulating as a result of discomfort that it causes of his abdomen. Patient has been passing gas and urinating. Objective:     Blood pressure 114/77, pulse 102, temperature 98.1 °F (36.7 °C), resp. rate 18, SpO2 90 %. ,There is no height or weight on file to calculate BMI.       Intake/Output Summary (Last 24 hours) at 7/30/2023 1401  Last data filed at 7/30/2023 0900  Gross per 24 hour   Intake 160 ml   Output 110 ml Net 50 ml       Invasive Devices     Peripheral Intravenous Line  Duration           Peripheral IV 07/28/23 Right Antecubital 2 days    Peripheral IV 07/28/23 Left Antecubital 1 day          Drain  Duration           Cholecystostomy Tube 1 day                Physical Exam: /77 (BP Location: Right arm)   Pulse 102   Temp 98.1 °F (36.7 °C)   Resp 18   SpO2 90%   General appearance: alert and oriented, in no acute distress  Head: Normocephalic, without obvious abnormality, atraumatic  Lungs: clear to auscultation bilaterally  Heart: regular rate and rhythm, S1, S2 normal, no murmur, click, rub or gallop  Abdomen: Soft, mild guarding, tenderness in the upper right quadrant and epigastric region, active bowel sounds  Skin: Skin color, texture, turgor normal. No rashes or lesions or Cholecystostomy tube in position    Lab, Imaging and other studies:  I have personally reviewed pertinent lab results.   , CBC:   Lab Results   Component Value Date    WBC 13.85 (H) 07/30/2023    HGB 11.7 (L) 07/30/2023    HCT 34.3 (L) 07/30/2023    MCV 84 07/30/2023     07/30/2023    RBC 4.10 07/30/2023    MCH 28.5 07/30/2023    MCHC 34.1 07/30/2023    RDW 14.3 07/30/2023    MPV 10.0 07/30/2023    NRBC 0 07/30/2023   , CMP:   Lab Results   Component Value Date    SODIUM 142 07/30/2023    K 2.4 (LL) 07/30/2023    CL 96 07/30/2023    CO2 39 (H) 07/30/2023    BUN 6 07/30/2023    CREATININE 0.74 07/30/2023    CALCIUM 8.7 07/30/2023    AST 12 (L) 07/30/2023    ALT 6 (L) 07/30/2023    ALKPHOS 84 07/30/2023    EGFR 111 07/30/2023     VTE Pharmacologic Prophylaxis: Enoxaparin (Lovenox)  VTE Mechanical Prophylaxis: sequential compression device

## 2023-07-31 LAB
ANION GAP SERPL CALCULATED.3IONS-SCNC: 4 MMOL/L
BACTERIA SPEC BFLD CULT: ABNORMAL
BASOPHILS # BLD AUTO: 0.03 THOUSANDS/ÂΜL (ref 0–0.1)
BASOPHILS NFR BLD AUTO: 0 % (ref 0–1)
BUN SERPL-MCNC: 3 MG/DL (ref 5–25)
CALCIUM SERPL-MCNC: 8.3 MG/DL (ref 8.4–10.2)
CHLORIDE SERPL-SCNC: 99 MMOL/L (ref 96–108)
CO2 SERPL-SCNC: 36 MMOL/L (ref 21–32)
CREAT SERPL-MCNC: 0.59 MG/DL (ref 0.6–1.3)
EOSINOPHIL # BLD AUTO: 0.02 THOUSAND/ÂΜL (ref 0–0.61)
EOSINOPHIL NFR BLD AUTO: 0 % (ref 0–6)
ERYTHROCYTE [DISTWIDTH] IN BLOOD BY AUTOMATED COUNT: 14.3 % (ref 11.6–15.1)
GFR SERPL CREATININE-BSD FRML MDRD: 122 ML/MIN/1.73SQ M
GLUCOSE SERPL-MCNC: 109 MG/DL (ref 65–140)
GRAM STN SPEC: ABNORMAL
GRAM STN SPEC: ABNORMAL
HCT VFR BLD AUTO: 31.4 % (ref 36.5–49.3)
HGB BLD-MCNC: 10.6 G/DL (ref 12–17)
IMM GRANULOCYTES # BLD AUTO: 0.05 THOUSAND/UL (ref 0–0.2)
IMM GRANULOCYTES NFR BLD AUTO: 1 % (ref 0–2)
LYMPHOCYTES # BLD AUTO: 1.96 THOUSANDS/ÂΜL (ref 0.6–4.47)
LYMPHOCYTES NFR BLD AUTO: 19 % (ref 14–44)
MAGNESIUM SERPL-MCNC: 1.9 MG/DL (ref 1.9–2.7)
MCH RBC QN AUTO: 28 PG (ref 26.8–34.3)
MCHC RBC AUTO-ENTMCNC: 33.8 G/DL (ref 31.4–37.4)
MCV RBC AUTO: 83 FL (ref 82–98)
MONOCYTES # BLD AUTO: 0.56 THOUSAND/ÂΜL (ref 0.17–1.22)
MONOCYTES NFR BLD AUTO: 5 % (ref 4–12)
NEUTROPHILS # BLD AUTO: 7.94 THOUSANDS/ÂΜL (ref 1.85–7.62)
NEUTS SEG NFR BLD AUTO: 75 % (ref 43–75)
NRBC BLD AUTO-RTO: 0 /100 WBCS
PLATELET # BLD AUTO: 191 THOUSANDS/UL (ref 149–390)
PMV BLD AUTO: 9.7 FL (ref 8.9–12.7)
POTASSIUM SERPL-SCNC: 2.7 MMOL/L (ref 3.5–5.3)
RBC # BLD AUTO: 3.78 MILLION/UL (ref 3.88–5.62)
SODIUM SERPL-SCNC: 139 MMOL/L (ref 135–147)
WBC # BLD AUTO: 10.56 THOUSAND/UL (ref 4.31–10.16)

## 2023-07-31 PROCEDURE — 99232 SBSQ HOSP IP/OBS MODERATE 35: CPT | Performed by: PHYSICIAN ASSISTANT

## 2023-07-31 PROCEDURE — 83735 ASSAY OF MAGNESIUM: CPT | Performed by: PHYSICIAN ASSISTANT

## 2023-07-31 PROCEDURE — 80048 BASIC METABOLIC PNL TOTAL CA: CPT | Performed by: PHYSICIAN ASSISTANT

## 2023-07-31 PROCEDURE — 85025 COMPLETE CBC W/AUTO DIFF WBC: CPT | Performed by: PHYSICIAN ASSISTANT

## 2023-07-31 RX ORDER — DEXTROSE MONOHYDRATE, SODIUM CHLORIDE, AND POTASSIUM CHLORIDE 50; 2.98; 4.5 G/1000ML; G/1000ML; G/1000ML
50 INJECTION, SOLUTION INTRAVENOUS CONTINUOUS
Status: DISCONTINUED | OUTPATIENT
Start: 2023-07-31 | End: 2023-08-01

## 2023-07-31 RX ORDER — MAGNESIUM SULFATE 1 G/100ML
1 INJECTION INTRAVENOUS ONCE
Status: COMPLETED | OUTPATIENT
Start: 2023-07-31 | End: 2023-08-01

## 2023-07-31 RX ORDER — POTASSIUM CHLORIDE 14.9 MG/ML
20 INJECTION INTRAVENOUS ONCE
Status: COMPLETED | OUTPATIENT
Start: 2023-07-31 | End: 2023-08-01

## 2023-07-31 RX ORDER — OXYCODONE HYDROCHLORIDE 5 MG/1
5 TABLET ORAL EVERY 4 HOURS PRN
Status: DISCONTINUED | OUTPATIENT
Start: 2023-07-31 | End: 2023-08-01 | Stop reason: HOSPADM

## 2023-07-31 RX ORDER — KETOROLAC TROMETHAMINE 30 MG/ML
15 INJECTION, SOLUTION INTRAMUSCULAR; INTRAVENOUS EVERY 6 HOURS SCHEDULED
Status: DISCONTINUED | OUTPATIENT
Start: 2023-07-31 | End: 2023-08-01 | Stop reason: HOSPADM

## 2023-07-31 RX ADMIN — DIBASIC SODIUM PHOSPHATE, MONOBASIC POTASSIUM PHOSPHATE AND MONOBASIC SODIUM PHOSPHATE 1 TABLET: 852; 155; 130 TABLET ORAL at 21:30

## 2023-07-31 RX ADMIN — HYDROMORPHONE HYDROCHLORIDE 0.2 MG: 0.2 INJECTION, SOLUTION INTRAMUSCULAR; INTRAVENOUS; SUBCUTANEOUS at 03:28

## 2023-07-31 RX ADMIN — METRONIDAZOLE 500 MG: 500 INJECTION, SOLUTION INTRAVENOUS at 23:32

## 2023-07-31 RX ADMIN — KETOROLAC TROMETHAMINE 15 MG: 30 INJECTION, SOLUTION INTRAMUSCULAR at 17:42

## 2023-07-31 RX ADMIN — KETOROLAC TROMETHAMINE 15 MG: 30 INJECTION, SOLUTION INTRAMUSCULAR at 23:31

## 2023-07-31 RX ADMIN — MAGNESIUM SULFATE HEPTAHYDRATE 1 G: 1 INJECTION, SOLUTION INTRAVENOUS at 09:57

## 2023-07-31 RX ADMIN — ENOXAPARIN SODIUM 40 MG: 40 INJECTION SUBCUTANEOUS at 09:13

## 2023-07-31 RX ADMIN — DEXTROSE, SODIUM CHLORIDE, AND POTASSIUM CHLORIDE 50 ML/HR: 5; .45; .3 INJECTION INTRAVENOUS at 12:17

## 2023-07-31 RX ADMIN — OXYCODONE HYDROCHLORIDE 5 MG: 5 TABLET ORAL at 07:49

## 2023-07-31 RX ADMIN — METRONIDAZOLE 500 MG: 500 INJECTION, SOLUTION INTRAVENOUS at 16:22

## 2023-07-31 RX ADMIN — UMECLIDINIUM BROMIDE AND VILANTEROL TRIFENATATE 1 PUFF: 62.5; 25 POWDER RESPIRATORY (INHALATION) at 09:12

## 2023-07-31 RX ADMIN — METRONIDAZOLE 500 MG: 500 INJECTION, SOLUTION INTRAVENOUS at 00:14

## 2023-07-31 RX ADMIN — KETOROLAC TROMETHAMINE 15 MG: 30 INJECTION, SOLUTION INTRAMUSCULAR at 12:11

## 2023-07-31 RX ADMIN — DIBASIC SODIUM PHOSPHATE, MONOBASIC POTASSIUM PHOSPHATE AND MONOBASIC SODIUM PHOSPHATE 1 TABLET: 852; 155; 130 TABLET ORAL at 07:29

## 2023-07-31 RX ADMIN — OXYCODONE HYDROCHLORIDE 5 MG: 5 TABLET ORAL at 21:30

## 2023-07-31 RX ADMIN — LEVOFLOXACIN 750 MG: 750 INJECTION, SOLUTION INTRAVENOUS at 17:37

## 2023-07-31 RX ADMIN — DEXTROSE, SODIUM CHLORIDE, AND POTASSIUM CHLORIDE 50 ML/HR: 5; .45; .3 INJECTION INTRAVENOUS at 09:32

## 2023-07-31 RX ADMIN — POTASSIUM CHLORIDE 20 MEQ: 14.9 INJECTION, SOLUTION INTRAVENOUS at 09:31

## 2023-07-31 RX ADMIN — DEXTROSE, SODIUM CHLORIDE, AND POTASSIUM CHLORIDE 100 ML/HR: 5; .45; .3 INJECTION INTRAVENOUS at 02:15

## 2023-07-31 RX ADMIN — DIBASIC SODIUM PHOSPHATE, MONOBASIC POTASSIUM PHOSPHATE AND MONOBASIC SODIUM PHOSPHATE 1 TABLET: 852; 155; 130 TABLET ORAL at 12:15

## 2023-07-31 RX ADMIN — POTASSIUM CHLORIDE 20 MEQ: 14.9 INJECTION, SOLUTION INTRAVENOUS at 07:31

## 2023-07-31 RX ADMIN — METRONIDAZOLE 500 MG: 500 INJECTION, SOLUTION INTRAVENOUS at 07:41

## 2023-07-31 RX ADMIN — DIBASIC SODIUM PHOSPHATE, MONOBASIC POTASSIUM PHOSPHATE AND MONOBASIC SODIUM PHOSPHATE 1 TABLET: 852; 155; 130 TABLET ORAL at 16:20

## 2023-07-31 NOTE — PROGRESS NOTES
Progress Note - General Surgery   Maldonado Paget 41 y.o. male MRN: [id number]  Unit/Bed#: [address] [place] [address] Encounter: [id number]    Assessment:  1) acute cholecystitis status post percutaneous cholecystostomy tube placement - AVSS, cholecystostomy tube outputting dark bilious secretion, abdominal pain improving, WBC count improving 13.8 > 10.5  2) hypokalemia       Plan:  1-2)   -Continued efforts at repleting magnesium and potassium  -Decrease IV fluid rate (D5 half-normal saline with 40 mill equivalent potassium 100 mL/h)  -Continue I/O  -Will go home with cholecystostomy tube with IR follow up   -Continue as needed analgesia  -Advance to regular diet   -AM labs: BMP, CBC  -As needed Zofran  -Continue DVT prophylaxis  -Continue IV levofloxacin and Flagyl  -discharge hopefully tomorrow 8/1      Subjective/Objective     Subjective:  Patient reports he's feeling much better. He states his abdominal pain is improving. He still doesn't have much of an appetite. He is asking when he can return home. Objective:     Blood pressure 127/84, pulse 88, temperature 98.2 °F (36.8 °C), resp. rate 16, SpO2 96 %. ,There is no height or weight on file to calculate BMI.       Intake/Output Summary (Last 24 hours) at 7/31/2023 1379  Last data filed at 7/31/2023 0701  Gross per 24 hour   Intake 2145 ml   Output 135 ml   Net 2010 ml       Invasive Devices     Peripheral Intravenous Line  Duration           Peripheral IV 07/28/23 Left Antecubital 2 days    Peripheral IV 07/28/23 Right Antecubital 2 days          Drain  Duration           Cholecystostomy Tube 2 days                Physical Exam: /84   Pulse 88   Temp 98.2 °F (36.8 °C)   Resp 16   SpO2 96%   General appearance: alert and oriented, in no acute distress  Head: Normocephalic, without obvious abnormality, atraumatic  Lungs: clear to auscultation bilaterally  Heart: regular rate and rhythm, S1, S2 normal, no murmur, click, rub or gallop  Abdomen: Soft, non distended, minimal tenderness in the upper right quadrant and epigastric region, active bowel sounds  Skin: Skin color, texture, turgor normal. No rashes or lesions or Cholecystostomy tube in position    Lab, Imaging and other studies:  I have personally reviewed pertinent lab results.   , CBC:   Lab Results   Component Value Date    WBC 10.56 (H) 07/31/2023    HGB 10.6 (L) 07/31/2023    HCT 31.4 (L) 07/31/2023    MCV 83 07/31/2023     07/31/2023    RBC 3.78 (L) 07/31/2023    MCH 28.0 07/31/2023    MCHC 33.8 07/31/2023    RDW 14.3 07/31/2023    MPV 9.7 07/31/2023    NRBC 0 07/31/2023   , CMP:   Lab Results   Component Value Date    SODIUM 139 07/31/2023    K 2.7 (LL) 07/31/2023    CL 99 07/31/2023    CO2 36 (H) 07/31/2023    BUN 3 (L) 07/31/2023    CREATININE 0.59 (L) 07/31/2023    CALCIUM 8.3 (L) 07/31/2023    EGFR 122 07/31/2023     VTE Pharmacologic Prophylaxis: Enoxaparin (Lovenox) SQ  VTE Mechanical Prophylaxis: sequential compression device

## 2023-07-31 NOTE — PLAN OF CARE
Problem: PAIN - ADULT  Goal: Verbalizes/displays adequate comfort level or baseline comfort level  Description: Interventions:  - Encourage patient to monitor pain and request assistance  - Assess pain using appropriate pain scale  - Administer analgesics based on type and severity of pain and evaluate response  - Implement non-pharmacological measures as appropriate and evaluate response  - Consider cultural and social influences on pain and pain management  - Notify physician/advanced practitioner if interventions unsuccessful or patient reports new pain  Outcome: Progressing     Problem: INFECTION - ADULT  Goal: Absence or prevention of progression during hospitalization  Description: INTERVENTIONS:  - Assess and monitor for signs and symptoms of infection  - Monitor lab/diagnostic results  - Monitor all insertion sites, i.e. indwelling lines, tubes, and drains  - Monitor endotracheal if appropriate and nasal secretions for changes in amount and color  - [place] appropriate cooling/warming therapies per order  - Administer medications as ordered  - Instruct and encourage patient and family to use good hand hygiene technique  - Identify and instruct in appropriate isolation precautions for identified infection/condition  Outcome: Progressing     Problem: GASTROINTESTINAL - ADULT  Goal: Minimal or absence of nausea and/or vomiting  Description: INTERVENTIONS:  - Administer IV fluids if ordered to ensure adequate hydration  - Maintain NPO status until nausea and vomiting are resolved  - Nasogastric tube if ordered  - Administer ordered antiemetic medications as needed  - Provide nonpharmacologic comfort measures as appropriate  - Advance diet as tolerated, if ordered  - Consider nutrition services referral to assist patient with adequate nutrition and appropriate food choices  Outcome: Progressing

## 2023-07-31 NOTE — UTILIZATION REVIEW
Continued Stay Review    Date: 7/31/23                          Current Patient Class: inpatient  Current Level of Care: med surg  HPI:47 y.o. male initially admitted on 7/31/23     Assessment/Plan:   Acute cholecystitis status post percutaneous cholecystostomy tube placement. Abdomen: Soft, non distended, minimal tenderness in the upper right quadrant and epigastric region, active bowel sounds. Cholecystostomy tube outputting dark bilious fluid. Using IV Dilaudid & Oxycodone for pain control. Double IVABT in progress. IV K repletion continued. Trial advancing diet, IVF maintained.      Vital Signs:   Date/Time Temp Pulse Resp BP MAP (mmHg) SpO2 Calculated FIO2 (%) - Nasal Cannula Nasal Cannula O2 Flow Rate (L/min) O2 Device Patient Position - Orthostatic VS   07/31/23 0900 -- -- -- -- -- -- 28 2 L/min Nasal cannula --   07/31/23 07:29:24 98.2 °F (36.8 °C) 88 -- 127/84 98 96 % -- -- -- --     Pertinent Labs/Diagnostic Results:     Results from last 7 days   Lab Units 07/31/23  0602 07/30/23  0500 07/29/23  0607 07/28/23  1034   WBC Thousand/uL 10.56* 13.85* 14.41* 11.65*   HEMOGLOBIN g/dL 10.6* 11.7* 12.3 14.1   HEMATOCRIT % 31.4* 34.3* 36.9 41.1   PLATELETS Thousands/uL 191 214 293 328   NEUTROS ABS Thousands/µL 7.94* 11.70* 12.78* 7.85*     Results from last 7 days   Lab Units 07/31/23  0602 07/30/23  0500 07/29/23  0607 07/28/23  1034   SODIUM mmol/L 139 142 139 140   POTASSIUM mmol/L 2.7* 2.4* 3.2* 2.3*   CHLORIDE mmol/L 99 96 94* 89*   CO2 mmol/L 36* 39* 36* 39*   ANION GAP mmol/L 4 7 9 12   BUN mg/dL 3* 6 8 3*   CREATININE mg/dL 0.59* 0.74 0.90 1.00   EGFR ml/min/1.73sq m 122 111 102 90   CALCIUM mg/dL 8.3* 8.7 9.2 9.3   MAGNESIUM mg/dL 1.9 1.7* 1.9 1.7*   PHOSPHORUS mg/dL  --  2.7 2.8  --      Results from last 7 days   Lab Units 07/30/23  0500 07/29/23  0607 07/28/23  1034   AST U/L 12* 17 12*   ALT U/L 6* 10 6*   ALK PHOS U/L 84 107* 93   TOTAL PROTEIN g/dL 5.6* 6.2* 6.8   ALBUMIN g/dL 2.9* 3.3* 3.6   TOTAL BILIRUBIN mg/dL 0.34 0.47 0.48     Results from last 7 days   Lab Units 07/31/23  0602 07/30/23  0500 07/29/23  0607 07/28/23  1034   GLUCOSE RANDOM mg/dL 109 104 141* 125     Results from last 7 days   Lab Units 07/28/23  1521   PROTIME seconds 13.3   INR  1.00   PTT seconds 31     Results from last 7 days   Lab Units 07/28/23  1034   LIPASE u/L 23     Results from last 7 days   Lab Units 07/30/23  2235   CLARITY UA  Clear   COLOR UA  Light Yellow   SPEC GRAV UA  1.010   PH UA  7.5   GLUCOSE UA mg/dl Negative   KETONES UA mg/dl Negative   BLOOD UA  Negative   PROTEIN UA mg/dl Negative   NITRITE UA  Negative   BILIRUBIN UA  Negative   UROBILINOGEN UA E.U./dl 0.2   LEUKOCYTES UA  Negative     Results from last 7 days   Lab Units 07/28/23  1707   GRAM STAIN RESULT  3+ Polys  No bacteria seen   BODY FLUID CULTURE, STERILE  2+ Growth of Streptococcus mitis oralis group*     Medications:   Scheduled Medications:  enoxaparin, 40 mg, Subcutaneous, Daily  levofloxacin, 750 mg, Intravenous, Q24H  metroNIDAZOLE, 500 mg, Intravenous, Q8H  nicotine, 1 patch, Transdermal, Daily  potassium chloride, 20 mEq, Intravenous, Once   Followed by  potassium chloride, 20 mEq, Intravenous, Once  potassium-sodium phosphateS, 1 tablet, Oral, 4x Daily (with meals and at bedtime)  umeclidinium-vilanterol, 1 puff, Inhalation, Daily    Continuous IV Infusions:  dextrose 5 % and sodium chloride 0.45 % with KCl 40 mEq/L, 100 mL/hr, Intravenous, Continuous    PRN Meds:  acetaminophen, 650 mg, Oral, Q6H PRN  albuterol, 1 puff, Inhalation, Q6H PRN  ALPRAZolam, 1 mg, Oral, TID PRN  calcium carbonate, 500 mg, Oral, Daily PRN  HYDROmorphone, 0.2 mg, Intravenous, Q4H PRN, 7/31 x1  metoprolol, 2.5 mg, Intravenous, Q6H PRN  ondansetron, 4 mg, Intravenous, Q6H PRN  oxyCODONE, 5 mg, Oral, Q4H PRN, 7/31 x1  saliva substitute, 5 spray, Mouth/Throat, 4x Daily PRN  simethicone, 80 mg, Oral, Q6H PRN    Discharge Plan: tbd    Network Utilization Review Department  ATTENTION: Please call with any questions or concerns to [phone] and carefully listen to the prompts so that you are directed to the right person. All voicemails are confidential.  Aidan Hunter all requests for admission clinical reviews, approved or denied determinations and any other requests to dedicated fax number below belonging to the campus where the patient is receiving treatment.  List of dedicated fax numbers for the Facilities:  [place] DENIALS (Administrative/Medical Necessity) [phone]   [address] [facility] (Maternity/NICU/Pediatrics) [phone]   [address] [place] [address] [phone]   [facility] [address] [facility] [phone]   [address] [place] [address] [address] [phone]   [address] [address] [phone]

## 2023-07-31 NOTE — UTILIZATION REVIEW
Continued Stay Review    Date: 7/30/23                          Current Patient Class: inpatient  Current Level of Care: med surg  HPI:47 y.o. male initially admitted on 7/28/23     Assessment/Plan:   PPD #2: status post percutaneous cholecystostomy tube placement   Abdomen: Soft, mild guarding, tenderness in the upper right quadrant and epigastric region, active bowel sounds. Cholecystostomy tube only outputting 110 mL of dark bilious secretion, still having persistent pain in the upper right quadrant, requiring IV Dilaudid. Trial advancing diet to full liquids, IVF maintained.  IV Mg, K  repletion given    Vital Signs:   07/30/23 22:44:16 97.8 °F (36.6 °C) 94 16 108/75 86 94 % 28 2 L/min Nasal cannula Lying   07/30/23 2100 -- 97 -- -- -- -- -- -- -- --   07/30/23 1953 -- -- 18 118/78 91 94 % 28 2 L/min Nasal cannula Lying   07/30/23 19:41:31 98.2 °F (36.8 °C) 104 18 118/78 91 92 % 28 2 L/min Nasal cannula Lying   07/30/23 12:09:35 98.1 °F (36.7 °C) 102 -- -- -- 90 % -- -- -- --   07/30/23 07:30:57 99 °F (37.2 °C) 104 18 114/77 89 91 % 28 2 L/min Nasal cannula Lying       Pertinent Labs/Diagnostic Results:     Results from last 7 days   Lab Units 07/31/23  0602 07/30/23  0500 07/29/23  0607 07/28/23  1034   WBC Thousand/uL 10.56* 13.85* 14.41* 11.65*   HEMOGLOBIN g/dL 10.6* 11.7* 12.3 14.1   HEMATOCRIT % 31.4* 34.3* 36.9 41.1   PLATELETS Thousands/uL 191 214 293 328   NEUTROS ABS Thousands/µL 7.94* 11.70* 12.78* 7.85*     Results from last 7 days   Lab Units 07/31/23  0602 07/30/23  0500 07/29/23  0607 07/28/23  1034   SODIUM mmol/L 139 142 139 140   POTASSIUM mmol/L 2.7* 2.4* 3.2* 2.3*   CHLORIDE mmol/L 99 96 94* 89*   CO2 mmol/L 36* 39* 36* 39*   ANION GAP mmol/L 4 7 9 12   BUN mg/dL 3* 6 8 3*   CREATININE mg/dL 0.59* 0.74 0.90 1.00   EGFR ml/min/1.73sq m 122 111 102 90   CALCIUM mg/dL 8.3* 8.7 9.2 9.3   MAGNESIUM mg/dL 1.9 1.7* 1.9 1.7*   PHOSPHORUS mg/dL  --  2.7 2.8  --      Results from last 7 days   Lab Units 07/30/23  0500 07/29/23  0607 07/28/23  1034   AST U/L 12* 17 12*   ALT U/L 6* 10 6*   ALK PHOS U/L 84 107* 93   TOTAL PROTEIN g/dL 5.6* 6.2* 6.8   ALBUMIN g/dL 2.9* 3.3* 3.6   TOTAL BILIRUBIN mg/dL 0.34 0.47 0.48     Results from last 7 days   Lab Units 07/31/23  0602 07/30/23  0500 07/29/23  0607 07/28/23  1034   GLUCOSE RANDOM mg/dL 109 104 141* 125     Results from last 7 days   Lab Units 07/28/23  1521   PROTIME seconds 13.3   INR  1.00   PTT seconds 31     Results from last 7 days   Lab Units 07/28/23  1034   LIPASE u/L 23     Results from last 7 days   Lab Units 07/30/23  2235   CLARITY UA  Clear   COLOR UA  Light Yellow   SPEC GRAV UA  1.010   PH UA  7.5   GLUCOSE UA mg/dl Negative   KETONES UA mg/dl Negative   BLOOD UA  Negative   PROTEIN UA mg/dl Negative   NITRITE UA  Negative   BILIRUBIN UA  Negative   UROBILINOGEN UA E.U./dl 0.2   LEUKOCYTES UA  Negative     Results from last 7 days   Lab Units 07/28/23  1707   GRAM STAIN RESULT  3+ Polys  No bacteria seen   BODY FLUID CULTURE, STERILE  2+ Growth of Streptococcus mitis oralis group*     Medications:   Scheduled Medications:  Medications 07/22 07/23 07/24 07/25 07/26 07/27 07/28 07/29 07/30 07/31   albuterol inhalation solution 2.5 mg  Dose: 2.5 mg  Freq: Once Route: NEBULIZATION  Start: 07/28/23 1400 End: 07/28/23 1352          1352           diphenhydrAMINE (BENADRYL) injection 50 mg  Dose: 50 mg  Freq: Once Route: IV  Start: 07/28/23 1400 End: 07/28/23 1350   Admin Instructions: For I.V. administration, inject at a rate less than or equal to 25 mg/minute          1350           enoxaparin (LOVENOX) subcutaneous injection 40 mg  Dose: 40 mg  Freq: Daily Route: SC  Start: 07/29/23 0900   Admin Instructions:   High Alert Medication, Check for allergies to pork or pork derivatives/dietary restrictions before administration.  LOOK ALIKE SOUND ALIKE MED           0827      [id number]      0900        Famotidine (PF) (PEPCID) injection 40 mg  Dose: 40 mg  Freq: Once Route: IV  Start: 07/28/23 1400 End: 07/28/23 1354   Admin Instructions:   IV push doses: dilute with 0.9% sodium chloride to a total of 10 mL, and administer over 2 minutes          1352           HYDROmorphone (DILAUDID) injection 0.5 mg  Dose: 0.5 mg  Freq: Once Route: IV  Start: 07/28/23 1030 End: 07/28/23 1044   Admin Instructions:   High alert medication. LOOK ALIKE SOUND ALIKE MED          1044           HYDROmorphone (DILAUDID) injection 1 mg  Dose: 1 mg  Freq: Once Route: IV  Start: 07/28/23 1245 End: 07/28/23 1306   Admin Instructions:   High alert medication. LOOK ALIKE SOUND ALIKE MED          1306           ketorolac (TORADOL) injection 15 mg  Dose: 15 mg  Freq: 2 times daily Route: IV  Start: 07/29/23 1345 End: 07/30/23 2146   Admin Instructions:   LOOK ALIKE SOUND ALIKE MED           1402     2231      0901     2146         lactated ringers bolus 1,000 mL  Dose: 1,000 mL  Freq: Once Route: IV  Last Dose: Stopped (07/28/23 1249)  Start: 07/28/23 1030 End: 07/28/23 1249          1049     1249           levofloxacin (LEVAQUIN) IVPB (premix in dextrose) 750 mg 150 mL  Dose: 750 mg  Freq: Every 24 hours Route: IV  Last Dose: 750 mg (07/30/23 1811)  Start: 07/28/23 1830   Admin Instructions:   Do NOT coadminister with calcium- or magnesium-containing solutions. LOOK ALIKE SOUND ALIKE MED   Order specific questions:   Indication: intra-abdominal infection          7601      5435      3688      2790        magnesium sulfate 2 g/50 mL IVPB (premix) 2 g  Dose: 2 g  Freq: Once Route: IV  Last Dose: 2 g (07/30/23 1424)  Start: 07/30/23 1415 End: 07/30/23 1624   Admin Instructions:   High alert medication. 1424         magnesium sulfate 2 g/50 mL IVPB (premix) 2 g  Dose: 2 g  Freq: Once Route: IV  Last Dose: Stopped (07/28/23 1508)  Start: 07/28/23 1145 End: 07/28/23 1508   Admin Instructions:   High alert medication.           5700 6632 [C]     0278 6488           methylPREDNISolone sodium succinate (Solu-MEDROL) injection 125 mg  Dose: 125 mg  Freq: Once Route: IV  Start: 07/28/23 1400 End: 07/28/23 1353          1350           metroNIDAZOLE (FLAGYL) IVPB (premix) 500 mg 100 mL  Dose: 500 mg  Freq: Every 8 hours Route: IV  Last Dose: 500 mg (07/31/23 0741)  Start: 07/29/23 0000   Admin Instructions:   Avoid ethanol during therapy and for 3 days after discontinuation. Protect from light. Do NOT refrigerate. LOOK ALIKE SOUND ALIKE MED   Order specific questions:   Indication: intra-abdominal infection          2319      0827     26      0038     0812     1628      0014     0741     1600        nicotine (NICODERM CQ) 7 mg/24hr TD 24 hr patch 1 patch  Dose: 1 patch  Freq: Daily Route: TD  Start: 07/29/23 0900   Admin Instructions:   Apply a new patch every 24 hours to a clean, dry, hairless site on the upper arm or hip. Hazardous agent; use appropriate precautions for handling and disposal. **DISPOSE EMPTY PACKAGING AND LEFTOVER/UNUSED MEDICATION IN 8 GALLON BLACK CONTAINER**.           (9248)      (8757)      0939        ondansetron (ZOFRAN) injection 4 mg  Dose: 4 mg  Freq: Once Route: IV  Start: 07/28/23 1030 End: 07/28/23 1042   Admin Instructions:   Push over 2 minutes. 1042           piperacillin-tazobactam (ZOSYN) IVPB 4.5 g  Dose: 4.5 g  Freq: Once Route: IV  Last Dose: Stopped (07/28/23 1347)  Start: 07/28/23 1245 End: 07/28/23 1347   Order specific questions:   Indication: intra-abdominal infection          1306     1347 [C]            potassium chloride 20 mEq IVPB (premix)  Dose: 20 mEq  Freq: Once Route: IV  Last Dose: 20 mEq (07/31/23 0731)  Start: 07/31/23 0715   Admin Instructions:   Patient must be on telemetry if rate is greater than 10 meq/hour. Peripheral administration not to exceed 20 meq/hour. High alert medication             0731        Followed by  potassium chloride 20 mEq IVPB (premix)  Dose: 20 mEq  Freq:  Once Route: IV  Start: 07/31/23 0915   Admin Instructions: Patient must be on telemetry if rate is greater than 10 meq/hour. Peripheral administration not to exceed 20 meq/hour. High alert medication             0915         potassium chloride 20 mEq IVPB (premix)  Dose: 20 mEq  Freq: Once Route: IV  Last Dose: 20 mEq (07/30/23 0854)  Start: 07/30/23 0800 End: 07/30/23 1054   Admin Instructions:   Patient must be on telemetry if rate is greater than 10 meq/hour. Peripheral administration not to exceed 20 meq/hour. High alert medication            0854         Followed by  potassium chloride 20 mEq IVPB (premix)  Dose: 20 mEq  Freq: Once Route: IV  Last Dose: 20 mEq (07/30/23 1111)  Start: 07/30/23 1000 End: 07/30/23 1311   Admin Instructions:   Patient must be on telemetry if rate is greater than 10 meq/hour. Peripheral administration not to exceed 20 meq/hour. High alert medication            1111          potassium chloride 20 mEq IVPB (premix)  Dose: 20 mEq  Freq: Once Route: IV  Start: 07/29/23 1400 End: 07/30/23 0749   Admin Instructions:   Patient must be on telemetry if rate is greater than 10 meq/hour. Peripheral administration not to exceed 20 meq/hour. High alert medication                 0749-D/C'd       Followed by  potassium chloride 20 mEq IVPB (premix)  Dose: 20 mEq  Freq: Once Route: IV  Last Dose: 20 mEq (07/29/23 1655)  Start: 07/29/23 1600 End: 07/29/23 1855   Admin Instructions:   Patient must be on telemetry if rate is greater than 10 meq/hour. Peripheral administration not to exceed 20 meq/hour. High alert medication           1655           potassium chloride 20 mEq IVPB (premix)  Dose: 20 mEq  Freq: Once Route: IV  Last Dose: 20 mEq (07/28/23 1505)  Start: 07/28/23 1345 End: 07/28/23 1705   Admin Instructions: Total conway 40mEq  Patient must be on telemetry if rate is greater than 10 meq/hour. Peripheral administration not to exceed 20 meq/hour.  High alert medication          1505           Followed by  potassium chloride 20 mEq IVPB (premix)  Dose: 20 mEq  Freq: Once Route: IV  Last Dose: 20 mEq (07/29/23 1402)  Start: 07/28/23 1545 End: 07/29/23 1602   Admin Instructions: Total [place] 40mEq  Patient must be on telemetry if rate is greater than 10 meq/hour. Peripheral administration not to exceed 20 meq/hour. High alert medication           1402          potassium chloride 40 mEq IVPB (premix)  Dose: 40 mEq  Freq: 2 times daily Route: IV  Start: 07/30/23 0900 End: 07/30/23 0758   Admin Instructions:   Patient must be on telemetry if rate is greater than 10 meq/hour. Must be administered via central line. High alert medication            0758-D/C'd       potassium chloride 40 mEq IVPB (premix)  Dose: 40 mEq  Freq: 2 times daily Route: IV  Start: 07/29/23 1345 End: 07/29/23 1345   Admin Instructions:   Patient must be on telemetry if rate is greater than 10 meq/hour. Must be administered via central line. High alert medication           1345-D/C'd            potassium chloride 40 mEq IVPB (premix)  Dose: 40 mEq  Freq: Once Route: IV  Start: 07/28/23 1330 End: 07/28/23 1330   Admin Instructions:   Patient must be on telemetry if rate is greater than 10 meq/hour. Must be administered via central line. High alert medication          1330-D/C'd  (1332) [C]           potassium chloride oral solution 40 mEq  Dose: 40 mEq  Freq: Once Route: PO  Start: 07/28/23 1130 End: 07/28/23 1157   Admin Instructions:   **DISPOSE IN 8 GALLON BLACK CONTAINER**          1157           potassium-sodium phosphateS (K-PHOS,PHOSPHA 250) -250 mg tablet 1 tablet  Dose: 1 tablet  Freq: 4 times daily (with meals and at bedtime) Route: PO  Start: 07/30/23 0800   Admin Instructions:   Administer with a full glass of water.  LOOK ALIKE SOUND ALIKE MED            9017     4936     1950     2146      0729     1200     1630     2200        umeclidinium-vilanterol 62.5-25 mcg/actuation inhaler 1 puff  Dose: 1 puff  Freq: Daily Route: IN  Start: 07/29/23 0900           (0915) 0818      0900        Medications 07/22 07/23 07/24 07/25 07/26 07/27 07/28 07/29 07/30 07/31         Continuous Meds Sorted by Name  for [facility] as of 07/31/23 0910  Legend:                          Inactive     Active     Other Encounter     Linked                 Medications 07/22 07/23 07/24 07/25 07/26 07/27 07/28 07/29 07/30 07/31   dextrose 5 % and sodium chloride 0.45 % with KCl 40 mEq/L infusion (premix)  Rate: 100 mL/hr Dose: 100 mL/hr  Freq: Continuous Route: IV  Indications of Use: IV Hydration  Last Dose: 100 mL/hr (07/31/23 0215)  Start: 07/30/23 0800            0848     1812      0215        lactated ringers infusion  Rate: 75 mL/hr Dose: 75 mL/hr  Freq: Continuous Route: IV  Indications of Use: IV Hydration  Last Dose: Stopped (07/30/23 0847)  Start: 07/28/23 1330 End: 07/30/23 0749          1433      0148     1331     2252      0749-D/C'd  0847               PRN Meds Sorted by Name  for [facility] as of 07/31/23 0910  Legend:                          Inactive     Active     Other Encounter     Linked                 Medications 07/22 07/23 07/24 07/25 07/26 07/27 07/28 07/29 07/30 07/31   acetaminophen (TYLENOL) tablet 650 mg  Dose: 650 mg  Freq: Every 6 hours PRN Route: PO  PRN Reasons: mild pain,headaches,fever  Indications of Use: FEVER,HEADACHE,MILD PAIN  Start: 07/28/23 1751                albuterol (PROVENTIL HFA,VENTOLIN HFA) inhaler 1 puff  Dose: 1 puff  Freq: Every 6 hours PRN Route: IN  PRN Reason: wheezing  Start: 07/28/23 1751   Admin Instructions:   USE WITH SPACER  Shake well before use  SEAL LEFTOVER/UNUSED MEDICATION IN A ZIP LOCK BAG AND SEND TO PHARMACY                ALPRAZolam (XANAX) tablet 1 mg  Dose: 1 mg  Freq: 3 times daily PRN Route: PO  PRN Reasons: anxiety,insomnia  Start: 07/28/23 1751   Admin Instructions:   High alert medication.  LOOK ALIKE SOUND ALIKE MED           1015 3912         calcium carbonate (TUMS) chewable tablet 500 mg  Dose: 500 mg  Freq: Daily PRN Route: PO  PRN Reasons: indigestion,heartburn  Start: 07/28/23 1751   Admin Instructions:   LOOK ALIKE SOUND ALIKE MED                fentanyl citrate (PF) 100 MCG/2ML  Freq: As needed Route: IV  Start: 07/28/23 1658 End: 07/28/23 1654          1654           HYDROmorphone (DILAUDID) injection 0.5 mg  Dose: 0.5 mg  Freq: Every 3 hours PRN Route: IV  PRN Reason: breakthrough pain  Start: 07/28/23 1751 End: 07/30/23 1417   Admin Instructions:   High alert medication. LOOK ALIKE SOUND ALIKE MED          2318      1803      0549     1417-D/C'd       HYDROmorphone HCl (DILAUDID) injection 0.2 mg  Dose: 0.2 mg  Freq: Every 4 hours PRN Route: IV  PRN Reason: severe pain  Start: 07/29/23 1343   Admin Instructions:   High alert medication.  LOOK ALIKE SOUND ALIKE MED            1158     174      [id number]        HYDROmorphone HCl (DILAUDID) injection 0.2 mg  Dose: 0.2 mg  Freq: Every 4 hours PRN Route: IV  PRN Reasons: severe pain,moderate pain  Start: 07/28/23 1751 End: 07/29/23 1343   Admin Instructions:   High alert medication.  LOOK ALIKE SOUND ALIKE MED          1841      0827     1343-D/C'd        iohexol (OMNIPAQUE) 350 MG/ML injection (SINGLE-DOSE) 100 mL  Dose: 100 mL  Freq: Once in imaging Route: IV  PRN Reason: contrast  Start: 07/28/23 1148 End: 07/28/23 1148          1148           lidocaine 1% buffered  Freq: As needed Route: INFILTRATION  Start: 07/28/23 1704 End: 07/28/23 1704          1704           metoprolol (LOPRESSOR) injection 2.5 mg  Dose: 2.5 mg  Freq: Every 6 hours PRN Route: IV  PRN Reason: high blood pressure  PRN Comment: systolic >166  Start: 89/63/48 1751   Admin Instructions:   Hold for heart rate less than 50 beats per minute. Administer over 1 minute.  LOOK ALIKE SOUND ALIKE MED                midazolam (VERSED) injection  Freq: As needed  Start: 07/28/23 1657 End: 07/28/23 1654          1651           ondansetron (ZOFRAN) injection 4 mg  Dose: 4 mg  Freq: Every 6 hours PRN Route: IV  PRN Reasons: nausea,vomiting  Start: 07/28/23 1751   Admin Instructions:   Push over 2 minutes. oxyCODONE (ROXICODONE) IR tablet 5 mg  Dose: 5 mg  Freq: Every 4 hours PRN Route: PO  PRN Reason: moderate pain  Start: 07/29/23 1335   Admin Instructions:   High alert medication. LOOK ALIKE SOUND ALIKE MED           3683 (6634) [phone]        saliva substitute (MOUTH KOTE) mucosal solution 5 spray  Dose: 5 spray  Freq: 4 times daily PRN Route: MT  PRN Reason: dry mouth  Start: 07/28/23 1751   Admin Instructions:   Swish 8-10 seconds then spit or swallow. simethicone (MYLICON) chewable tablet 80 mg  Dose: 80 mg  Freq: Every 6 hours PRN Route: PO  PRN Reason: flatulence  Start: 07/28/23 1751   Admin Instructions:   Chew before swallowing. Discharge Plan: d    Network Utilization Review Department  ATTENTION: Please call with any questions or concerns to [phone] and carefully listen to the prompts so that you are directed to the right person. All voicemails are confidential.  Joseph Goncalves all requests for admission clinical reviews, approved or denied determinations and any other requests to dedicated fax number below belonging to the campus where the patient is receiving treatment.  List of dedicated fax numbers for the Facilities:  [place] DENIALS (Administrative/Medical Necessity) [phone] [address] [facility] (Maternity/NICU/Pediatrics) [phone]   [address] [place] [address] Drive [phone]   [facility] [phone]   [address] [place] [address] N [phone]   [address] [place] [address] [address] [address] [facility]  [facility] [phone]

## 2023-08-01 VITALS
SYSTOLIC BLOOD PRESSURE: 131 MMHG | RESPIRATION RATE: 16 BRPM | WEIGHT: 167 LBS | TEMPERATURE: 98.2 F | DIASTOLIC BLOOD PRESSURE: 85 MMHG | HEART RATE: 81 BPM | BODY MASS INDEX: 22.03 KG/M2 | OXYGEN SATURATION: 96 %

## 2023-08-01 PROBLEM — K81.9 CHOLECYSTITIS: Status: ACTIVE | Noted: 2023-08-01

## 2023-08-01 LAB
ANION GAP SERPL CALCULATED.3IONS-SCNC: 5 MMOL/L
BASOPHILS # BLD AUTO: 0.03 THOUSANDS/ÂΜL (ref 0–0.1)
BASOPHILS NFR BLD AUTO: 0 % (ref 0–1)
BUN SERPL-MCNC: 3 MG/DL (ref 5–25)
CALCIUM SERPL-MCNC: 8.3 MG/DL (ref 8.4–10.2)
CHLORIDE SERPL-SCNC: 99 MMOL/L (ref 96–108)
CO2 SERPL-SCNC: 33 MMOL/L (ref 21–32)
CREAT SERPL-MCNC: 0.6 MG/DL (ref 0.6–1.3)
EOSINOPHIL # BLD AUTO: 0.1 THOUSAND/ÂΜL (ref 0–0.61)
EOSINOPHIL NFR BLD AUTO: 1 % (ref 0–6)
ERYTHROCYTE [DISTWIDTH] IN BLOOD BY AUTOMATED COUNT: 14.3 % (ref 11.6–15.1)
GFR SERPL CREATININE-BSD FRML MDRD: 121 ML/MIN/1.73SQ M
GLUCOSE SERPL-MCNC: 101 MG/DL (ref 65–140)
HCT VFR BLD AUTO: 31 % (ref 36.5–49.3)
HGB BLD-MCNC: 10.5 G/DL (ref 12–17)
IMM GRANULOCYTES # BLD AUTO: 0.05 THOUSAND/UL (ref 0–0.2)
IMM GRANULOCYTES NFR BLD AUTO: 1 % (ref 0–2)
LYMPHOCYTES # BLD AUTO: 1.82 THOUSANDS/ÂΜL (ref 0.6–4.47)
LYMPHOCYTES NFR BLD AUTO: 21 % (ref 14–44)
MCH RBC QN AUTO: 28.2 PG (ref 26.8–34.3)
MCHC RBC AUTO-ENTMCNC: 33.9 G/DL (ref 31.4–37.4)
MCV RBC AUTO: 83 FL (ref 82–98)
MONOCYTES # BLD AUTO: 0.43 THOUSAND/ÂΜL (ref 0.17–1.22)
MONOCYTES NFR BLD AUTO: 5 % (ref 4–12)
NEUTROPHILS # BLD AUTO: 6.11 THOUSANDS/ÂΜL (ref 1.85–7.62)
NEUTS SEG NFR BLD AUTO: 72 % (ref 43–75)
NRBC BLD AUTO-RTO: 0 /100 WBCS
PLATELET # BLD AUTO: 191 THOUSANDS/UL (ref 149–390)
PMV BLD AUTO: 10.4 FL (ref 8.9–12.7)
POTASSIUM SERPL-SCNC: 3 MMOL/L (ref 3.5–5.3)
RBC # BLD AUTO: 3.72 MILLION/UL (ref 3.88–5.62)
SODIUM SERPL-SCNC: 137 MMOL/L (ref 135–147)
WBC # BLD AUTO: 8.54 THOUSAND/UL (ref 4.31–10.16)

## 2023-08-01 PROCEDURE — 80048 BASIC METABOLIC PNL TOTAL CA: CPT | Performed by: PHYSICIAN ASSISTANT

## 2023-08-01 PROCEDURE — 85025 COMPLETE CBC W/AUTO DIFF WBC: CPT | Performed by: PHYSICIAN ASSISTANT

## 2023-08-01 PROCEDURE — NC001 PR NO CHARGE: Performed by: PHYSICIAN ASSISTANT

## 2023-08-01 PROCEDURE — 99238 HOSP IP/OBS DSCHRG MGMT 30/<: CPT | Performed by: SPECIALIST

## 2023-08-01 RX ORDER — METRONIDAZOLE 500 MG/1
500 TABLET ORAL EVERY 8 HOURS SCHEDULED
Status: DISCONTINUED | OUTPATIENT
Start: 2023-08-01 | End: 2023-08-01 | Stop reason: HOSPADM

## 2023-08-01 RX ORDER — METRONIDAZOLE 500 MG/1
500 TABLET ORAL EVERY 8 HOURS SCHEDULED
Qty: 21 TABLET | Refills: 0 | Status: SHIPPED | OUTPATIENT
Start: 2023-08-01 | End: 2023-08-08

## 2023-08-01 RX ORDER — POTASSIUM CHLORIDE 14.9 MG/ML
20 INJECTION INTRAVENOUS ONCE
Status: COMPLETED | OUTPATIENT
Start: 2023-08-01 | End: 2023-08-01

## 2023-08-01 RX ORDER — LEVOFLOXACIN 750 MG/1
750 TABLET ORAL EVERY 24 HOURS
Qty: 7 TABLET | Refills: 0 | Status: SHIPPED | OUTPATIENT
Start: 2023-08-01 | End: 2023-08-08

## 2023-08-01 RX ADMIN — UMECLIDINIUM BROMIDE AND VILANTEROL TRIFENATATE 1 PUFF: 62.5; 25 POWDER RESPIRATORY (INHALATION) at 09:00

## 2023-08-01 RX ADMIN — DIBASIC SODIUM PHOSPHATE, MONOBASIC POTASSIUM PHOSPHATE AND MONOBASIC SODIUM PHOSPHATE 1 TABLET: 852; 155; 130 TABLET ORAL at 07:03

## 2023-08-01 RX ADMIN — POTASSIUM CHLORIDE 20 MEQ: 200 INJECTION, SOLUTION INTRAVENOUS at 08:30

## 2023-08-01 RX ADMIN — POTASSIUM CHLORIDE 20 MEQ: 200 INJECTION, SOLUTION INTRAVENOUS at 10:24

## 2023-08-01 RX ADMIN — KETOROLAC TROMETHAMINE 15 MG: 30 INJECTION, SOLUTION INTRAMUSCULAR at 06:00

## 2023-08-01 RX ADMIN — METRONIDAZOLE 500 MG: 500 TABLET ORAL at 07:03

## 2023-08-01 NOTE — NURSING NOTE
Discharge instructions given to pt. Extensive education on how to [facility] Tube and how to change the dressing around the tube, emptying the drain given. Pt states he feels comfortable with taking care of the tube. IV removed. Christo removed.  All belongings with pt

## 2023-08-01 NOTE — DISCHARGE INSTR - AVS FIRST PAGE
Diet: Try to stick to a low fat diet. Please drink lots of water. Medications: Resume all of your previous medications, unless otherwise instructed. A good option for pain control is to start with acetaminophen(Tylenol) 650mg and ibuprofen(Advil) 400-600mg and alternate taking them every 3 hours. Insure that you do not take more than 4000 mg of Tylenol per day. We recommend taking an OTC potassium supplement (potassium-sodium phosphate) and then follow up with your primary care provider in 1 to 2 weeks. Please take the remaining 7 days of the antibiotics (levaquin and flagyl) as directed    Call the office: If you are experiencing any of the following: fevers above 101.5°, significant nausea or vomiting, if the wound develops drainage and/or excessive redness around the wound, or if you have significant diarrhea or other worsening symptoms. Please get some bloodwork done 1 week after hospital discharge to check your potassium levels. Quit smoking if able! This is important!

## 2023-08-01 NOTE — PLAN OF CARE
Problem: PAIN - ADULT  Goal: Verbalizes/displays adequate comfort level or baseline comfort level  Description: Interventions:  - Encourage patient to monitor pain and request assistance  - Assess pain using appropriate pain scale  - Administer analgesics based on type and severity of pain and evaluate response  - Implement non-pharmacological measures as appropriate and evaluate response  - Consider cultural and social influences on pain and pain management  - Notify physician/advanced practitioner if interventions unsuccessful or patient reports new pain  Outcome: Progressing     Problem: INFECTION - ADULT  Goal: Absence or prevention of progression during hospitalization  Description: INTERVENTIONS:  - Assess and monitor for signs and symptoms of infection  - Monitor lab/diagnostic results  - Monitor all insertion sites, i.e. indwelling lines, tubes, and drains  - Monitor endotracheal if appropriate and nasal secretions for changes in amount and color  - [place] appropriate cooling/warming therapies per order  - Administer medications as ordered  - Instruct and encourage patient and family to use good hand hygiene technique  - Identify and instruct in appropriate isolation precautions for identified infection/condition  Outcome: Progressing     Problem: GASTROINTESTINAL - ADULT  Goal: Minimal or absence of nausea and/or vomiting  Description: INTERVENTIONS:  - Administer IV fluids if ordered to ensure adequate hydration  - Maintain NPO status until nausea and vomiting are resolved  - Nasogastric tube if ordered  - Administer ordered antiemetic medications as needed  - Provide nonpharmacologic comfort measures as appropriate  - Advance diet as tolerated, if ordered  - Consider nutrition services referral to assist patient with adequate nutrition and appropriate food choices  Outcome: Progressing

## 2023-08-01 NOTE — PLAN OF CARE
Problem: PAIN - ADULT  Goal: Verbalizes/displays adequate comfort level or baseline comfort level  Description: Interventions:  - Encourage patient to monitor pain and request assistance  - Assess pain using appropriate pain scale  - Administer analgesics based on type and severity of pain and evaluate response  - Implement non-pharmacological measures as appropriate and evaluate response  - Consider cultural and social influences on pain and pain management  - Notify physician/advanced practitioner if interventions unsuccessful or patient reports new pain  Outcome: Adequate for Discharge     Problem: INFECTION - ADULT  Goal: Absence or prevention of progression during hospitalization  Description: INTERVENTIONS:  - Assess and monitor for signs and symptoms of infection  - Monitor lab/diagnostic results  - Monitor all insertion sites, i.e. indwelling lines, tubes, and drains  - Monitor endotracheal if appropriate and nasal secretions for changes in amount and color  - [place] appropriate cooling/warming therapies per order  - Administer medications as ordered  - Instruct and encourage patient and family to use good hand hygiene technique  - Identify and instruct in appropriate isolation precautions for identified infection/condition  Outcome: Adequate for Discharge  Goal: Absence of fever/infection during neutropenic period  Description: INTERVENTIONS:  - Monitor WBC    Outcome: Adequate for Discharge     Problem: GASTROINTESTINAL - ADULT  Goal: Minimal or absence of nausea and/or vomiting  Description: INTERVENTIONS:  - Administer IV fluids if ordered to ensure adequate hydration  - Maintain NPO status until nausea and vomiting are resolved  - Nasogastric tube if ordered  - Administer ordered antiemetic medications as needed  - Provide nonpharmacologic comfort measures as appropriate  - Advance diet as tolerated, if ordered  - Consider nutrition services referral to assist patient with adequate nutrition and appropriate food choices  Outcome: Adequate for Discharge  Goal: Maintains or returns to baseline bowel function  Description: INTERVENTIONS:  - Assess bowel function  - Encourage oral fluids to ensure adequate hydration  - Administer IV fluids if ordered to ensure adequate hydration  - Administer ordered medications as needed  - Encourage mobilization and activity  - Consider nutritional services referral to assist patient with adequate nutrition and appropriate food choices  Outcome: Adequate for Discharge  Goal: Maintains adequate nutritional intake  Description: INTERVENTIONS:  - Monitor percentage of each meal consumed  - Identify factors contributing to decreased intake, treat as appropriate  - Assist with meals as needed  - Monitor I&O, weight, and lab values if indicated  - Obtain nutrition services referral as needed  Outcome: Adequate for Discharge

## 2023-08-01 NOTE — DISCHARGE SUMMARY
Discharge Summary - Bonita Simmonds 41 y.o. male MRN: [id number]    Unit/Bed#: [address] [place] [address] Encounter: [id number]    Admission Date: 7/28/2023   Discharge Date: 8/1/2023    Admitting Diagnosis:   Acute cholecystitis [K81.0]  Abdominal pain [R10.9]    Discharge Diagnoses: Principal Problem:    Cholecystitis      Consultations: Interventional radiology    Procedures Performed: Percutaneous cholecystostomy tube placement    HPI per admission H&P:  Bonita Simmonds is a 41 y.o. male whom is a current every day smoker with history of long COVID, anxiety, and depression who presents with almost two weeks of nausea, vomiting, and RUQ pain. Patient had some episodes of loose stool but no BM since two days ago. Pain comes in waves and has always been located in the RUQ and associated with some abdominal bloating. Pain is worse with bending over or sitting up from a lying position. Patient last ate any food 6 days ago. Has only been drinking some water. Last episode of emesis was two days ago. Denies any fevers or chills. Over the past year has decreased from 3 packs of cigarettes a day to less than 1. Recently tried marijuana but only once. Patient states he takes metoprolol daily, some sort of anti-depressant medication daily, and Xanax as needed. Hospital Course: Bonita Simmonds is a 41 y.o. male admitted for cholecystitis. Patient had an allergic reaction as evidenced by hives and pruritus upon initiating Zosyn in the emergency department. Patient was given Benadryl and methylprednisolone and remained hemodynamically stable. Patient was sent to interventional radiology for percutaneous cholecystostomy tube secondary to the fact that he had nausea, vomiting, and right upper quadrant pain symptoms for greater than 5 days. Patient was found to be severely hypokalemic on admission, therefore electrolyte repletion was prioritized along with keeping patient on Levaquin and Flagyl for total 10 day course. Patient's pain gradually improved and he was able to tolerate an oral diet. Patient was sent home with instruction to follow up with PCP and repeat his potassium levels one week after discharge. Condition at Discharge: good     Discharge instructions/Information to patient and family:   See after visit summary for information provided to patient and family. Provisions for Follow-Up Care:  See after visit summary for information related to follow-up care and any pertinent home health orders. Disposition: Home    Planned Readmission: No    Discharge Statement   I spent 20 minutes discharging the patient. This time was spent on the day of discharge. I had direct contact with the patient on the day of discharge. Additional documentation is required if more than 30 minutes were spent on discharge. Discharge Medications:  See after visit summary for reconciled discharge medications provided to patient and family.

## 2023-08-01 NOTE — PROGRESS NOTES
Progress Note - General Surgery   Kenan Mabry 41 y.o. male MRN: [id number]  Unit/Bed#: [address] [place] [address] Encounter: [id number]    Assessment:  1) acute cholecystitis status post percutaneous cholecystostomy tube placement -AVSS, reduced abdominal tenderness, pending labs, leukocytosis normalized, tolerating regular diet, no nausea or vomiting, passing gas, have not seen potassium level yet, cholecystostomy tube approximately 160 cc bilious output from over the last 24 hours     Plan:  1)   -Transitioned over to p.o. levofloxacin and Flagyl  -Discontinue IV fluids  -Potassium repletion as needed  -DVT prophylaxis  -Provided tolerating into the afternoon on p.o. antibiotics with discharge today 8/1/2023 with remainder of 7-day course of antibiotics  -Follow-up in 4 weeks for tube check with the IR team  -Patient does not need to be discharged with p.o. pain medication, patient should be taking Suboxone, ibuprofen, Tylenol  -Continue regular diet  -Provided potassium is adequate no need for further labs or admission    Subjective/Objective   Chief Complaint: I feel much better    Subjective: Patient was seen examined at bedside. Patient denies any acute events overnight. Patient denies any fevers or chills. Patient denies any nausea or vomiting. Patient is passing gas no bowel movements yet. Patient reports significant reduction of abdominal pain in the upper right quadrant. Patient still has cholecystostomy tube in place. Patient was able to tolerate regular diet without any issues. Objective:     Blood pressure 129/83, pulse (!) 107, temperature 97.7 °F (36.5 °C), temperature source Oral, resp. rate 16, SpO2 91 %. ,There is no height or weight on file to calculate BMI.       Intake/Output Summary (Last 24 hours) at 8/1/2023 0708  Last data filed at 8/1/2023 0201  Gross per 24 hour   Intake 730 ml   Output 95 ml   Net 635 ml       Invasive Devices     Peripheral Intravenous Line  Duration           Peripheral IV 07/28/23 Left Antecubital 3 days          Drain  Duration           Cholecystostomy Tube 3 days                Physical Exam: /83 (BP Location: Right arm)   Pulse (!) 107   Temp 97.7 °F (36.5 °C) (Oral)   Resp 16   SpO2 91%   General appearance: alert and oriented, in no acute distress  Head: Normocephalic, without obvious abnormality, atraumatic  Lungs: clear to auscultation bilaterally  Heart: regular rate and rhythm, S1, S2 normal, no murmur, click, rub or gallop  Abdomen: soft, non-tender; bowel sounds normal; no masses,  no organomegaly and Minimal tenderness in the upper right quadrant  Skin: Cholecystostomy tube in place    Lab, Imaging and other studies:  I have personally reviewed pertinent lab results.   , CBC:   Lab Results   Component Value Date    WBC 8.54 08/01/2023    HGB 10.5 (L) 08/01/2023    HCT 31.0 (L) 08/01/2023    MCV 83 08/01/2023     08/01/2023    RBC 3.72 (L) 08/01/2023    MCH 28.2 08/01/2023    MCHC 33.9 08/01/2023    RDW 14.3 08/01/2023    MPV 10.4 08/01/2023    NRBC 0 08/01/2023   , CMP:   Lab Results   Component Value Date    SODIUM 137 08/01/2023    K 3.0 (L) 08/01/2023    CL 99 08/01/2023    CO2 33 (H) 08/01/2023    BUN 3 (L) 08/01/2023    CREATININE 0.60 08/01/2023    CALCIUM 8.3 (L) 08/01/2023    EGFR 121 08/01/2023     VTE Pharmacologic Prophylaxis: Enoxaparin (Lovenox)  VTE Mechanical Prophylaxis: sequential compression device

## 2023-08-02 NOTE — UTILIZATION REVIEW
NOTIFICATION OF ADMISSION DISCHARGE   This is a Notification of Discharge from [facility]. Please be advised that this patient has been discharge from our facility. Below you will find the admission and discharge date and time including the patient’s disposition. UTILIZATION REVIEW CONTACT:  Sarthak Shoulder  Utilization   Network Utilization Review Department  Phone: [phone] x carefully listen to the prompts. All voicemails are confidential.  Email: [email or web address]     ADMISSION INFORMATION  PRESENTATION DATE: 7/28/2023  9:38 AM  OBERVATION ADMISSION DATE:   INPATIENT ADMISSION DATE: 7/28/23  1:51 PM   DISCHARGE DATE: 8/1/2023  1:44 PM   DISPOSITION:Home/Self Care    IMPORTANT INFORMATION:  Send all requests for admission clinical reviews, approved or denied determinations and any other requests to dedicated fax number below belonging to the campus where the patient is receiving treatment.  List of dedicated fax numbers:  [place] DENIALS (Administrative/Medical Necessity) [phone] [address] [facility] (Maternity/NICU/Pediatrics) [phone]   [facility] [phone]   [facility] [phone] [address] [facility] [phone]   [address] [place] [address] [phone]   [facility] [phone]   [address] [place] [address] [facility] [phone]   [address] [place] [address] [phone] [address] [facility] [phone] [address] [facility] [address] 32[facility] [phone]

## 2023-08-04 ENCOUNTER — NURSE TRIAGE (OUTPATIENT)
Dept: OTHER | Facility: OTHER | Age: 48
End: 2023-08-04

## 2023-08-04 NOTE — TELEPHONE ENCOUNTER
Pt states upon discharge from hospital he was prescribed K=Phos but no pharmacy near him has this in stock. It was ordered by pharmacy and is expected to be in on Monday. In the meantime. , pt did get OTC Potassium tablets and now he is wondering if it is okay for him to take these over the weekend until the K-Phos arrives, or will it be harmful.

## 2023-08-04 NOTE — TELEPHONE ENCOUNTER
Regarding: was in the ER for his gallbladder/potassium medication is unavailable at the pharmacies he tried  ----- Message from Eloy Vasquez sent at 8/4/2023  4:18 PM EDT -----  Pt called "I was in the ER for gallbladder issues and the potassium medication I was prescribed I can't find at any pharmacies. "

## 2023-08-05 ENCOUNTER — APPOINTMENT (OUTPATIENT)
Dept: LAB | Facility: HOSPITAL | Age: 48
End: 2023-08-05
Payer: COMMERCIAL

## 2023-08-05 ENCOUNTER — HOSPITAL ENCOUNTER (EMERGENCY)
Facility: HOSPITAL | Age: 48
Discharge: HOME | End: 2023-08-05
Attending: STUDENT IN AN ORGANIZED HEALTH CARE EDUCATION/TRAINING PROGRAM
Payer: COMMERCIAL

## 2023-08-05 ENCOUNTER — NURSE TRIAGE (OUTPATIENT)
Dept: OTHER | Facility: OTHER | Age: 48
End: 2023-08-05

## 2023-08-05 VITALS
SYSTOLIC BLOOD PRESSURE: 132 MMHG | WEIGHT: 167 LBS | OXYGEN SATURATION: 98 % | RESPIRATION RATE: 22 BRPM | DIASTOLIC BLOOD PRESSURE: 87 MMHG | BODY MASS INDEX: 22.03 KG/M2 | TEMPERATURE: 97.5 F | HEART RATE: 92 BPM

## 2023-08-05 DIAGNOSIS — T85.518A CHOLECYSTOSTOMY TUBE DYSFUNCTION, INITIAL ENCOUNTER: Primary | ICD-10-CM

## 2023-08-05 PROCEDURE — 99283 EMERGENCY DEPT VISIT LOW MDM: CPT

## 2023-08-05 PROCEDURE — 99284 EMERGENCY DEPT VISIT MOD MDM: CPT | Performed by: STUDENT IN AN ORGANIZED HEALTH CARE EDUCATION/TRAINING PROGRAM

## 2023-08-05 NOTE — TELEPHONE ENCOUNTER
Reason for Disposition  • Sounds like a serious complication to the triager    Answer Assessment - Initial Assessment Questions  1. SYMPTOM: "What's the main symptom you're concerned about?" (e.g., pain, fever, vomiting)      Unable to open percutaneous ben tube since coming home on 8/1 to follow prescribed daily flushes of 10mL NSS    2. ONSET: "When did  start?"      8/1    3. SURGERY: "What surgery was performed?"      Ben tube inserted by IR    4. DATE of SURGERY: "When was surgery performed?"       7/28    5. ANESTHESIA: " What type of anesthesia did you have?" (e.g., general, spinal, epidural, local)      N/A    6. PAIN: "Is there any pain?" If Yes, ask: "How bad is it?"  (Scale 1-10; or mild, moderate, severe)      No pain    7.  OTHER SYMPTOMS: "Do you have any other symptoms?" (e.g., drainage from wound, painful urination, constipation)        Denies any other symptoms    Protocols used: POST-OP SYMPTOMS AND QUESTIONS-[facility]

## 2023-08-05 NOTE — TELEPHONE ENCOUNTER
Regarding: gallstone bag issue  ----- Message from Sagar Ochoa sent at 8/5/2023  8:42 AM EDT -----  "I am having an issue unhooking this gallstone bag from me"

## 2023-08-05 NOTE — ED PROVIDER NOTES
History  Chief Complaint   Patient presents with   • Post-op Problem     Has tube inserted draining bile, is supposed to unscrew cap and flush with saling every day. Last done two days ago, unable to get screw off     Patient is a 65-year-old male, past medical history including anxiety, depression, and recent acute cholecystitis status post percutaneous cholecystostomy drain placement, who presents to the emergency department for difficulty unscrewing his drain. Patient states he was told to flush his drain daily. For the past 2 days the part that screws in closest to his body has been stuck and he has been unable to open it. He now presents for help unscrewing  It. No other concerns. Chart reviewed. Patient with recent admission from 7/28/2023. Initially presented to the emergency department for right upper quadrant pain. Imaging revealed acute cholecystitis. He subsequently underwent percutaneous cholecystostomy that same day. He was discharged on 8/1/2023. Prior to Admission Medications   Prescriptions Last Dose Informant Patient Reported? Taking?    ALPRAZolam (XANAX) 1 mg tablet   Yes No   Sig: TAKE 1 TABLET BY MOUTH THREE TIMES A DAY AS NEEDED   Anoro Ellipta 62.5-25 MCG/INH inhaler   Yes No   Sig: INHALE 1 PUFF BY INHALATION ROUTE EVERY DAY AT [place] DAY   Patient not taking: Reported on 7/28/2023   albuterol (PROVENTIL HFA,VENTOLIN HFA) 90 mcg/act inhaler   Yes No   Sig: INL 2 PFS PO Q 4 H PRF WHZ   levofloxacin (LEVAQUIN) 750 mg tablet   No No   Sig: Take 1 tablet (750 mg total) by mouth every 24 hours for 7 days   metroNIDAZOLE (FLAGYL) 500 mg tablet   No No   Sig: Take 1 tablet (500 mg total) by mouth every 8 (eight) hours for 7 days   potassium-sodium phosphateS (K-PHOS,PHOSPHA 250) 155-852-130 mg tablet   No No   Sig: Take 1 tablet by mouth 4 (four) times a day (with meals and at bedtime)   sodium chloride, PF, 0.9 %   No No   Sig: 10 mL by Intracatheter route daily Intracatheter flushing daily. May substitute prefilled syringe with normal saline 10 mL vials, 10 mL syringes, and 18 g blunt needles      Facility-Administered Medications: None       Past Medical History:   Diagnosis Date   • Allergic rhinitis due to allergen 10/1/2018   • Anxiety    • Chronic pain    • Depression 10/17/2016       Past Surgical History:   Procedure Laterality Date   • ELBOW ARTHROSCOPY     • ELBOW SURGERY      x2   • IR CHOLECYSTOSTOMY TUBE PLACEMENT  7/28/2023   • WRIST SURGERY         History reviewed. No pertinent family history. I have reviewed and agree with the history as documented. E-Cigarette/Vaping   • E-Cigarette Use Never User      E-Cigarette/Vaping Substances     Social History     Tobacco Use   • Smoking status: Every Day     Packs/day: 1.00     Years: 30.00     Total pack years: 30.00     Types: Cigarettes   • Smokeless tobacco: Never   Vaping Use   • Vaping Use: Never used   Substance Use Topics   • Alcohol use: Not Currently   • Drug use: No     Comment: Is in pain mangement,  to get off pain meds. Review of Systems   Gastrointestinal: Negative for abdominal pain. All other systems reviewed and are negative. Physical Exam  Physical Exam  Vitals and nursing note reviewed. Constitutional:       General: He is not in acute distress. Appearance: He is well-developed. He is not ill-appearing, toxic-appearing or diaphoretic. HENT:      Head: Normocephalic and atraumatic. Right Ear: External ear normal.      Left Ear: External ear normal.      Nose: Nose normal.   Eyes:      General: Lids are normal. No scleral icterus. Cardiovascular:      Rate and Rhythm: Normal rate and regular rhythm. Pulmonary:      Effort: Pulmonary effort is normal. No respiratory distress. Abdominal:      Comments: Percutaneous cholecystostomy tube in place draining green bile. No signs of infection. Skin:     General: Skin is warm and dry.    Neurological:      General: No focal deficit present. Mental Status: He is alert. Psychiatric:         Mood and Affect: Mood normal.         Behavior: Behavior normal.         Vital Signs  ED Triage Vitals [08/05/23 1150]   Temperature Pulse Respirations Blood Pressure SpO2   97.5 °F (36.4 °C) 92 22 132/87 98 %      Temp Source Heart Rate Source Patient Position - Orthostatic VS BP Location FiO2 (%)   Tympanic Monitor Sitting Right arm --      Pain Score       2           Vitals:    08/05/23 1150   BP: 132/87   Pulse: 92   Patient Position - Orthostatic VS: Sitting         Visual Acuity      ED Medications  Medications - No data to display    Diagnostic Studies  Results Reviewed     None                 No orders to display              Procedures  Procedures         ED Course                                             Medical Decision Making  Patient is a 41 y.o. male who presents to the ED requesting help with his cholecystostomy tube. Patient is nontoxic and well-appearing. Vitals are stable. Cholecystostomy tube was successfully unscrewed from port closest to patient's body. It was then flushed without difficulty. As there is no indication for further work-up or treatment in the emergency department at this time will discharge. Recommend surgery follow-up. Return precautions discussed. Patient verbalized understanding and agreed to plan of care. Portions of the record may have been created with voice recognition software. Occasional wrong word or "sound a like" substitutions may have occurred due to the inherent limitations of voice recognition software. Read the chart carefully and recognize, using context, where substitutions have occurred. Cholecystostomy tube dysfunction, initial encounter: acute illness or injury  Amount and/or Complexity of Data Reviewed  External Data Reviewed: notes.           Disposition  Final diagnoses:   Cholecystostomy tube dysfunction, initial encounter     Time reflects when diagnosis was documented in both MDM as applicable and the Disposition within this note     Time User Action Codes Description Comment    8/5/2023 12:01 PM Vesta Gutierres Add [U35.835N] Cholecystostomy tube dysfunction, initial encounter       ED Disposition     ED Disposition   Discharge    Condition   Stable    Date/Time   Sat Aug 5, 2023 12:00 PM    Comment   Haris Hemp discharge to home/self care. Follow-up Information     Follow up With Specialties Details Why Contact Info Additional Information    Brenda White MD Internal Medicine   [address] [place] [address]  [facility] Emergency Department Emergency Medicine   [address]  [address] [address]  [address] [facility] Emergency Department, [address] [place], [facility] [address]          Discharge Medication List as of 8/5/2023 12:01 PM      CONTINUE these medications which have NOT CHANGED    Details   albuterol (PROVENTIL HFA,VENTOLIN HFA) 90 mcg/act inhaler INL 2 PFS PO Q 4 H PRF WHZ, Historical Med      ALPRAZolam (XANAX) 1 mg tablet TAKE 1 TABLET BY MOUTH THREE TIMES A DAY AS NEEDED, Historical Med      Anoro Ellipta 62.5-25 MCG/INH inhaler INHALE 1 PUFF BY INHALATION ROUTE EVERY DAY AT THE SAME TIME EACH DAY, Historical Med      levofloxacin (LEVAQUIN) 750 mg tablet Take 1 tablet (750 mg total) by mouth every 24 hours for 7 days, Starting Tue 8/1/2023, Until Tue 8/8/2023, Normal      metroNIDAZOLE (FLAGYL) 500 mg tablet Take 1 tablet (500 mg total) by mouth every 8 (eight) hours for 7 days, Starting Tue 8/1/2023, Until Tue 8/8/2023, Normal      potassium-sodium phosphateS (K-PHOS,PHOSPHA 250) 155-852-130 mg tablet Take 1 tablet by mouth 4 (four) times a day (with meals and at bedtime), Starting Tue 8/1/2023, No Print      sodium chloride, PF, 0.9 % 10 mL by Intracatheter route daily Intracatheter flushing daily.  May substitute prefilled syringe with normal saline 10 mL vials, 10 mL syringes, and 18 g blunt needles, Starting Fri 7/28/2023, Until Thu 10/26/2023, Normal             No discharge procedures on file.     PDMP Review     None          ED Provider  Electronically Signed by           Fco Kelley DO  08/05/23 5312

## 2023-08-05 NOTE — DISCHARGE INSTRUCTIONS
Your cholecystostomy tube was unscrewed and flushed. Please follow-up with the surgeon as discussed. Return to the emergency department for any new or concerning symptoms.

## 2023-08-11 ENCOUNTER — TELEPHONE (OUTPATIENT)
Dept: SURGERY | Facility: CLINIC | Age: 48
End: 2023-08-11

## 2023-08-11 DIAGNOSIS — K81.0 ACUTE CHOLECYSTITIS: ICD-10-CM

## 2023-08-11 RX ORDER — SODIUM CHLORIDE 9 MG/ML
10 INJECTION INTRAVENOUS DAILY
Qty: 900 ML | Refills: 0 | Status: SHIPPED | OUTPATIENT
Start: 2023-08-11 | End: 2023-11-09

## 2023-08-11 NOTE — TELEPHONE ENCOUNTER
Patient was seen in hospital has a Cholecystostomy tube. Left message finished RX of Levaquin, Flagyl. Does he need and further medication? Please call patient.   [phone]

## 2023-08-15 ENCOUNTER — TELEPHONE (OUTPATIENT)
Dept: SURGERY | Facility: CLINIC | Age: 48
End: 2023-08-15

## 2023-08-15 DIAGNOSIS — K81.0 ACUTE CHOLECYSTITIS: Primary | ICD-10-CM

## 2023-08-15 RX ORDER — SODIUM CHLORIDE 9 MG/ML
5 INJECTION INTRAVENOUS EVERY 12 HOURS
Qty: 250 ML | Refills: 0 | Status: SHIPPED | OUTPATIENT
Start: 2023-08-15

## 2023-08-15 NOTE — TELEPHONE ENCOUNTER
Patient called needs refill of Sodium Chloride Syringes sent to [facility] [phone]. Patient states he is completely out of them.

## 2023-08-24 ENCOUNTER — OFFICE VISIT (OUTPATIENT)
Dept: SURGERY | Facility: CLINIC | Age: 48
End: 2023-08-24
Payer: COMMERCIAL

## 2023-08-24 VITALS
HEIGHT: 73 IN | TEMPERATURE: 97.5 F | BODY MASS INDEX: 23.09 KG/M2 | SYSTOLIC BLOOD PRESSURE: 122 MMHG | OXYGEN SATURATION: 97 % | WEIGHT: 174.2 LBS | DIASTOLIC BLOOD PRESSURE: 75 MMHG | HEART RATE: 92 BPM

## 2023-08-24 DIAGNOSIS — K81.0 ACUTE CHOLECYSTITIS: ICD-10-CM

## 2023-08-24 DIAGNOSIS — Z09 SURGICAL FOLLOW-UP CARE: Primary | ICD-10-CM

## 2023-08-24 DIAGNOSIS — T85.518A CHOLECYSTOSTOMY TUBE DYSFUNCTION: ICD-10-CM

## 2023-08-24 PROCEDURE — 99213 OFFICE O/P EST LOW 20 MIN: CPT | Performed by: SPECIALIST

## 2023-08-24 NOTE — PROGRESS NOTES
General Surgery Office Visit Follow up   [facility]  Patient: Celsa Crawley   : 1975 Sex: male MRN: [id number]   CSN: [id number] PCP: Will Salvador MD    Assessment/ Plan:  Celsa Crawley is a 57 y.o. 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  Diet well  Normal bowel movement no constipation or diarrhea  Ambulating well     Vitals:   /75 (BP Location: Left arm, Patient Position: Sitting, Cuff Size: Large)   Pulse 92   Temp 97.5 °F (36.4 °C) (Core)   Ht 6' 1" (1.854 m)   Wt 79 kg (174 lb 3.2 oz)   SpO2 97%   BMI 22.98 kg/m²     Active medications:    Current Outpatient Medications:   •  albuterol (PROVENTIL HFA,VENTOLIN HFA) 90 mcg/act inhaler, INL 2 PFS PO Q 4 H PRF WHZ, Disp: , Rfl: 3  •  ALPRAZolam (XANAX) 1 mg tablet, TAKE 1 TABLET BY MOUTH THREE TIMES A DAY AS NEEDED, Disp: , Rfl:   •  potassium-sodium phosphateS (K-PHOS,PHOSPHA 250) 155-852-130 mg tablet, Take 1 tablet by mouth 4 (four) times a day (with meals and at bedtime), Disp: , Rfl: 0  •  sodium chloride, PF, 0.9 %, Inject 5 mL into a catheter in a vein every 12 (twelve) hours, Disp: 250 mL, Rfl: 0  •  Anoro Ellipta 62.5-25 MCG/INH inhaler, INHALE 1 PUFF BY INHALATION ROUTE EVERY DAY AT THE SAME TIME EACH DAY (Patient not taking: Reported on 7/28/2023), Disp: , Rfl:   •  sodium chloride, PF, 0.9 %, 10 mL by Intracatheter route daily Intracatheter flushing daily. May substitute prefilled syringe with normal saline 10 mL vials, 10 mL syringes, and 18 g blunt needles (Patient not taking: Reported on 8/24/2023), Disp: 900 mL, Rfl: 0    Physical Exam:   General Alert awake   Normocephalic atraumatic PERRLA   Lungs clear bilaterally  Cardiac normal S1 normal S2  Abdomen soft,non tender Bowel sounds present  Skin: surgical dressing is C/D/I  Ext: No clubbing, cyanosis, edema    Visit Diagnosis: . Diagnoses and all orders for this visit:    Surgical follow-up care    Acute cholecystitis  -     CBC and differential; Future  -     Comprehensive metabolic panel; Future  -     US abdomen limited; Future    Cholecystostomy tube dysfunction  -     CBC and differential; Future  -     Comprehensive metabolic panel; Future  -     US abdomen limited; Future       Plan of care was discussed with patient in detail    Pertinent labs reviewed  Most Recent Labs:   No visits with results within 2 Week(s) from this visit. Latest known visit with results is:   No results displayed because visit has over 200 results. Pertinent images and available reads personally reviewed  Procedure: IR cholecystostomy tube placement    Result Date: 7/28/2023  Narrative: Cholecystostomy tube placement under ultrasound and fluoro Clinical History:  Cholecystitis Contrast: 10 cc of Omnipaque 350 Fluoro time: 1.0 Radiation dose: 30 mGy Number of Images: 5 Conscious sedation time: 15 min Technique: The patient was brought to the interventional radiology suite and placed supine on the table. The skin of the right upper quadrant was prepped and draped in the usual sterile fashion. After local anesthesia was administered to the skin, a 19 gauge needle was advanced percutaneously under direct ultrasound guidance into the gallbladder via a transhepatic route.  5 mL of tan purulent bile was removed and sent for culture and sensitivity with gram stain. A heavy duty wire was advanced through the needle, and the needle was removed. After tract dilatation, a 10 [place]ian all-purpose drainage catheter was advanced over the wire until the loop was formed within the gallbladder. The catheter was then locked in place. 80 mL of bile was then aspirated. The catheter was sutured at the skin. Contrast was injected, confirming satisfactory location of the tube. The tube was then connected to gravity bag, and dressed sterilely. Impression: Impression: Successful cholecystostomy tube placement. 85 cc of tan purulent fluid was aspirated and sent for analysis. Plan:  Tube to bag drainage. Flush tube with 10cc NSS qd. Return in 2 months for a tube check to assess cystic duct patency and confirm tube is still in satisfactory position. Tube will remain in place until surgery. Workstation performed: [id number]     Procedure: US right upper quadrant    Result Date: 7/28/2023  Narrative: RIGHT UPPER QUADRANT ULTRASOUND INDICATION:     cholecystitis. COMPARISON: CT 7/28/2023. TECHNIQUE:   Real-time ultrasound of the right upper quadrant was performed with a curvilinear transducer with both volumetric sweeps and still imaging techniques. FINDINGS: PANCREAS:  Visualized portions of the pancreas are within normal limits. AORTA AND IVC:  Visualized portions are normal for patient age. LIVER: Size:  Within normal range. The liver measures 15.2 cm in the midclavicular line. Contour:  Surface contour is smooth. Parenchyma:  Echogenicity and echotexture are within normal limits. No liver mass identified. 2.7 x 1.2 x 1.9 cm perihepatic cystic structure is identified also seen on CT. Limited imaging of the main portal vein shows it to be patent and hepatopetal. BILIARY: Gallbladder sludge is noted. Gallbladder wall thickening is present. No intrahepatic biliary dilatation. CBD measures 8.0 mm. No choledocholithiasis. KIDNEY: Right kidney measures 10.9 x 5.9 x 5.1 cm. Volume 173.4 mL Kidney within normal limits. ASCITES:   None. Impression: Cholelithiasis and gallbladder sludge with gallbladder wall thickening. Patient is medicated limiting evaluation for focal tenderness however findings suggest acute cholecystitis. Perihepatic cystic structure. Workstation performed: [id number]     Procedure: CT abdomen pelvis with contrast    Result Date: 7/28/2023  Narrative: CT ABDOMEN AND PELVIS WITH IV CONTRAST INDICATION:   RUQ pain. The patient reports a history of gallstones. COMPARISON:  None. TECHNIQUE:  CT examination of the abdomen and pelvis was performed. Multiplanar 2D reformatted images were created from the source data. This examination, like all CT scans performed in the [facility], was performed utilizing techniques to minimize radiation dose exposure, including the use of iterative reconstruction and automated exposure control. Radiation dose length product (DLP) for this visit:  477.43 mGy-cm IV Contrast:  100 mL of iohexol (OMNIPAQUE) Enteric Contrast:  Enteric contrast was not administered. FINDINGS: ABDOMEN LOWER CHEST:  No clinically significant abnormality identified in the visualized lower chest. LIVER/BILIARY TREE: Enlarged fatty liver noted. Apparent cyst along the lateral aspect of the right hepatic lobe GALLBLADDER: Gallbladder wall thickening with mild pericholecystic inflammatory change concerning for possible acute cholecystitis, without gallstones identified although the patient reports a history of same. SPLEEN:  Unremarkable. PANCREAS:  Unremarkable. ADRENAL GLANDS:  Unremarkable. KIDNEYS/URETERS:  Unremarkable. No hydronephrosis. STOMACH AND BOWEL:  Unremarkable. APPENDIX: A normal appendix was visualized. ABDOMINOPELVIC CAVITY:  No ascites. No pneumoperitoneum. No lymphadenopathy. VESSELS:  Unremarkable for patient's age. PELVIS REPRODUCTIVE ORGANS:  Unremarkable for patient's age. URINARY BLADDER:  Unremarkable. ABDOMINAL WALL/INGUINAL REGIONS:  Unremarkable. OSSEOUS STRUCTURES:  No acute fracture or destructive osseous lesion. Impression: Gallbladder wall thickening and pericholecystic inflammatory change concerning for acute cholecystitis. Although no choleliths are identified by CT technique, the patient reports a prior history of gallstones. I personally discussed this study with Kenny Cronin on 7/28/2023 12:55 PM. Workstation performed: [id number]         Pertinent notes reviewed    Counseling / Coordination of Care  Total Office time /unit time spent today 15minutes. Greater than 50% of total time was spent with the patient and / or family counseling and / or coordination of care. A description of the counseling / coordination of care:  I performed an interim history, pertinent images and labs, performed a physical examination to arrive at the plan delineated above with associated thought processes. Andreas Panda MD MS FRCS FACS  [facility]  08/24/23 5:32 PM        Portions of the record may have been created with voice recognition software. Occasional wrong word or "sound a like" substitutions may have occurred due to the inherent limitations of voice recognition software. Read the chart carefully and recognize, using context, where substitutions have occurred.

## 2023-08-25 ENCOUNTER — APPOINTMENT (OUTPATIENT)
Dept: LAB | Facility: HOSPITAL | Age: 48
End: 2023-08-25
Attending: SPECIALIST
Payer: COMMERCIAL

## 2023-08-25 ENCOUNTER — HOSPITAL ENCOUNTER (OUTPATIENT)
Dept: RADIOLOGY | Facility: HOSPITAL | Age: 48
Discharge: HOME | End: 2023-08-25
Attending: SPECIALIST
Payer: COMMERCIAL

## 2023-08-25 DIAGNOSIS — T85.518A CHOLECYSTOSTOMY TUBE DYSFUNCTION: ICD-10-CM

## 2023-08-25 DIAGNOSIS — K81.0 ACUTE CHOLECYSTITIS: ICD-10-CM

## 2023-08-25 DIAGNOSIS — E87.6 HYPOKALEMIA: ICD-10-CM

## 2023-08-25 LAB
ALBUMIN SERPL BCP-MCNC: 4.3 G/DL (ref 3.5–5)
ALP SERPL-CCNC: 81 U/L (ref 34–104)
ALT SERPL W P-5'-P-CCNC: 27 U/L (ref 7–52)
ANION GAP SERPL CALCULATED.3IONS-SCNC: 5 MMOL/L
AST SERPL W P-5'-P-CCNC: 23 U/L (ref 13–39)
BASOPHILS # BLD AUTO: 0.08 THOUSANDS/ÂΜL (ref 0–0.1)
BASOPHILS NFR BLD AUTO: 1 % (ref 0–1)
BILIRUB SERPL-MCNC: 0.29 MG/DL (ref 0.2–1)
BUN SERPL-MCNC: 12 MG/DL (ref 5–25)
CALCIUM SERPL-MCNC: 9.8 MG/DL (ref 8.4–10.2)
CHLORIDE SERPL-SCNC: 107 MMOL/L (ref 96–108)
CO2 SERPL-SCNC: 32 MMOL/L (ref 21–32)
CREAT SERPL-MCNC: 0.73 MG/DL (ref 0.6–1.3)
EOSINOPHIL # BLD AUTO: 0.53 THOUSAND/ÂΜL (ref 0–0.61)
EOSINOPHIL NFR BLD AUTO: 6 % (ref 0–6)
ERYTHROCYTE [DISTWIDTH] IN BLOOD BY AUTOMATED COUNT: 15.5 % (ref 11.6–15.1)
GFR SERPL CREATININE-BSD FRML MDRD: 111 ML/MIN/1.73SQ M
GLUCOSE P FAST SERPL-MCNC: 93 MG/DL (ref 65–99)
HCT VFR BLD AUTO: 45.7 % (ref 36.5–49.3)
HGB BLD-MCNC: 14.1 G/DL (ref 12–17)
IMM GRANULOCYTES # BLD AUTO: 0.03 THOUSAND/UL (ref 0–0.2)
IMM GRANULOCYTES NFR BLD AUTO: 0 % (ref 0–2)
LYMPHOCYTES # BLD AUTO: 2.52 THOUSANDS/ÂΜL (ref 0.6–4.47)
LYMPHOCYTES NFR BLD AUTO: 28 % (ref 14–44)
MCH RBC QN AUTO: 27.7 PG (ref 26.8–34.3)
MCHC RBC AUTO-ENTMCNC: 30.9 G/DL (ref 31.4–37.4)
MCV RBC AUTO: 90 FL (ref 82–98)
MONOCYTES # BLD AUTO: 0.66 THOUSAND/ÂΜL (ref 0.17–1.22)
MONOCYTES NFR BLD AUTO: 7 % (ref 4–12)
NEUTROPHILS # BLD AUTO: 5.19 THOUSANDS/ÂΜL (ref 1.85–7.62)
NEUTS SEG NFR BLD AUTO: 58 % (ref 43–75)
NRBC BLD AUTO-RTO: 0 /100 WBCS
PLATELET # BLD AUTO: 252 THOUSANDS/UL (ref 149–390)
PMV BLD AUTO: 10.3 FL (ref 8.9–12.7)
POTASSIUM SERPL-SCNC: 4.7 MMOL/L (ref 3.5–5.3)
PROT SERPL-MCNC: 7.2 G/DL (ref 6.4–8.4)
RBC # BLD AUTO: 5.09 MILLION/UL (ref 3.88–5.62)
SODIUM SERPL-SCNC: 144 MMOL/L (ref 135–147)
WBC # BLD AUTO: 9.01 THOUSAND/UL (ref 4.31–10.16)

## 2023-08-25 PROCEDURE — 85025 COMPLETE CBC W/AUTO DIFF WBC: CPT

## 2023-08-25 PROCEDURE — 36415 COLL VENOUS BLD VENIPUNCTURE: CPT

## 2023-08-25 PROCEDURE — 76705 ECHO EXAM OF ABDOMEN: CPT

## 2023-08-25 PROCEDURE — 80053 COMPREHEN METABOLIC PANEL: CPT

## 2023-09-05 ENCOUNTER — TELEPHONE (OUTPATIENT)
Dept: SURGERY | Facility: CLINIC | Age: 48
End: 2023-09-05

## 2023-09-05 DIAGNOSIS — K81.0 ACUTE CHOLECYSTITIS: ICD-10-CM

## 2023-09-05 RX ORDER — SODIUM CHLORIDE 9 MG/ML
5 INJECTION INTRAVENOUS EVERY 12 HOURS
Qty: 250 ML | Refills: 0 | Status: SHIPPED | OUTPATIENT
Start: 2023-09-05

## 2023-09-07 ENCOUNTER — OFFICE VISIT (OUTPATIENT)
Dept: SURGERY | Facility: CLINIC | Age: 48
End: 2023-09-07
Payer: COMMERCIAL

## 2023-09-07 VITALS
DIASTOLIC BLOOD PRESSURE: 79 MMHG | SYSTOLIC BLOOD PRESSURE: 112 MMHG | OXYGEN SATURATION: 97 % | BODY MASS INDEX: 23.33 KG/M2 | TEMPERATURE: 98.2 F | WEIGHT: 176 LBS | HEART RATE: 97 BPM | HEIGHT: 73 IN

## 2023-09-07 DIAGNOSIS — T85.518A CHOLECYSTOSTOMY TUBE DYSFUNCTION: ICD-10-CM

## 2023-09-07 DIAGNOSIS — K81.0 ACUTE CHOLECYSTITIS: Primary | ICD-10-CM

## 2023-09-07 PROCEDURE — 99214 OFFICE O/P EST MOD 30 MIN: CPT | Performed by: SPECIALIST

## 2023-09-07 NOTE — H&P (VIEW-ONLY)
General Surgery Office Visit Follow up   [facility]  Patient: Puneet Perez   : 1975 Sex: male MRN: [id number]   CSN: [id number] PCP: Roverto Junior MD    Assessment/ Plan:  Puneet Perez is a 57 y.o. male  day(s) POD #6 weeks s/p cystostomy tube placement  Acute cholecystitis [K81.0]    Plan  Stable postop   In view Of normal ultrasound normal LFTs no gallstones  Possible removal of cholecystostomy tube    Discussed patient in detail all question answered his satisfaction patient was made aware that there is no gallstones and probably was not a calculus cholecystitis which got better with antibiotics and cholecystostomy tube    If patient develops symptoms after removal of cholecystostomy tube or develop gallstones may require cholecystectomy    Is given option to go ahead and do cholecystectomy  Or remove the tube and there discharge that patient may not develop any symptoms does not require any surgery for gallbladder     Patient wants to proceed with removal of cholecystostomy tube as the risk if develop symptoms may require cholecystectomy  IR consult placed    SUBJECTIVE:   Very well when can I get my to removed  OBJECTIVE:  No complaints  Minimal incisional pain cystostomy tube site  No fever no chills no rigors  Tolerating p.o.  Diet well  Normal bowel movement no constipation or diarrhea  Ambulating well   Vitals:   /79 (BP Location: Left arm, Patient Position: Sitting, Cuff Size: Large)   Pulse 97   Temp 98.2 °F (36.8 °C) (Core)   Ht 6' 1" (1.854 m)   Wt 79.8 kg (176 lb)   SpO2 97%   BMI 23.22 kg/m²     Active medications:    Current Outpatient Medications:   •  albuterol (PROVENTIL HFA,VENTOLIN HFA) 90 mcg/act inhaler, INL 2 PFS PO Q 4 H PRF WHZ, Disp: , Rfl: 3  •  ALPRAZolam (XANAX) 1 mg tablet, TAKE 1 TABLET BY MOUTH THREE TIMES A DAY AS NEEDED, Disp: , Rfl:   •  potassium-sodium phosphateS (K-PHOS,PHOSPHA 250) 155-852-130 mg tablet, Take 1 tablet by mouth 4 (four) times a day (with meals and at bedtime), Disp: , Rfl: 0  •  sodium chloride, PF, 0.9 %, Inject 5 mL into a catheter in a vein every 12 (twelve) hours, Disp: 250 mL, Rfl: 0  •  Anoro Ellipta 62.5-25 MCG/INH inhaler, INHALE 1 PUFF BY INHALATION ROUTE EVERY DAY AT THE SAME TIME EACH DAY (Patient not taking: Reported on 7/28/2023), Disp: , Rfl:   •  sodium chloride, PF, 0.9 %, 10 mL by Intracatheter route daily Intracatheter flushing daily. May substitute prefilled syringe with normal saline 10 mL vials, 10 mL syringes, and 18 g blunt needles (Patient not taking: Reported on 8/24/2023), Disp: 900 mL, Rfl: 0    Physical Exam:   General Alert awake   Normocephalic atraumatic PERRLA   Lungs clear bilaterally  Cardiac normal S1 normal S2  Abdomen soft,non tender Bowel sounds present  Skin: surgical dressing is C/D/I's redness over the tape site  Ext: No clubbing, cyanosis, edema    Visit Diagnosis: . Diagnoses and all orders for this visit:    Acute cholecystitis  -     Ambulatory Referral to Interventional Radiology; Future    Cholecystostomy tube dysfunction  -     Ambulatory Referral to Interventional Radiology;  Future       Plan of care was discussed with patient in detail    Pertinent labs reviewed  Most Recent Labs:   Appointment on 08/25/2023   Component Date Value Ref Range Status   • WBC 08/25/2023 9.01  4.31 - 10.16 Thousand/uL Final   • RBC 08/25/2023 5.09  3.88 - 5.62 Million/uL Final   • Hemoglobin 08/25/2023 14.1  12.0 - 17.0 g/dL Final   • Hematocrit 08/25/2023 45.7  36.5 - 49.3 % Final   • MCV 08/25/2023 90  82 - 98 fL Final   • MCH 08/25/2023 27.7  26.8 - 34.3 pg Final   • MCHC 08/25/2023 30.9 (L)  31.4 - 37.4 g/dL Final   • RDW 08/25/2023 15.5 (H)  11.6 - 15.1 % Final   • MPV 08/25/2023 10.3  8.9 - 12.7 fL Final   • Platelets 88/50/4872 252  149 - 390 Thousands/uL Final   • nRBC 08/25/2023 0  /100 WBCs Final   • Neutrophils Relative 08/25/2023 58  43 - 75 % Final   • Immat GRANS % 08/25/2023 0  0 - 2 % Final   • Lymphocytes Relative 08/25/2023 28  14 - 44 % Final   • Monocytes Relative 08/25/2023 7  4 - 12 % Final   • Eosinophils Relative 08/25/2023 6  0 - 6 % Final   • Basophils Relative 08/25/2023 1  0 - 1 % Final   • Neutrophils Absolute 08/25/2023 5.19  1.85 - 7.62 Thousands/µL Final   • Immature Grans Absolute 08/25/2023 0.03  0.00 - 0.20 Thousand/uL Final   • Lymphocytes Absolute 08/25/2023 2.52  0.60 - 4.47 Thousands/µL Final   • Monocytes Absolute 08/25/2023 0.66  0.17 - 1.22 Thousand/µL Final   • Eosinophils Absolute 08/25/2023 0.53  0.00 - 0.61 Thousand/µL Final   • Basophils Absolute 08/25/2023 0.08  0.00 - 0.10 Thousands/µL Final   • Sodium 08/25/2023 144  135 - 147 mmol/L Final   • Potassium 08/25/2023 4.7  3.5 - 5.3 mmol/L Final   • Chloride 08/25/2023 107  96 - 108 mmol/L Final   • CO2 08/25/2023 32  21 - 32 mmol/L Final   • ANION GAP 08/25/2023 5  mmol/L Final   • BUN 08/25/2023 12  5 - 25 mg/dL Final   • Creatinine 08/25/2023 0.73  0.60 - 1.30 mg/dL Final    Standardized to IDMS reference method   • Glucose, Fasting 08/25/2023 93  65 - 99 mg/dL Final   • Calcium 08/25/2023 9.8  8.4 - 10.2 mg/dL Final   • AST 08/25/2023 23  13 - 39 U/L Final   • ALT 08/25/2023 27  7 - 52 U/L Final    Specimen collection should occur prior to Sulfasalazine administration due to the potential for falsely depressed results. • Alkaline Phosphatase 08/25/2023 81  34 - 104 U/L Final   • Total Protein 08/25/2023 7.2  6.4 - 8.4 g/dL Final   • Albumin 08/25/2023 4.3  3.5 - 5.0 g/dL Final   • Total Bilirubin 08/25/2023 0.29  0.20 - 1.00 mg/dL Final    Use of this assay is not recommended for patients undergoing treatment with eltrombopag due to the potential for falsely elevated results. N-acetyl-p-benzoquinone imine (metabolite of Acetaminophen) will generate erroneously low results in samples for patients that have taken an overdose of Acetaminophen.    • eGFR 08/25/2023 111  ml/min/1.73sq m Final Pertinent images and available reads personally reviewed  Procedure: US abdomen limited    Result Date: 9/1/2023  Narrative: RIGHT UPPER QUADRANT ULTRASOUND INDICATION:    K81.0: Acute cholecystitis T85.518A: Breakdown (mechanical) of other gastrointestinal prosthetic devices, implants and grafts, initial encounter. COMPARISON: Abdominal ultrasound July 28, 2023, CT abdomen and pelvis July 28, 2023 TECHNIQUE:   Real-time ultrasound of the right upper quadrant was performed with a curvilinear transducer with both volumetric sweeps and still imaging techniques. FINDINGS: PANCREAS: Portions of the pancreas are obscured by bowel gas. Visualized portions of the pancreas are unremarkable. AORTA AND IVC:  Visualized portions are normal for patient age. LIVER: Size:  Within normal range. The liver measures 12.9 cm in the midclavicular line. Contour:  Surface contour is smooth. Parenchyma:  Echogenicity and echotexture are within normal limits. Limited imaging of the main portal vein shows it to be patent and hepatopetal. 2.9 x 2.1 x 1.6 cm cyst is seen lateral to the right lobe of the liver. BILIARY: Gallbladder is contracted. Cholecystostomy tube is seen. Alexandra's sign is negative. Previously seen gallstones are not visualized. No intrahepatic biliary dilatation. CBD measures 5.0 mm. No choledocholithiasis. KIDNEY: Right kidney measures 10.8 SAG x 5.4 AP x 6.2 TRV cm. Volume 189.5 mL Kidney within normal limits. ASCITES:   None. Impression: Contracted gallbladder, containing a cholecystostomy tube. 2.9 cm cyst lateral to the right lobe of the liver.  Workstation performed: [id number]     Procedure: IR cholecystostomy tube placement    Result Date: 7/28/2023  Narrative: Cholecystostomy tube placement under ultrasound and fluoro Clinical History:  Cholecystitis Contrast: 10 cc of Omnipaque 350 Fluoro time: 1.0 Radiation dose: 30 mGy Number of Images: 5 Conscious sedation time: 15 min Technique: The patient was brought to the interventional radiology suite and placed supine on the table. The skin of the right upper quadrant was prepped and draped in the usual sterile fashion. After local anesthesia was administered to the skin, a 19 gauge needle was advanced percutaneously under direct ultrasound guidance into the gallbladder via a transhepatic route. 5 mL of tan purulent bile was removed and sent for culture and sensitivity with gram stain. A heavy duty wire was advanced through the needle, and the needle was removed. After tract dilatation, a 10 Persian all-purpose drainage catheter was advanced over the wire until the loop was formed within the gallbladder. The catheter was then locked in place. 80 mL of bile was then aspirated. The catheter was sutured at the skin. Contrast was injected, confirming satisfactory location of the tube. The tube was then connected to gravity bag, and dressed sterilely. Impression: Impression: Successful cholecystostomy tube placement. 85 cc of tan purulent fluid was aspirated and sent for analysis. Plan:  Tube to bag drainage. Flush tube with 10cc NSS qd. Return in 2 months for a tube check to assess cystic duct patency and confirm tube is still in satisfactory position. Tube will remain in place until surgery. Workstation performed: [id number]     Procedure: US right upper quadrant    Result Date: 7/28/2023  Narrative: RIGHT UPPER QUADRANT ULTRASOUND INDICATION:     cholecystitis. COMPARISON: CT 7/28/2023. TECHNIQUE:   Real-time ultrasound of the right upper quadrant was performed with a curvilinear transducer with both volumetric sweeps and still imaging techniques. FINDINGS: PANCREAS:  Visualized portions of the pancreas are within normal limits. AORTA AND IVC:  Visualized portions are normal for patient age. LIVER: Size:  Within normal range. The liver measures 15.2 cm in the midclavicular line. Contour:  Surface contour is smooth.  Parenchyma:  Echogenicity and echotexture are within normal limits. No liver mass identified. 2.7 x 1.2 x 1.9 cm perihepatic cystic structure is identified also seen on CT. Limited imaging of the main portal vein shows it to be patent and hepatopetal. BILIARY: Gallbladder sludge is noted. Gallbladder wall thickening is present. No intrahepatic biliary dilatation. CBD measures 8.0 mm. No choledocholithiasis. KIDNEY: Right kidney measures 10.9 x 5.9 x 5.1 cm. Volume 173.4 mL Kidney within normal limits. ASCITES:   None. Impression: Cholelithiasis and gallbladder sludge with gallbladder wall thickening. Patient is medicated limiting evaluation for focal tenderness however findings suggest acute cholecystitis. Perihepatic cystic structure. Workstation performed: [id number]     Procedure: CT abdomen pelvis with contrast    Result Date: 7/28/2023  Narrative: CT ABDOMEN AND PELVIS WITH IV CONTRAST INDICATION:   RUQ pain. The patient reports a history of gallstones. COMPARISON:  None. TECHNIQUE:  CT examination of the abdomen and pelvis was performed. Multiplanar 2D reformatted images were created from the source data. This examination, like all CT scans performed in the [facility], was performed utilizing techniques to minimize radiation dose exposure, including the use of iterative reconstruction and automated exposure control. Radiation dose length product (DLP) for this visit:  477.43 mGy-cm IV Contrast:  100 mL of iohexol (OMNIPAQUE) Enteric Contrast:  Enteric contrast was not administered. FINDINGS: ABDOMEN LOWER CHEST:  No clinically significant abnormality identified in the visualized lower chest. LIVER/BILIARY TREE: Enlarged fatty liver noted. Apparent cyst along the lateral aspect of the right hepatic lobe GALLBLADDER: Gallbladder wall thickening with mild pericholecystic inflammatory change concerning for possible acute cholecystitis, without gallstones identified although the patient reports a history of same.  SPLEEN: Unremarkable. PANCREAS:  Unremarkable. ADRENAL GLANDS:  Unremarkable. KIDNEYS/URETERS:  Unremarkable. No hydronephrosis. STOMACH AND BOWEL:  Unremarkable. APPENDIX: A normal appendix was visualized. ABDOMINOPELVIC CAVITY:  No ascites. No pneumoperitoneum. No lymphadenopathy. VESSELS:  Unremarkable for patient's age. PELVIS REPRODUCTIVE ORGANS:  Unremarkable for patient's age. URINARY BLADDER:  Unremarkable. ABDOMINAL WALL/INGUINAL REGIONS:  Unremarkable. OSSEOUS STRUCTURES:  No acute fracture or destructive osseous lesion. Impression: Gallbladder wall thickening and pericholecystic inflammatory change concerning for acute cholecystitis. Although no choleliths are identified by CT technique, the patient reports a prior history of gallstones. I personally discussed this study with Kenny Cronin on 7/28/2023 12:55 PM. Workstation performed: [id number]     Pertinent notes reviewed    Counseling / Coordination of Care  Total Office time /unit time spent today 15minutes. Greater than 50% of total time was spent with the patient and / or family counseling and / or coordination of care. A description of the counseling / coordination of care:  I performed an interim history, pertinent images and labs, performed a physical examination to arrive at the plan delineated above with associated thought processes. Charo Osuna MD MS FRCS FACS  [facility]  09/07/23 3:29 PM        Portions of the record may have been created with voice recognition software. Occasional wrong word or "sound a like" substitutions may have occurred due to the inherent limitations of voice recognition software. Read the chart carefully and recognize, using context, where substitutions have occurred.

## 2023-09-07 NOTE — PROGRESS NOTES
General Surgery Office Visit Follow up   [facility]  Patient: Isidoro Christie   : 1975 Sex: male MRN: [id number]   CSN: [id number] PCP: Marcelo Fairchild MD    Assessment/ Plan:  Isidoro Christie is a 57 y.o. male  day(s) POD #6 weeks s/p cystostomy tube placement  Acute cholecystitis [K81.0]    Plan  Stable postop   In view Of normal ultrasound normal LFTs no gallstones  Possible removal of cholecystostomy tube    Discussed patient in detail all question answered his satisfaction patient was made aware that there is no gallstones and probably was not a calculus cholecystitis which got better with antibiotics and cholecystostomy tube    If patient develops symptoms after removal of cholecystostomy tube or develop gallstones may require cholecystectomy    Is given option to go ahead and do cholecystectomy  Or remove the tube and there discharge that patient may not develop any symptoms does not require any surgery for gallbladder     Patient wants to proceed with removal of cholecystostomy tube as the risk if develop symptoms may require cholecystectomy  IR consult placed    SUBJECTIVE:   Very well when can I get my to removed  OBJECTIVE:  No complaints  Minimal incisional pain cystostomy tube site  No fever no chills no rigors  Tolerating p.o.  Diet well  Normal bowel movement no constipation or diarrhea  Ambulating well   Vitals:   /79 (BP Location: Left arm, Patient Position: Sitting, Cuff Size: Large)   Pulse 97   Temp 98.2 °F (36.8 °C) (Core)   Ht 6' 1" (1.854 m)   Wt 79.8 kg (176 lb)   SpO2 97%   BMI 23.22 kg/m²     Active medications:    Current Outpatient Medications:   •  albuterol (PROVENTIL HFA,VENTOLIN HFA) 90 mcg/act inhaler, INL 2 PFS PO Q 4 H PRF WHZ, Disp: , Rfl: 3  •  ALPRAZolam (XANAX) 1 mg tablet, TAKE 1 TABLET BY MOUTH THREE TIMES A DAY AS NEEDED, Disp: , Rfl:   •  potassium-sodium phosphateS (K-PHOS,PHOSPHA 250) 155-852-130 mg tablet, Take 1 tablet by mouth 4 (four) times a day (with meals and at bedtime), Disp: , Rfl: 0  •  sodium chloride, PF, 0.9 %, Inject 5 mL into a catheter in a vein every 12 (twelve) hours, Disp: 250 mL, Rfl: 0  •  Anoro Ellipta 62.5-25 MCG/INH inhaler, INHALE 1 PUFF BY INHALATION ROUTE EVERY DAY AT THE SAME TIME EACH DAY (Patient not taking: Reported on 7/28/2023), Disp: , Rfl:   •  sodium chloride, PF, 0.9 %, 10 mL by Intracatheter route daily Intracatheter flushing daily. May substitute prefilled syringe with normal saline 10 mL vials, 10 mL syringes, and 18 g blunt needles (Patient not taking: Reported on 8/24/2023), Disp: 900 mL, Rfl: 0    Physical Exam:   General Alert awake   Normocephalic atraumatic PERRLA   Lungs clear bilaterally  Cardiac normal S1 normal S2  Abdomen soft,non tender Bowel sounds present  Skin: surgical dressing is C/D/I's redness over the tape site  Ext: No clubbing, cyanosis, edema    Visit Diagnosis: . Diagnoses and all orders for this visit:    Acute cholecystitis  -     Ambulatory Referral to Interventional Radiology; Future    Cholecystostomy tube dysfunction  -     Ambulatory Referral to Interventional Radiology;  Future       Plan of care was discussed with patient in detail    Pertinent labs reviewed  Most Recent Labs:   Appointment on 08/25/2023   Component Date Value Ref Range Status   • WBC 08/25/2023 9.01  4.31 - 10.16 Thousand/uL Final   • RBC 08/25/2023 5.09  3.88 - 5.62 Million/uL Final   • Hemoglobin 08/25/2023 14.1  12.0 - 17.0 g/dL Final   • Hematocrit 08/25/2023 45.7  36.5 - 49.3 % Final   • MCV 08/25/2023 90  82 - 98 fL Final   • MCH 08/25/2023 27.7  26.8 - 34.3 pg Final   • MCHC 08/25/2023 30.9 (L)  31.4 - 37.4 g/dL Final   • RDW 08/25/2023 15.5 (H)  11.6 - 15.1 % Final   • MPV 08/25/2023 10.3  8.9 - 12.7 fL Final   • Platelets 17/13/2393 252  149 - 390 Thousands/uL Final   • nRBC 08/25/2023 0  /100 WBCs Final   • Neutrophils Relative 08/25/2023 58  43 - 75 % Final   • Immat GRANS % 08/25/2023 0  0 - 2 % Final   • Lymphocytes Relative 08/25/2023 28  14 - 44 % Final   • Monocytes Relative 08/25/2023 7  4 - 12 % Final   • Eosinophils Relative 08/25/2023 6  0 - 6 % Final   • Basophils Relative 08/25/2023 1  0 - 1 % Final   • Neutrophils Absolute 08/25/2023 5.19  1.85 - 7.62 Thousands/µL Final   • Immature Grans Absolute 08/25/2023 0.03  0.00 - 0.20 Thousand/uL Final   • Lymphocytes Absolute 08/25/2023 2.52  0.60 - 4.47 Thousands/µL Final   • Monocytes Absolute 08/25/2023 0.66  0.17 - 1.22 Thousand/µL Final   • Eosinophils Absolute 08/25/2023 0.53  0.00 - 0.61 Thousand/µL Final   • Basophils Absolute 08/25/2023 0.08  0.00 - 0.10 Thousands/µL Final   • Sodium 08/25/2023 144  135 - 147 mmol/L Final   • Potassium 08/25/2023 4.7  3.5 - 5.3 mmol/L Final   • Chloride 08/25/2023 107  96 - 108 mmol/L Final   • CO2 08/25/2023 32  21 - 32 mmol/L Final   • ANION GAP 08/25/2023 5  mmol/L Final   • BUN 08/25/2023 12  5 - 25 mg/dL Final   • Creatinine 08/25/2023 0.73  0.60 - 1.30 mg/dL Final    Standardized to IDMS reference method   • Glucose, Fasting 08/25/2023 93  65 - 99 mg/dL Final   • Calcium 08/25/2023 9.8  8.4 - 10.2 mg/dL Final   • AST 08/25/2023 23  13 - 39 U/L Final   • ALT 08/25/2023 27  7 - 52 U/L Final    Specimen collection should occur prior to Sulfasalazine administration due to the potential for falsely depressed results. • Alkaline Phosphatase 08/25/2023 81  34 - 104 U/L Final   • Total Protein 08/25/2023 7.2  6.4 - 8.4 g/dL Final   • Albumin 08/25/2023 4.3  3.5 - 5.0 g/dL Final   • Total Bilirubin 08/25/2023 0.29  0.20 - 1.00 mg/dL Final    Use of this assay is not recommended for patients undergoing treatment with eltrombopag due to the potential for falsely elevated results. N-acetyl-p-benzoquinone imine (metabolite of Acetaminophen) will generate erroneously low results in samples for patients that have taken an overdose of Acetaminophen.    • eGFR 08/25/2023 111  ml/min/1.73sq m Final Pertinent images and available reads personally reviewed  Procedure: US abdomen limited    Result Date: 9/1/2023  Narrative: RIGHT UPPER QUADRANT ULTRASOUND INDICATION:    K81.0: Acute cholecystitis T85.518A: Breakdown (mechanical) of other gastrointestinal prosthetic devices, implants and grafts, initial encounter. COMPARISON: Abdominal ultrasound July 28, 2023, CT abdomen and pelvis July 28, 2023 TECHNIQUE:   Real-time ultrasound of the right upper quadrant was performed with a curvilinear transducer with both volumetric sweeps and still imaging techniques. FINDINGS: PANCREAS: Portions of the pancreas are obscured by bowel gas. Visualized portions of the pancreas are unremarkable. AORTA AND IVC:  Visualized portions are normal for patient age. LIVER: Size:  Within normal range. The liver measures 12.9 cm in the midclavicular line. Contour:  Surface contour is smooth. Parenchyma:  Echogenicity and echotexture are within normal limits. Limited imaging of the main portal vein shows it to be patent and hepatopetal. 2.9 x 2.1 x 1.6 cm cyst is seen lateral to the right lobe of the liver. BILIARY: Gallbladder is contracted. Cholecystostomy tube is seen. Alexandra's sign is negative. Previously seen gallstones are not visualized. No intrahepatic biliary dilatation. CBD measures 5.0 mm. No choledocholithiasis. KIDNEY: Right kidney measures 10.8 SAG x 5.4 AP x 6.2 TRV cm. Volume 189.5 mL Kidney within normal limits. ASCITES:   None. Impression: Contracted gallbladder, containing a cholecystostomy tube. 2.9 cm cyst lateral to the right lobe of the liver.  Workstation performed: [id number]     Procedure: IR cholecystostomy tube placement    Result Date: 7/28/2023  Narrative: Cholecystostomy tube placement under ultrasound and fluoro Clinical History:  Cholecystitis Contrast: 10 cc of Omnipaque 350 Fluoro time: 1.0 Radiation dose: 30 mGy Number of Images: 5 Conscious sedation time: 15 min Technique: The patient was brought to the interventional radiology suite and placed supine on the table. The skin of the right upper quadrant was prepped and draped in the usual sterile fashion. After local anesthesia was administered to the skin, a 19 gauge needle was advanced percutaneously under direct ultrasound guidance into the gallbladder via a transhepatic route. 5 mL of tan purulent bile was removed and sent for culture and sensitivity with gram stain. A heavy duty wire was advanced through the needle, and the needle was removed. After tract dilatation, a 10 Macedonian all-purpose drainage catheter was advanced over the wire until the loop was formed within the gallbladder. The catheter was then locked in place. 80 mL of bile was then aspirated. The catheter was sutured at the skin. Contrast was injected, confirming satisfactory location of the tube. The tube was then connected to gravity bag, and dressed sterilely. Impression: Impression: Successful cholecystostomy tube placement. 85 cc of tan purulent fluid was aspirated and sent for analysis. Plan:  Tube to bag drainage. Flush tube with 10cc NSS qd. Return in 2 months for a tube check to assess cystic duct patency and confirm tube is still in satisfactory position. Tube will remain in place until surgery. Workstation performed: [id number]     Procedure: US right upper quadrant    Result Date: 7/28/2023  Narrative: RIGHT UPPER QUADRANT ULTRASOUND INDICATION:     cholecystitis. COMPARISON: CT 7/28/2023. TECHNIQUE:   Real-time ultrasound of the right upper quadrant was performed with a curvilinear transducer with both volumetric sweeps and still imaging techniques. FINDINGS: PANCREAS:  Visualized portions of the pancreas are within normal limits. AORTA AND IVC:  Visualized portions are normal for patient age. LIVER: Size:  Within normal range. The liver measures 15.2 cm in the midclavicular line. Contour:  Surface contour is smooth.  Parenchyma:  Echogenicity and echotexture are within normal limits. No liver mass identified. 2.7 x 1.2 x 1.9 cm perihepatic cystic structure is identified also seen on CT. Limited imaging of the main portal vein shows it to be patent and hepatopetal. BILIARY: Gallbladder sludge is noted. Gallbladder wall thickening is present. No intrahepatic biliary dilatation. CBD measures 8.0 mm. No choledocholithiasis. KIDNEY: Right kidney measures 10.9 x 5.9 x 5.1 cm. Volume 173.4 mL Kidney within normal limits. ASCITES:   None. Impression: Cholelithiasis and gallbladder sludge with gallbladder wall thickening. Patient is medicated limiting evaluation for focal tenderness however findings suggest acute cholecystitis. Perihepatic cystic structure. Workstation performed: [id number]     Procedure: CT abdomen pelvis with contrast    Result Date: 7/28/2023  Narrative: CT ABDOMEN AND PELVIS WITH IV CONTRAST INDICATION:   RUQ pain. The patient reports a history of gallstones. COMPARISON:  None. TECHNIQUE:  CT examination of the abdomen and pelvis was performed. Multiplanar 2D reformatted images were created from the source data. This examination, like all CT scans performed in the [facility], was performed utilizing techniques to minimize radiation dose exposure, including the use of iterative reconstruction and automated exposure control. Radiation dose length product (DLP) for this visit:  477.43 mGy-cm IV Contrast:  100 mL of iohexol (OMNIPAQUE) Enteric Contrast:  Enteric contrast was not administered. FINDINGS: ABDOMEN LOWER CHEST:  No clinically significant abnormality identified in the visualized lower chest. LIVER/BILIARY TREE: Enlarged fatty liver noted. Apparent cyst along the lateral aspect of the right hepatic lobe GALLBLADDER: Gallbladder wall thickening with mild pericholecystic inflammatory change concerning for possible acute cholecystitis, without gallstones identified although the patient reports a history of same.  SPLEEN: Unremarkable. PANCREAS:  Unremarkable. ADRENAL GLANDS:  Unremarkable. KIDNEYS/URETERS:  Unremarkable. No hydronephrosis. STOMACH AND BOWEL:  Unremarkable. APPENDIX: A normal appendix was visualized. ABDOMINOPELVIC CAVITY:  No ascites. No pneumoperitoneum. No lymphadenopathy. VESSELS:  Unremarkable for patient's age. PELVIS REPRODUCTIVE ORGANS:  Unremarkable for patient's age. URINARY BLADDER:  Unremarkable. ABDOMINAL WALL/INGUINAL REGIONS:  Unremarkable. OSSEOUS STRUCTURES:  No acute fracture or destructive osseous lesion. Impression: Gallbladder wall thickening and pericholecystic inflammatory change concerning for acute cholecystitis. Although no choleliths are identified by CT technique, the patient reports a prior history of gallstones. I personally discussed this study with Kenny Cronin on 7/28/2023 12:55 PM. Workstation performed: [id number]     Pertinent notes reviewed    Counseling / Coordination of Care  Total Office time /unit time spent today 15minutes. Greater than 50% of total time was spent with the patient and / or family counseling and / or coordination of care. A description of the counseling / coordination of care:  I performed an interim history, pertinent images and labs, performed a physical examination to arrive at the plan delineated above with associated thought processes. Rosana Serrano MD MS FRCS FACS  [facility]  09/07/23 3:29 PM        Portions of the record may have been created with voice recognition software. Occasional wrong word or "sound a like" substitutions may have occurred due to the inherent limitations of voice recognition software. Read the chart carefully and recognize, using context, where substitutions have occurred.

## 2023-09-11 ENCOUNTER — HOSPITAL ENCOUNTER (OUTPATIENT)
Dept: NON INVASIVE DIAGNOSTICS | Facility: HOSPITAL | Age: 48
Discharge: HOME | End: 2023-09-11
Attending: RADIOLOGY
Payer: COMMERCIAL

## 2023-09-11 DIAGNOSIS — K81.0 ACUTE CHOLECYSTITIS: Primary | ICD-10-CM

## 2023-09-11 PROCEDURE — 47531 INJECTION FOR CHOLANGIOGRAM: CPT

## 2023-09-11 PROCEDURE — 47531 INJECTION FOR CHOLANGIOGRAM: CPT | Performed by: INTERNAL MEDICINE

## 2023-09-11 RX ADMIN — IOHEXOL 3 ML: 350 INJECTION, SOLUTION INTRAVENOUS at 13:37

## 2023-09-11 NOTE — DISCHARGE INSTRUCTIONS
TUBE CARE INSTRUCTIONS    Care after your procedure:    Resume your normal diet. Small sips of flat soda will help with nausea. 1. The properly functioning catheter should be forward flushed once (1x) daily with 10ml of normal saline using clean technique. You will be given a prescription for flushes. To flush the tube, clean both connections with alcohol swab. Twist off the drainage bag/ bulb  tubing and twist the saline syringe into the drainage tube and flush. Remove the syringe and twist the drainage bag / bulb tubing tubing back on.    2. The drainage bag/bulb may be emptied as necessary. Keep a record of the amount of fluid you drain from your tube. This should be done with clean technique as well. 3. A fresh dressing should be applied daily over the tube insertion site. 4. As the tube is secured to the skin with only a suture,try not to pull on your tube. Tub baths are not permitted. Showers are permitted if the patient's skin entry site is prevented from getting wet. Similarly, washcloth "baths" are acceptable. Contact Interventional Radiology at [phone] Wendy PATIENTS: Contact Interventional Radiology at [phone]) Barbara Rosana PATIENTS: Contact Interventional Radiology at [phone]) if:    1. Leakage or large amounts of liquid around the catheter. 2. Fever of 101 degrees lasting several hours without other obvious cause (such as sore throat, flu, etc). 3. Persistent nausea or vomiting. 4. Diminished drainage, which may be associated with pressure or pain. Or when the     drainage from your tube is less than 10mls for 48 hours. 5. Catheter pulled back or falls out. The following pharmacies carry the flush syringes.        [address] [place]  Phone [phone]            Phone [phone] [facility]                                [phone]  [address] [place]  Phone [phone]            Phone [phone]                      [facility]                                                                                                          [phone]  [facility]  [address] [place] [address].   [address]                                                                               [address] [facility]  Phone [phone]

## 2023-09-11 NOTE — SEDATION DOCUMENTATION
After conversation with Dr. Jem Barron, decision was made to keep tube in place. Bag reattached to tube, education provided to pt. Keep flushing per prior instruction. Pt to follow-up with surgery.

## 2023-09-11 NOTE — BRIEF OP NOTE (RAD/CATH)
INTERVENTIONAL RADIOLOGY PROCEDURE NOTE    Date: 9/11/2023    Procedure:   Procedure Summary     Date: 09/11/23 Room / Location: [address] Cardiac Cath Lab    Anesthesia Start:  Anesthesia Stop:     Procedure: IR CHOLECYSTOSTOMY TUBE CHECK/CHANGE/REPOSITION/REINSERTION/UPSIZE Diagnosis:       Acute cholecystitis      (Cholecystostomy tube placed 7/28 for 2 month tube check)    Scheduled Providers:  Responsible Provider:     Anesthesia Type: Not recorded ASA Status: Not recorded          Preoperative diagnosis:   1. Acute cholecystitis         Postoperative diagnosis: Same. Surgeon: Mumtaz Peña MD     Assistant: None. No qualified resident was available. Blood loss: None    Specimens: None     Findings: Injection of the cholecystostomy tube showed that the catheter tip was in the gallbladder, however with no patent cystic duct visualized. Given these findings, the patient was instructed to continue bag drainage of the tube and follow-up with surgery for cholecystectomy. We will plan for routine exchange in 3 months if catheter remains in place. Complications: None immediate.     Anesthesia: none

## 2023-09-18 ENCOUNTER — NURSE TRIAGE (OUTPATIENT)
Dept: OTHER | Facility: OTHER | Age: 48
End: 2023-09-18

## 2023-09-18 NOTE — TELEPHONE ENCOUNTER
Regarding: Surgery was cancelled for tomorrow, needs to know why  ----- Message from [facility] sent at 9/18/2023  6:13 PM EDT -----  " I am supposed to have surgery tomorrow with Dr. Tenisha Brenner, but it was cancelled, I need to know why, and what's going on."

## 2023-09-18 NOTE — TELEPHONE ENCOUNTER
Reason for Disposition  • Caller has already spoken with the PCP and has no further questions.     Protocols used: NO CONTACT OR DUPLICATE CONTACT CALL-ADULT-

## 2023-09-20 DIAGNOSIS — T85.518A CHOLECYSTOSTOMY TUBE DYSFUNCTION, INITIAL ENCOUNTER: Primary | ICD-10-CM

## 2023-09-20 RX ORDER — SODIUM CHLORIDE 9 MG/ML
5 INJECTION INTRAVENOUS EVERY 12 HOURS SCHEDULED
Status: DISCONTINUED | OUTPATIENT
Start: 2023-09-20 | End: 2023-09-25 | Stop reason: HOSPADM

## 2023-09-25 ENCOUNTER — ANESTHESIA EVENT (OUTPATIENT)
Dept: PERIOP | Facility: HOSPITAL | Age: 48
End: 2023-09-25
Payer: COMMERCIAL

## 2023-09-25 RX ORDER — IBUPROFEN 200 MG
TABLET ORAL EVERY 6 HOURS PRN
COMMUNITY

## 2023-09-25 RX ORDER — DULOXETIN HYDROCHLORIDE 60 MG/1
60 CAPSULE, DELAYED RELEASE ORAL DAILY
COMMUNITY

## 2023-09-25 RX ORDER — ATORVASTATIN CALCIUM 40 MG/1
40 TABLET, FILM COATED ORAL DAILY
COMMUNITY
Start: 2023-08-04

## 2023-09-25 NOTE — PRE-PROCEDURE INSTRUCTIONS
Pre-Surgery Instructions:   Medication Instructions   • albuterol (PROVENTIL HFA,VENTOLIN HFA) 90 mcg/act inhaler Uses PRN- OK to take day of surgery   • ALPRAZolam (XANAX) 1 mg tablet Uses PRN- OK to take day of surgery   • fluticasone-umeclidinium-vilanterol 100-62.5-25 mcg/actuation inhaler Take day of surgery. Medication instructions for day surgery reviewed. Please use only a sip of water to take your instructed medications. Avoid all over the counter vitamins, supplements and NSAIDS for one week prior to surgery per anesthesia guidelines. Tylenol is ok to take as needed. You will receive a call one business day prior to surgery with an arrival time and hospital directions. If your surgery is scheduled on a Monday, the hospital will be calling you on the Friday prior to your surgery. If you have not heard from anyone by 8pm, please call the hospital supervisor through the hospital  at [phone]. Usha Jennings [phone]). Do not eat or drink anything after midnight the night before your surgery, including candy, mints, lifesavers, or chewing gum. Do not drink alcohol 24hrs before your surgery. Try not to smoke at least 24hrs before your surgery. Follow the pre surgery showering instructions as listed in the [facility] Surgical Experience Booklet” or otherwise provided by your surgeon's office. Do not shave the surgical area 24 hours before surgery. Do not apply any lotions, creams, including makeup, cologne, deodorant, or perfumes after showering on the day of your surgery. No contact lenses, eye make-up, or artificial eyelashes. Remove nail polish, including gel polish, and any artificial, gel, or acrylic nails if possible. Remove all jewelry including rings and body piercing jewelry. Wear causal clothing that is easy to take on and off. Consider your type of surgery. Keep any valuables, jewelry, piercings at home.  Please bring any specially ordered equipment (sling, braces) if indicated. Arrange for a responsible person to drive you to and from the hospital on the day of your surgery. Visitor Guidelines discussed. Call the surgeon's office with any new illnesses, exposures, or additional questions prior to surgery. Please reference your [facility] Surgical Experience Booklet” for additional information to prepare for your upcoming surgery.

## 2023-09-26 ENCOUNTER — HOSPITAL ENCOUNTER (OUTPATIENT)
Facility: HOSPITAL | Age: 48
Setting detail: OUTPATIENT SURGERY
Discharge: HOME | End: 2023-09-26
Attending: SPECIALIST | Admitting: SPECIALIST
Payer: COMMERCIAL

## 2023-09-26 ENCOUNTER — ANESTHESIA (OUTPATIENT)
Dept: PERIOP | Facility: HOSPITAL | Age: 48
End: 2023-09-26
Payer: COMMERCIAL

## 2023-09-26 VITALS
SYSTOLIC BLOOD PRESSURE: 140 MMHG | DIASTOLIC BLOOD PRESSURE: 88 MMHG | HEART RATE: 89 BPM | HEIGHT: 73 IN | BODY MASS INDEX: 24.13 KG/M2 | RESPIRATION RATE: 20 BRPM | OXYGEN SATURATION: 96 % | WEIGHT: 182.1 LBS | TEMPERATURE: 97.3 F

## 2023-09-26 DIAGNOSIS — Z90.49 S/P LAPAROSCOPIC CHOLECYSTECTOMY: ICD-10-CM

## 2023-09-26 DIAGNOSIS — T85.518A CHOLECYSTOSTOMY TUBE DYSFUNCTION: ICD-10-CM

## 2023-09-26 DIAGNOSIS — K81.0 ACUTE CHOLECYSTITIS: Primary | ICD-10-CM

## 2023-09-26 LAB
ALBUMIN SERPL BCP-MCNC: 4.4 G/DL (ref 3.5–5)
ALP SERPL-CCNC: 92 U/L (ref 34–104)
ALT SERPL W P-5'-P-CCNC: 21 U/L (ref 7–52)
ANION GAP SERPL CALCULATED.3IONS-SCNC: 9 MMOL/L
AST SERPL W P-5'-P-CCNC: 17 U/L (ref 13–39)
BILIRUB SERPL-MCNC: 0.34 MG/DL (ref 0.2–1)
BUN SERPL-MCNC: 9 MG/DL (ref 5–25)
CALCIUM SERPL-MCNC: 9.5 MG/DL (ref 8.4–10.2)
CHLORIDE SERPL-SCNC: 102 MMOL/L (ref 96–108)
CO2 SERPL-SCNC: 29 MMOL/L (ref 21–32)
CREAT SERPL-MCNC: 0.79 MG/DL (ref 0.6–1.3)
ERYTHROCYTE [DISTWIDTH] IN BLOOD BY AUTOMATED COUNT: 13.7 % (ref 11.6–15.1)
GFR SERPL CREATININE-BSD FRML MDRD: 107 ML/MIN/1.73SQ M
GLUCOSE P FAST SERPL-MCNC: 95 MG/DL (ref 65–99)
GLUCOSE SERPL-MCNC: 95 MG/DL (ref 65–140)
HCT VFR BLD AUTO: 47 % (ref 36.5–49.3)
HGB BLD-MCNC: 15.2 G/DL (ref 12–17)
MCH RBC QN AUTO: 29 PG (ref 26.8–34.3)
MCHC RBC AUTO-ENTMCNC: 32.3 G/DL (ref 31.4–37.4)
MCV RBC AUTO: 90 FL (ref 82–98)
PLATELET # BLD AUTO: 281 THOUSANDS/UL (ref 149–390)
PMV BLD AUTO: 10.2 FL (ref 8.9–12.7)
POTASSIUM SERPL-SCNC: 3.4 MMOL/L (ref 3.5–5.3)
PROT SERPL-MCNC: 7.5 G/DL (ref 6.4–8.4)
RBC # BLD AUTO: 5.25 MILLION/UL (ref 3.88–5.62)
SODIUM SERPL-SCNC: 140 MMOL/L (ref 135–147)
WBC # BLD AUTO: 8.65 THOUSAND/UL (ref 4.31–10.16)

## 2023-09-26 PROCEDURE — 80053 COMPREHEN METABOLIC PANEL: CPT | Performed by: SPECIALIST

## 2023-09-26 PROCEDURE — 88304 TISSUE EXAM BY PATHOLOGIST: CPT | Performed by: PATHOLOGY

## 2023-09-26 PROCEDURE — 85027 COMPLETE CBC AUTOMATED: CPT | Performed by: SPECIALIST

## 2023-09-26 PROCEDURE — 47562 LAPAROSCOPIC CHOLECYSTECTOMY: CPT | Performed by: SPECIALIST

## 2023-09-26 RX ORDER — METRONIDAZOLE 500 MG/100ML
500 INJECTION, SOLUTION INTRAVENOUS ONCE
Status: COMPLETED | OUTPATIENT
Start: 2023-09-26 | End: 2023-09-26

## 2023-09-26 RX ORDER — HYDROMORPHONE HCL/PF 1 MG/ML
SYRINGE (ML) INJECTION AS NEEDED
Status: DISCONTINUED | OUTPATIENT
Start: 2023-09-26 | End: 2023-09-26

## 2023-09-26 RX ORDER — CHLORHEXIDINE GLUCONATE 4 G/100ML
SOLUTION TOPICAL DAILY PRN
Status: DISCONTINUED | OUTPATIENT
Start: 2023-09-26 | End: 2023-09-26 | Stop reason: SDUPTHER

## 2023-09-26 RX ORDER — MAGNESIUM HYDROXIDE 1200 MG/15ML
LIQUID ORAL AS NEEDED
Status: DISCONTINUED | OUTPATIENT
Start: 2023-09-26 | End: 2023-09-26 | Stop reason: HOSPADM

## 2023-09-26 RX ORDER — ROCURONIUM BROMIDE 10 MG/ML
INJECTION, SOLUTION INTRAVENOUS AS NEEDED
Status: DISCONTINUED | OUTPATIENT
Start: 2023-09-26 | End: 2023-09-26

## 2023-09-26 RX ORDER — PROPOFOL 10 MG/ML
INJECTION, EMULSION INTRAVENOUS AS NEEDED
Status: DISCONTINUED | OUTPATIENT
Start: 2023-09-26 | End: 2023-09-26

## 2023-09-26 RX ORDER — SODIUM CHLORIDE 9 MG/ML
INJECTION, SOLUTION INTRAVENOUS CONTINUOUS PRN
Status: DISCONTINUED | OUTPATIENT
Start: 2023-09-26 | End: 2023-09-26

## 2023-09-26 RX ORDER — SODIUM CHLORIDE 9 MG/ML
INJECTION, SOLUTION INTRAVENOUS AS NEEDED
Status: DISCONTINUED | OUTPATIENT
Start: 2023-09-26 | End: 2023-09-26 | Stop reason: HOSPADM

## 2023-09-26 RX ORDER — BUPIVACAINE HYDROCHLORIDE AND EPINEPHRINE 5; 5 MG/ML; UG/ML
INJECTION, SOLUTION EPIDURAL; INTRACAUDAL; PERINEURAL AS NEEDED
Status: DISCONTINUED | OUTPATIENT
Start: 2023-09-26 | End: 2023-09-26 | Stop reason: HOSPADM

## 2023-09-26 RX ORDER — OXYCODONE HYDROCHLORIDE AND ACETAMINOPHEN 5; 325 MG/1; MG/1
1 TABLET ORAL ONCE AS NEEDED
Status: DISCONTINUED | OUTPATIENT
Start: 2023-09-26 | End: 2023-09-26 | Stop reason: HOSPADM

## 2023-09-26 RX ORDER — LIDOCAINE HYDROCHLORIDE 10 MG/ML
INJECTION, SOLUTION EPIDURAL; INFILTRATION; INTRACAUDAL; PERINEURAL AS NEEDED
Status: DISCONTINUED | OUTPATIENT
Start: 2023-09-26 | End: 2023-09-26

## 2023-09-26 RX ORDER — LEVOFLOXACIN 5 MG/ML
500 INJECTION, SOLUTION INTRAVENOUS ONCE
Status: DISCONTINUED | OUTPATIENT
Start: 2023-09-26 | End: 2023-09-26 | Stop reason: SDUPTHER

## 2023-09-26 RX ORDER — FENTANYL CITRATE 50 UG/ML
INJECTION, SOLUTION INTRAMUSCULAR; INTRAVENOUS AS NEEDED
Status: DISCONTINUED | OUTPATIENT
Start: 2023-09-26 | End: 2023-09-26

## 2023-09-26 RX ORDER — MIDAZOLAM HYDROCHLORIDE 2 MG/2ML
INJECTION, SOLUTION INTRAMUSCULAR; INTRAVENOUS AS NEEDED
Status: DISCONTINUED | OUTPATIENT
Start: 2023-09-26 | End: 2023-09-26

## 2023-09-26 RX ORDER — OXYCODONE HYDROCHLORIDE 5 MG/1
5 TABLET ORAL EVERY 6 HOURS PRN
Qty: 8 TABLET | Refills: 0 | Status: SHIPPED | OUTPATIENT
Start: 2023-09-26 | End: 2023-10-04

## 2023-09-26 RX ORDER — FENTANYL CITRATE/PF 50 MCG/ML
25 SYRINGE (ML) INJECTION
Status: DISCONTINUED | OUTPATIENT
Start: 2023-09-26 | End: 2023-09-26 | Stop reason: HOSPADM

## 2023-09-26 RX ORDER — CHLORHEXIDINE GLUCONATE 4 G/100ML
SOLUTION TOPICAL DAILY PRN
Status: DISCONTINUED | OUTPATIENT
Start: 2023-09-26 | End: 2023-09-26 | Stop reason: HOSPADM

## 2023-09-26 RX ORDER — ONDANSETRON 2 MG/ML
4 INJECTION INTRAMUSCULAR; INTRAVENOUS ONCE AS NEEDED
Status: DISCONTINUED | OUTPATIENT
Start: 2023-09-26 | End: 2023-09-26 | Stop reason: HOSPADM

## 2023-09-26 RX ORDER — ONDANSETRON 2 MG/ML
INJECTION INTRAMUSCULAR; INTRAVENOUS AS NEEDED
Status: DISCONTINUED | OUTPATIENT
Start: 2023-09-26 | End: 2023-09-26

## 2023-09-26 RX ORDER — SODIUM CHLORIDE, SODIUM LACTATE, POTASSIUM CHLORIDE, CALCIUM CHLORIDE 600; 310; 30; 20 MG/100ML; MG/100ML; MG/100ML; MG/100ML
125 INJECTION, SOLUTION INTRAVENOUS CONTINUOUS
Status: DISCONTINUED | OUTPATIENT
Start: 2023-09-26 | End: 2023-09-26 | Stop reason: HOSPADM

## 2023-09-26 RX ORDER — DEXAMETHASONE SODIUM PHOSPHATE 10 MG/ML
INJECTION, SOLUTION INTRAMUSCULAR; INTRAVENOUS AS NEEDED
Status: DISCONTINUED | OUTPATIENT
Start: 2023-09-26 | End: 2023-09-26

## 2023-09-26 RX ORDER — LEVOFLOXACIN 5 MG/ML
500 INJECTION, SOLUTION INTRAVENOUS ONCE
Status: COMPLETED | OUTPATIENT
Start: 2023-09-26 | End: 2023-09-26

## 2023-09-26 RX ADMIN — FENTANYL CITRATE 100 MCG: 50 INJECTION, SOLUTION INTRAMUSCULAR; INTRAVENOUS at 10:33

## 2023-09-26 RX ADMIN — DEXAMETHASONE SODIUM PHOSPHATE 10 MG: 10 INJECTION, SOLUTION INTRAMUSCULAR; INTRAVENOUS at 09:56

## 2023-09-26 RX ADMIN — SODIUM CHLORIDE: 0.9 INJECTION, SOLUTION INTRAVENOUS at 10:45

## 2023-09-26 RX ADMIN — HYDROMORPHONE HYDROCHLORIDE 1 MG: 1 INJECTION, SOLUTION INTRAMUSCULAR; INTRAVENOUS; SUBCUTANEOUS at 09:53

## 2023-09-26 RX ADMIN — FENTANYL CITRATE 25 MCG: 50 INJECTION INTRAMUSCULAR; INTRAVENOUS at 12:10

## 2023-09-26 RX ADMIN — ROCURONIUM BROMIDE 20 MG: 10 INJECTION, SOLUTION INTRAVENOUS at 10:29

## 2023-09-26 RX ADMIN — FENTANYL CITRATE 50 MCG: 50 INJECTION, SOLUTION INTRAMUSCULAR; INTRAVENOUS at 09:50

## 2023-09-26 RX ADMIN — SUGAMMADEX 100 MG: 100 INJECTION, SOLUTION INTRAVENOUS at 11:11

## 2023-09-26 RX ADMIN — FENTANYL CITRATE 50 MCG: 50 INJECTION, SOLUTION INTRAMUSCULAR; INTRAVENOUS at 09:45

## 2023-09-26 RX ADMIN — LIDOCAINE HYDROCHLORIDE 50 MG: 10 INJECTION, SOLUTION EPIDURAL; INFILTRATION; INTRACAUDAL; PERINEURAL at 09:45

## 2023-09-26 RX ADMIN — METRONIDAZOLE: 500 INJECTION, SOLUTION INTRAVENOUS at 09:40

## 2023-09-26 RX ADMIN — ONDANSETRON 4 MG: 2 INJECTION INTRAMUSCULAR; INTRAVENOUS at 09:56

## 2023-09-26 RX ADMIN — PROPOFOL 200 MG: 10 INJECTION, EMULSION INTRAVENOUS at 09:45

## 2023-09-26 RX ADMIN — MIDAZOLAM 2 MG: 1 INJECTION INTRAMUSCULAR; INTRAVENOUS at 09:36

## 2023-09-26 RX ADMIN — FENTANYL CITRATE 25 MCG: 50 INJECTION INTRAMUSCULAR; INTRAVENOUS at 12:18

## 2023-09-26 RX ADMIN — LEVOFLOXACIN: 5 INJECTION, SOLUTION INTRAVENOUS at 09:48

## 2023-09-26 RX ADMIN — SODIUM CHLORIDE, SODIUM LACTATE, POTASSIUM CHLORIDE, AND CALCIUM CHLORIDE 125 ML/HR: .6; .31; .03; .02 INJECTION, SOLUTION INTRAVENOUS at 08:42

## 2023-09-26 RX ADMIN — FENTANYL CITRATE 50 MCG: 50 INJECTION, SOLUTION INTRAMUSCULAR; INTRAVENOUS at 10:10

## 2023-09-26 RX ADMIN — ROCURONIUM BROMIDE 50 MG: 10 INJECTION, SOLUTION INTRAVENOUS at 09:46

## 2023-09-26 RX ADMIN — FENTANYL CITRATE 50 MCG: 50 INJECTION, SOLUTION INTRAMUSCULAR; INTRAVENOUS at 10:04

## 2023-09-26 RX ADMIN — SUGAMMADEX 100 MG: 100 INJECTION, SOLUTION INTRAVENOUS at 11:09

## 2023-09-26 NOTE — PERIOPERATIVE NURSING NOTE
AVS reviewed with patient. Pain medication will be sent via e-scription to his pharmacy. All questions answered and concerns addressed. IV access removed. Patient surgical dressing are clean, dry and intact. Patient discharged via wheelchair with all belongings.

## 2023-09-26 NOTE — PERIOPERATIVE NURSING NOTE
Received from PACU. Abdominal dressings x 4 , all dry, intact. Ice given to sites. PO fluids given, pain level at #4/10. Resting comfortable.

## 2023-09-26 NOTE — INTERVAL H&P NOTE
H&P reviewed. After examining the patient I find no changes in the patients condition since the H&P had been written.     Vitals:    09/26/23 0829   BP: 153/74   Pulse: 66   Resp: 18   Temp: (!) 97.3 °F (36.3 °C)   SpO2: 98%     Patient examined and seen by Dr. Omayra Escalante MD along with surgical team  No new changes noted cholecystostomy tube is draining minimally    Denies recent IR tube check which shows obstructed cystic duct    Patient is here for laparoscopic cholecystectomy for possible gallstones possible acute a calculus cholecystitis that is post cholecystostomy tube for 6 weeks  Persistent right upper quadrant    Site was marked  Instruction were discussed patient detail all question answered to his satisfaction  Labs reviewed  Patient is allergic to penicillin will give Levaquin Flagyl

## 2023-09-26 NOTE — OP NOTE
OPERATIVE REPORT  PATIENT NAME: Isidoro Christie    :  1975  MRN: [id number]  Pt Location: [facility] OR ROOM 02    SURGERY DATE: 2023    Surgeon(s) and Role:     * Harvinder Foley MD - Primary     * Gage Sinks, DO - Assisting    Preop Diagnosis:  Acute cholecystitis [K81.0]  Cholecystostomy tube dysfunction [T85.518A]    Post-Op Diagnosis Codes:     * Acute cholecystitis [K81.0]     * Cholecystostomy tube dysfunction [T85.518A]    Procedure(s):  CHOLECYSTECTOMY LAPAROSCOPIC, lysis of adhesions, removal of cholecystostomy tube     Specimen(s):  ID Type Source Tests Collected by Time Destination   1 :  Tissue Gallbladder TISSUE EXAM Harvinder Foley MD 2023 1029        Estimated Blood Loss:   Minimal    Drains:  * No LDAs found *    Anesthesia Type:   General    Operative Indications:  Acute cholecystitis [K81.0]  Cholecystostomy tube dysfunction [T85.518A]    Operative Findings:  Adhesions present between falciform and stomach -> incised sharply  Dense omental adhesions to gallbladder -> taken down with hook electrocautery and blunt dissection  Thickened gallbladder. Normal biliary anatomy  Removal of percutaneous cholecystostomy tube intact     Complications:   None    Procedure and Technique:  The patient was brought to the operating room and identified verbally and via wristband. He was transferred to the operating room table and positioned supine. General endotracheal anesthesia was induced successfully. The patient's abdomen was prepped and draped in the usual sterile fashion. Pre-operative antibiotics were administered. A time out was performed confirming the patient, procedure, and site. All parties were in agreement. Attention was turned to the abdomen where a curvilinear infraumbilical 10 mm incision was made with a 15 blade scalpel. Dissection was deepened through the subcutaneous tissue with the Hemostat. An umbilical hernia was present, which was widened with Bovie electrocautery. The 11 mm Penn trocar was placed through this defect. CO2 insufflation was initiated which patient tolerated well. The laparoscope was inserted and the surrounding intra-abdominal contents were inspected for injury upon entry, none were appreciated. Attention was turned to the right upper quadrant where the dense adhesions were noted between the falciform ligament and the stomach, obstructing the view of the gallbladder. Two 5 mm trocars were placed in the right upper quadrant under laparoscopic visualization. These adhesions were taken down sharply with laparoscopic scissors. A 10 mm epigastric trocar was placed under laparoscopic guidance. Dense omental adhesions were present to the gallbladder. These were taken down with a combination of both blunt dissection with a Maryland grasper and hook electrocautery. The gallblader was then grasped with a locking grasper and retracted cephalad above the liver. The infundibulum of the gallbladder was grasped with an additional grasper and retraced laterally. The peritoneum overlying the cystic duct and cystic artery was dissected bluntly with a Maryland grasper. The cystic duct and cystic artery were skelentonized with a Maryland grasper until the critical view of safety was obtained with two tubular structures entering the gallbladder and the cystic plate in the background. The cystic duct and cystic artery were clipped x 3 and incised with laparoscopic scissors so that two clips remained in the abdomen. The gallbladder was then dissected free from the liver edge using hook electrocautery. The intact cholecystostomy tube was removed from the abdomen throughout this process. The gallbladder was then placed in an endocatch bag. The gallbladder fossa was irrigated and suctioned. Hemostasis within the gallbladder fossa was achieved with a combination of hook electrocautery and Surgiflo foam. Hemostasis was adequate. The gallbladder was then removed from the abdomen. The trocars were then removed. The fascia of the 11 mm trocar site and 10 mm trocar site was closed with 0 vicryl suture on a UR 6. The skin of each incision was closed with 4-0 monocryl suture and covered with steri strips and gauze/tegaderm. The patient was then allowed to awaken, extubated, and transferred to the PACU having tolerated the procedure well. All instrument, needle, and sponge counts were correct at the end of the case. Radiofrequency detection was negative.     Dr. Vaishali Lange was present for the entire procedure      Patient Disposition:  PACU         SIGNATURE: Emilia Connolly DO  DATE: September 26, 2023  TIME: 11:30 AM

## 2023-09-26 NOTE — ANESTHESIA POSTPROCEDURE EVALUATION
Post-Op Assessment Note    CV Status:  Stable  Pain Score: 0    Pain management: adequate     Mental Status:  Awake   Hydration Status:  Euvolemic and stable   PONV Controlled:  Controlled   Airway Patency:  Patent      Post Op Vitals Reviewed: Yes      Staff: CRNA         No notable events documented.     BP  163/92    Temp     Pulse  97   Resp   15   SpO2   95%   /

## 2023-09-26 NOTE — DISCHARGE INSTR - AVS FIRST PAGE
Procedure(s): Laparoscopic cholecystectomy       Surgeon(s):  MD Luiz Santos,     1. Diet:  Resume your normal diet. Avoid red meat and eat lots of yogurt. 2. Medications:  Home medications reviewed. Resume pre-operative medications unless noted below. Prescription sent home with patient and Prescription sent over to pharmacy  See After Visit Summary paperwork for reconciled discharge medications provided to patient and family. DO NOT take xanax and oxycodone at the same time. These drugs can cause fatal respiratory depression  3. Activities:  Do NOT make important personal or business decisions for 24 hours. Do NOT drive or operate machinery while on narcotic pain medication. Limit your activities for 3 weeks. Do not engage in sports, heavy work or heavy lifting, until your physician gives you permission. 4. Wound Care:  Change dressings as necessary after 2 days. Keep dressings dry. 5. Special Instructions:  Full weight bearing as appropriate. 6. Bathing:  May shower the day after surgery. Do not soak in bath, pool, hot tub, etc.   7. When to call:       Call if fever, Nausea, vomiting, Constipation not feeling well, or wound infection, bleeding,reddness and excessive pain,  8. Follow-up Care: Make an appointment to see Rashida Mcmillan MD. [address] [place] [address]. FACS. in 10-14 days for follow up. Please Call Office [phone] for appointment,   Call office if experiencing fever, nausea, vomiting, constipation, or wound infection, bleeding, redness or excessive pain. 9. Pain: A prescription for narcotic pain medication will be sent to your pharmacy upon discharge from the hospital. Please only take this medication if absolutely necessary. A good option for pain control is to start with acetaminophen(Tylenol) 650mg and ibuprofen(Advil) 400-600mg and alternate taking them every 3 hours. If this is not sufficient then you make take the narcotic pain medicine as prescribed.  You do not need to take the narcotic pain medication unless you are having significant pain and discomfort. Please take the narcotic medication with food. Insure that you do not take more than 4000 mg of Tylenol per day. Please return extra narcotics to your local pharmacy.

## 2023-09-28 DIAGNOSIS — K81.0 ACUTE CHOLECYSTITIS: Primary | ICD-10-CM

## 2023-09-28 PROCEDURE — 88304 TISSUE EXAM BY PATHOLOGIST: CPT | Performed by: PATHOLOGY

## 2023-09-29 ENCOUNTER — APPOINTMENT (EMERGENCY)
Dept: RADIOLOGY | Facility: HOSPITAL | Age: 48
End: 2023-09-29
Payer: COMMERCIAL

## 2023-09-29 ENCOUNTER — HOSPITAL ENCOUNTER (EMERGENCY)
Facility: HOSPITAL | Age: 48
Discharge: HOME | End: 2023-09-29
Attending: EMERGENCY MEDICINE
Payer: COMMERCIAL

## 2023-09-29 VITALS
WEIGHT: 182.6 LBS | RESPIRATION RATE: 18 BRPM | DIASTOLIC BLOOD PRESSURE: 78 MMHG | TEMPERATURE: 98 F | BODY MASS INDEX: 24.09 KG/M2 | HEART RATE: 67 BPM | OXYGEN SATURATION: 98 % | SYSTOLIC BLOOD PRESSURE: 129 MMHG

## 2023-09-29 DIAGNOSIS — G89.18 POST-OPERATIVE PAIN: Primary | ICD-10-CM

## 2023-09-29 DIAGNOSIS — K59.00 CONSTIPATION, UNSPECIFIED CONSTIPATION TYPE: ICD-10-CM

## 2023-09-29 LAB
ALBUMIN SERPL BCP-MCNC: 4.2 G/DL (ref 3.5–5)
ALP SERPL-CCNC: 84 U/L (ref 34–104)
ALT SERPL W P-5'-P-CCNC: 20 U/L (ref 7–52)
ANION GAP SERPL CALCULATED.3IONS-SCNC: 6 MMOL/L
AST SERPL W P-5'-P-CCNC: 13 U/L (ref 13–39)
BASOPHILS # BLD AUTO: 0.12 THOUSANDS/ÂΜL (ref 0–0.1)
BASOPHILS NFR BLD AUTO: 1 % (ref 0–1)
BILIRUB DIRECT SERPL-MCNC: 0.07 MG/DL (ref 0–0.2)
BILIRUB SERPL-MCNC: 0.4 MG/DL (ref 0.2–1)
BILIRUB UR QL STRIP: NEGATIVE
BUN SERPL-MCNC: 13 MG/DL (ref 5–25)
CALCIUM SERPL-MCNC: 9.5 MG/DL (ref 8.4–10.2)
CHLORIDE SERPL-SCNC: 101 MMOL/L (ref 96–108)
CLARITY UR: CLEAR
CO2 SERPL-SCNC: 32 MMOL/L (ref 21–32)
COLOR UR: YELLOW
CREAT SERPL-MCNC: 0.85 MG/DL (ref 0.6–1.3)
EOSINOPHIL # BLD AUTO: 0.26 THOUSAND/ÂΜL (ref 0–0.61)
EOSINOPHIL NFR BLD AUTO: 2 % (ref 0–6)
ERYTHROCYTE [DISTWIDTH] IN BLOOD BY AUTOMATED COUNT: 13.6 % (ref 11.6–15.1)
GFR SERPL CREATININE-BSD FRML MDRD: 104 ML/MIN/1.73SQ M
GLUCOSE SERPL-MCNC: 90 MG/DL (ref 65–140)
GLUCOSE UR STRIP-MCNC: NEGATIVE MG/DL
HCT VFR BLD AUTO: 44.8 % (ref 36.5–49.3)
HGB BLD-MCNC: 14.6 G/DL (ref 12–17)
HGB UR QL STRIP.AUTO: NEGATIVE
IMM GRANULOCYTES # BLD AUTO: 0.07 THOUSAND/UL (ref 0–0.2)
IMM GRANULOCYTES NFR BLD AUTO: 1 % (ref 0–2)
KETONES UR STRIP-MCNC: NEGATIVE MG/DL
LEUKOCYTE ESTERASE UR QL STRIP: NEGATIVE
LIPASE SERPL-CCNC: 18 U/L (ref 11–82)
LYMPHOCYTES # BLD AUTO: 2.82 THOUSANDS/ÂΜL (ref 0.6–4.47)
LYMPHOCYTES NFR BLD AUTO: 26 % (ref 14–44)
MCH RBC QN AUTO: 29 PG (ref 26.8–34.3)
MCHC RBC AUTO-ENTMCNC: 32.6 G/DL (ref 31.4–37.4)
MCV RBC AUTO: 89 FL (ref 82–98)
MONOCYTES # BLD AUTO: 0.84 THOUSAND/ÂΜL (ref 0.17–1.22)
MONOCYTES NFR BLD AUTO: 8 % (ref 4–12)
NEUTROPHILS # BLD AUTO: 6.76 THOUSANDS/ÂΜL (ref 1.85–7.62)
NEUTS SEG NFR BLD AUTO: 62 % (ref 43–75)
NITRITE UR QL STRIP: NEGATIVE
NRBC BLD AUTO-RTO: 0 /100 WBCS
PH UR STRIP.AUTO: 6 [PH]
PLATELET # BLD AUTO: 266 THOUSANDS/UL (ref 149–390)
PMV BLD AUTO: 10.3 FL (ref 8.9–12.7)
POTASSIUM SERPL-SCNC: 3.9 MMOL/L (ref 3.5–5.3)
PROT SERPL-MCNC: 7.1 G/DL (ref 6.4–8.4)
PROT UR STRIP-MCNC: NEGATIVE MG/DL
RBC # BLD AUTO: 5.03 MILLION/UL (ref 3.88–5.62)
SODIUM SERPL-SCNC: 139 MMOL/L (ref 135–147)
SP GR UR STRIP.AUTO: 1.01 (ref 1–1.03)
UROBILINOGEN UR QL STRIP.AUTO: 0.2 E.U./DL
WBC # BLD AUTO: 10.87 THOUSAND/UL (ref 4.31–10.16)

## 2023-09-29 PROCEDURE — 83690 ASSAY OF LIPASE: CPT | Performed by: EMERGENCY MEDICINE

## 2023-09-29 PROCEDURE — 74177 CT ABD & PELVIS W/CONTRAST: CPT

## 2023-09-29 PROCEDURE — 99285 EMERGENCY DEPT VISIT HI MDM: CPT | Performed by: EMERGENCY MEDICINE

## 2023-09-29 PROCEDURE — 99024 POSTOP FOLLOW-UP VISIT: CPT | Performed by: PHYSICIAN ASSISTANT

## 2023-09-29 PROCEDURE — 85025 COMPLETE CBC W/AUTO DIFF WBC: CPT | Performed by: EMERGENCY MEDICINE

## 2023-09-29 PROCEDURE — 80076 HEPATIC FUNCTION PANEL: CPT | Performed by: EMERGENCY MEDICINE

## 2023-09-29 PROCEDURE — 96374 THER/PROPH/DIAG INJ IV PUSH: CPT

## 2023-09-29 PROCEDURE — 99284 EMERGENCY DEPT VISIT MOD MDM: CPT

## 2023-09-29 PROCEDURE — 36415 COLL VENOUS BLD VENIPUNCTURE: CPT | Performed by: EMERGENCY MEDICINE

## 2023-09-29 PROCEDURE — 81003 URINALYSIS AUTO W/O SCOPE: CPT | Performed by: EMERGENCY MEDICINE

## 2023-09-29 PROCEDURE — 80048 BASIC METABOLIC PNL TOTAL CA: CPT | Performed by: EMERGENCY MEDICINE

## 2023-09-29 PROCEDURE — 96361 HYDRATE IV INFUSION ADD-ON: CPT

## 2023-09-29 RX ORDER — BISACODYL 10 MG
10 SUPPOSITORY, RECTAL RECTAL DAILY
Qty: 12 SUPPOSITORY | Refills: 0 | Status: SHIPPED | OUTPATIENT
Start: 2023-09-29

## 2023-09-29 RX ORDER — MAGNESIUM CARB/ALUMINUM HYDROX 105-160MG
296 TABLET,CHEWABLE ORAL ONCE
Qty: 296 ML | Refills: 0 | Status: SHIPPED | OUTPATIENT
Start: 2023-09-29 | End: 2023-09-29

## 2023-09-29 RX ORDER — OXYCODONE HYDROCHLORIDE 5 MG/1
5 TABLET ORAL EVERY 6 HOURS PRN
Qty: 4 TABLET | Refills: 0 | Status: SHIPPED | OUTPATIENT
Start: 2023-09-29 | End: 2023-10-03

## 2023-09-29 RX ORDER — HYDROMORPHONE HCL/PF 1 MG/ML
0.5 SYRINGE (ML) INJECTION ONCE
Status: COMPLETED | OUTPATIENT
Start: 2023-09-29 | End: 2023-09-29

## 2023-09-29 RX ADMIN — HYDROMORPHONE HYDROCHLORIDE 0.5 MG: 1 INJECTION, SOLUTION INTRAMUSCULAR; INTRAVENOUS; SUBCUTANEOUS at 13:26

## 2023-09-29 RX ADMIN — SODIUM CHLORIDE 1000 ML: 0.9 INJECTION, SOLUTION INTRAVENOUS at 13:21

## 2023-09-29 RX ADMIN — IOHEXOL 100 ML: 350 INJECTION, SOLUTION INTRAVENOUS at 13:55

## 2023-09-29 NOTE — CONSULTS
Consultation - General Surgery   Sarah Joseph 57 y.o. male MRN: [id number]  Unit/Bed#: ED 03 Encounter: [id number]    Assessment/Plan     Assessment:  1) Abdominal Soreness -diffusely sore throughout the entirety of the abdominal cavity, routine/traditional postoperative tenderness secondary to the fact the patient is only postoperative day 3, no significant nausea or vomiting, no suprapubic tenderness, no dysuria, no hematuria, no diarrhea, no distention, no localized pain, no signs of peritonitis, no radiation, CT does not exhibit any major abnormalities other than what is noted below, no elevated LFTs  2) Constipation -patient slightly tympanic in the upper quadrants, patient has some degree of abdominal soreness, has been exposed to opioids over the course of the last 3 days significantly, stool throughout the entirety of the colon, has only been passing gas and the only time he had a bowel movement was passing rabbit pellet like stool  3) Abnormal CT scan finding -small fluid and air collection within the gallbladder fossa; however, this can be an expected finding completely typical in the postoperative window for 3 days from having laparoscopic cholecystectomy, in addition to this patient also had small amount of oozing/bleeding intraoperatively and as a result did have placement of Surgi-Vidal foam in the gallbladder fossa which can also add to this exam finding on CT exam, no localized pain in the upper right quadrant, no fevers or chills, no elevated LFTs    Plan:  1-3)   -Mag citrate liquid p.o. and Dulcolax suppository for home  -P.o.  Colace  -No need for any further opioid-based pain medication  -Follow-up in 2 weeks  -Return to the emergency department in the event that patient is having progressively worsening abdominal pain or pain localized to the upper right quadrant specifically if it is correlating with fevers and chills  -Continue home medications  -Reviewed labs  -Reviewed imaging  -Relayed information/plan to chief resident  -Patient education  -Discharge from ED    History of Present Illness   HPI:  Warden Foster is a 57 y.o. male with a past medical history pertinent for hyperlipidemia, COPD, recent laparoscopic cholecystectomy 3 days ago presenting to the acute care surgery team secondary generalized abdominal soreness. Patient reports that over the last 3 days he has developed a persisting/nonstop abdominal soreness that is generalized in nature and radiates towards the back. Patient has not had a significant bowel movement in the last 3 days but has tried to have a bowel movement with passing of rabbit like pellet type stool. Patient has been passing gas. Patient has not had any sort of distention or bloating. Patient has not had any nausea or vomiting. Patient has not had any fevers or chills. Patient has no localized pain to the upper right quadrant. Patient denies any suprapubic tenderness. Patient denies any dysuria or hematuria. Patient denies any sort of progressively worsening pain in the upper right quadrant. Patient's pain is more achy and soreness and its persisting consistently even when he is not movin but is exacerbated with movement significantly more especially when he sits up. Patient denies any other symptoms, refer to review of systems. Patient is eager to go home and to try taking laxatives in order to poop. Consult to surgery general  Consult performed by: Meet Jimenez PA-C  Consult ordered by: Sindi Redding DO          Review of Systems   Constitutional: Negative for appetite change, chills and fever. HENT: Negative for congestion and rhinorrhea. Respiratory: Negative for cough, chest tightness, shortness of breath and wheezing. Cardiovascular: Negative for chest pain and palpitations. Gastrointestinal: Positive for abdominal pain and constipation. Negative for abdominal distention, blood in stool, diarrhea and nausea.    Genitourinary: Negative for difficulty urinating, dysuria and hematuria. Musculoskeletal: Negative for arthralgias and gait problem. Skin: Negative for color change and wound. Neurological: Negative for dizziness, seizures, weakness and numbness. Psychiatric/Behavioral: Negative for agitation and confusion. Historical Information   Past Medical History:   Diagnosis Date   • Allergic rhinitis due to allergen 10/01/2018   • Anxiety    • Chronic pain    • COPD (chronic obstructive pulmonary disease) ([address])    • Depression 10/17/2016   • Diarrhea in adult patient    • Hyperlipidemia      Past Surgical History:   Procedure Laterality Date   • EGD AND COLONOSCOPY     • ELBOW ARTHROSCOPY     • ELBOW SURGERY      x2   • IR CHOLECYSTOSTOMY TUBE CHECK/CHANGE/REPOSITION/REINSERTION/UPSIZE  09/11/2023   • IR CHOLECYSTOSTOMY TUBE PLACEMENT  07/28/2023   • TN LAPAROSCOPY SURG CHOLECYSTECTOMY N/A 9/26/2023    Procedure: CHOLECYSTECTOMY LAPAROSCOPIC;  Surgeon: Zac Soto MD;  Location: [facility];  Service: General   • WRIST SURGERY       Social History   Social History     Substance and Sexual Activity   Alcohol Use Not Currently     Social History     Substance and Sexual Activity   Drug Use No    Comment: Is in pain mangement,  to get off pain meds. E-Cigarette/Vaping   • E-Cigarette Use Never User      E-Cigarette/Vaping Substances     Social History     Tobacco Use   Smoking Status Every Day   • Packs/day: 0.50   • Years: 30.00   • Total pack years: 15.00   • Types: Cigarettes   • Passive exposure: Never   Smokeless Tobacco Never     Family History: non-contributory    Meds/Allergies   all current active meds have been reviewed and current meds:   No current facility-administered medications for this encounter.      Allergies   Allergen Reactions   • Zosyn [Piperacillin Sod-Tazobactam So] Hives       Objective   First Vitals:   Blood Pressure: 129/78 (09/29/23 1247)  Pulse: 92 (standing) (09/29/23 1247)  Temperature: 98 °F (36.7 °C) (09/29/23 1247)  Temp Source: Oral (09/29/23 1247)  Respirations: 18 (09/29/23 1247)  Weight - Scale: 82.8 kg (182 lb 9.6 oz) (09/29/23 1247)  SpO2: 98 % (09/29/23 1247)    Current Vitals:   Blood Pressure: 129/78 (09/29/23 1247)  Pulse: 67 (09/29/23 1645)  Temperature: 98 °F (36.7 °C) (09/29/23 1247)  Temp Source: Oral (09/29/23 1247)  Respirations: 18 (09/29/23 1645)  Weight - Scale: 82.8 kg (182 lb 9.6 oz) (09/29/23 1247)  SpO2: 98 % (09/29/23 1645)      Intake/Output Summary (Last 24 hours) at 9/29/2023 1646  Last data filed at 9/29/2023 1518  Gross per 24 hour   Intake 1000 ml   Output --   Net 1000 ml       Invasive Devices     Peripheral Intravenous Line  Duration           Peripheral IV 09/29/23 Right Antecubital <1 day                Physical Exam  Constitutional:       General: He is awake. He is not in acute distress. Appearance: Normal appearance. He is normal weight. He is not ill-appearing, toxic-appearing or diaphoretic. Interventions: He is not intubated. HENT:      Head: Normocephalic and atraumatic. Right Ear: Hearing and external ear normal.      Left Ear: Hearing and external ear normal.      Nose: Nose normal.   Cardiovascular:      Rate and Rhythm: Normal rate and regular rhythm. Heart sounds: Normal heart sounds, S1 normal and S2 normal. Heart sounds not distant. No murmur heard. Pulmonary:      Effort: Pulmonary effort is normal. No tachypnea, bradypnea, accessory muscle usage, prolonged expiration, respiratory distress or retractions. He is not intubated. Breath sounds: Normal breath sounds. No stridor, decreased air movement or transmitted upper airway sounds. No decreased breath sounds, wheezing, rhonchi or rales. Abdominal:      General: Abdomen is flat. Bowel sounds are increased. There is no distension (tympany to percussion). Palpations: Abdomen is soft. Tenderness: There is generalized abdominal tenderness.  There is right CVA tenderness and left CVA tenderness. There is no guarding or rebound. Hernia: No hernia is present. Skin:     General: Skin is warm and dry. Capillary Refill: Capillary refill takes less than 2 seconds. Neurological:      General: No focal deficit present. Mental Status: He is alert and oriented to person, place, and time. GCS: GCS eye subscore is 4. GCS verbal subscore is 5. GCS motor subscore is 6. Sensory: Sensation is intact. Motor: Motor function is intact. Psychiatric:         Behavior: Behavior is cooperative. Lab Results:   I have personally reviewed pertinent lab results. , CBC:   Lab Results   Component Value Date    WBC 10.87 (H) 09/29/2023    HGB 14.6 09/29/2023    HCT 44.8 09/29/2023    MCV 89 09/29/2023     09/29/2023    RBC 5.03 09/29/2023    MCH 29.0 09/29/2023    MCHC 32.6 09/29/2023    RDW 13.6 09/29/2023    MPV 10.3 09/29/2023    NRBC 0 09/29/2023   , CMP:   Lab Results   Component Value Date    SODIUM 139 09/29/2023    K 3.9 09/29/2023     09/29/2023    CO2 32 09/29/2023    BUN 13 09/29/2023    CREATININE 0.85 09/29/2023    CALCIUM 9.5 09/29/2023    AST 13 09/29/2023    ALT 20 09/29/2023    ALKPHOS 84 09/29/2023    EGFR 104 09/29/2023     Imaging: I have personally reviewed pertinent reports. EKG, Pathology, and Other Studies: I have personally reviewed pertinent reports. Counseling / Coordination of Care  Total floor / unit time spent today 40 minutes. Greater than 50% of total time was spent with the patient and / or family counseling and / or coordination of care. A description of the counseling / coordination of care: Developing plan, reviewing with attending, coordinating care, physical exam, history taking, reviewing labs, reviewing imaging, patient education, ordering laxatives.

## 2023-09-29 NOTE — ED PROVIDER NOTES
History  Chief Complaint   Patient presents with   • Post-op Problem     Patient had cholecystectomy on 9/26. States it hurts too much to sit so standing , states pain his entire abdomen. States no N/V and appetite good, eating small meals , eating fiber and taking Miralax. States last BM yesterday " was like a little rabbit poop " yesterday. Patient presents for postoperative abdominal pain. Patient had a cholecystectomy performed 3 days ago with Dr. Piotr Wylie. States the pain has been there since the surgery but he has been taking pain medication at home. However now that he is out of the Percocet he can no longer manage the pain at home. Pain is constant and no apparent modifying factors. He is able to eat and drink without any nausea and vomiting. He is having a hard time moving his bowels is taking MiraLAX and is having small bowel movement yesterday. Denies any fever or chills. Called the office and was referred to the ER for evaluation. History provided by:  Patient   used: No        Prior to Admission Medications   Prescriptions Last Dose Informant Patient Reported? Taking?    ALPRAZolam (XANAX) 1 mg tablet   Yes No   Sig: TAKE 1 TABLET BY MOUTH THREE TIMES A DAY AS NEEDED   DULoxetine (CYMBALTA) 60 mg delayed release capsule   Yes No   Sig: Take 60 mg by mouth daily   albuterol (PROVENTIL HFA,VENTOLIN HFA) 90 mcg/act inhaler   Yes No   Sig: INL 2 PFS PO Q 4 H PRF WHZ   atorvastatin (LIPITOR) 40 mg tablet   Yes No   Sig: Take 40 mg by mouth daily   fluticasone-umeclidinium-vilanterol 100-62.5-25 mcg/actuation inhaler   Yes No   Sig: Inhale   ibuprofen (MOTRIN) 200 mg tablet   Yes No   Sig: Take by mouth every 6 (six) hours as needed for mild pain   oxyCODONE (Roxicodone) 5 immediate release tablet   No No   Sig: Take 1 tablet (5 mg total) by mouth every 6 (six) hours as needed for severe pain for up to 8 days Max Daily Amount: 20 mg      Facility-Administered Medications: None       Past Medical History:   Diagnosis Date   • Allergic rhinitis due to allergen 10/01/2018   • Anxiety    • Chronic pain    • COPD (chronic obstructive pulmonary disease) ([address])    • Depression 10/17/2016   • Diarrhea in adult patient    • Hyperlipidemia        Past Surgical History:   Procedure Laterality Date   • EGD AND COLONOSCOPY     • ELBOW ARTHROSCOPY     • ELBOW SURGERY      x2   • IR CHOLECYSTOSTOMY TUBE CHECK/CHANGE/REPOSITION/REINSERTION/UPSIZE  09/11/2023   • IR CHOLECYSTOSTOMY TUBE PLACEMENT  07/28/2023   • AR LAPAROSCOPY SURG CHOLECYSTECTOMY N/A 9/26/2023    Procedure: CHOLECYSTECTOMY LAPAROSCOPIC;  Surgeon: Latha Puga MD;  Location: [facility];  Service: General   • WRIST SURGERY         History reviewed. No pertinent family history. I have reviewed and agree with the history as documented. E-Cigarette/Vaping   • E-Cigarette Use Never User      E-Cigarette/Vaping Substances     Social History     Tobacco Use   • Smoking status: Every Day     Packs/day: 0.50     Years: 30.00     Total pack years: 15.00     Types: Cigarettes     Passive exposure: Never   • Smokeless tobacco: Never   Vaping Use   • Vaping Use: Never used   Substance Use Topics   • Alcohol use: Not Currently   • Drug use: No     Comment: Is in pain mangement,  to get off pain meds. Review of Systems   Constitutional: Negative for chills and fever. HENT: Negative for ear pain and sore throat. Eyes: Negative for pain and visual disturbance. Respiratory: Negative for cough and shortness of breath. Cardiovascular: Negative for chest pain and palpitations. Gastrointestinal: Positive for abdominal pain. Negative for vomiting. Genitourinary: Negative for dysuria and hematuria. Musculoskeletal: Negative for arthralgias and back pain. Skin: Negative for color change and rash. Neurological: Negative for seizures and syncope. All other systems reviewed and are negative.       Physical Exam  Physical Exam  Vitals and nursing note reviewed. Constitutional:       General: He is not in acute distress. Cardiovascular:      Rate and Rhythm: Normal rate and regular rhythm. Pulmonary:      Effort: Pulmonary effort is normal. No respiratory distress. Breath sounds: Normal breath sounds. Abdominal:      General: Abdomen is flat. Bowel sounds are normal. There is no distension. Palpations: Abdomen is soft. Tenderness: There is abdominal tenderness. There is no guarding or rebound. Musculoskeletal:         General: No deformity. Normal range of motion. Skin:     Capillary Refill: Capillary refill takes less than 2 seconds. Findings: Bruising present. No erythema. Neurological:      General: No focal deficit present. Mental Status: He is alert and oriented to person, place, and time.          Vital Signs  ED Triage Vitals [09/29/23 1247]   Temperature Pulse Respirations Blood Pressure SpO2   98 °F (36.7 °C) 92 18 129/78 98 %      Temp Source Heart Rate Source Patient Position - Orthostatic VS BP Location FiO2 (%)   Oral Monitor Standing Left arm --      Pain Score       10 - Worst Possible Pain           Vitals:    09/29/23 1247 09/29/23 1645   BP: 129/78    Pulse: 92 67   Patient Position - Orthostatic VS: Standing Lying         Visual Acuity      ED Medications  Medications   sodium chloride 0.9 % bolus 1,000 mL (0 mL Intravenous Stopped 9/29/23 1518)   HYDROmorphone (DILAUDID) injection 0.5 mg (0.5 mg Intravenous Given 9/29/23 1326)   iohexol (OMNIPAQUE) 350 MG/ML injection (MULTI-DOSE) 100 mL (100 mL Intravenous Given 9/29/23 1355)       Diagnostic Studies  Results Reviewed     Procedure Component Value Units Date/Time    Basic metabolic panel [[id number]] Collected: 09/29/23 1320    Lab Status: Final result Specimen: Blood from Arm, Right Updated: 09/29/23 1350     Sodium 139 mmol/L      Potassium 3.9 mmol/L      Chloride 101 mmol/L      CO2 32 mmol/L      ANION GAP 6 mmol/L BUN 13 mg/dL      Creatinine 0.85 mg/dL      Glucose 90 mg/dL      Calcium 9.5 mg/dL      eGFR 104 ml/min/1.73sq m     Narrative:      [place] guidelines for Chronic Kidney Disease (CKD):   •  Stage 1 with normal or high GFR (GFR > 90 mL/min/1.73 square meters)  •  Stage 2 Mild CKD (GFR = 60-89 mL/min/1.73 square meters)  •  Stage 3A Moderate CKD (GFR = 45-59 mL/min/1.73 square meters)  •  Stage 3B Moderate CKD (GFR = 30-44 mL/min/1.73 square meters)  •  Stage 4 Severe CKD (GFR = 15-29 mL/min/1.73 square meters)  •  Stage 5 End Stage CKD (GFR <15 mL/min/1.73 square meters)  Note: GFR calculation is accurate only with a steady state creatinine    Hepatic function panel [[id number]]  (Normal) Collected: 09/29/23 1320    Lab Status: Final result Specimen: Blood from Arm, Right Updated: 09/29/23 1350     Total Bilirubin 0.40 mg/dL      Bilirubin, Direct 0.07 mg/dL      Alkaline Phosphatase 84 U/L      AST 13 U/L      ALT 20 U/L      Total Protein 7.1 g/dL      Albumin 4.2 g/dL     Lipase [[id number]]  (Normal) Collected: 09/29/23 1320    Lab Status: Final result Specimen: Blood from Arm, Right Updated: 09/29/23 1350     Lipase 18 u/L     UA (URINE) with reflex to Scope [[id number]] Collected: 09/29/23 1315    Lab Status: Final result Specimen: Urine, Clean Catch Updated: 09/29/23 1334     Color, UA Yellow     Clarity, UA Clear     Specific Gravity, UA 1.015     pH, UA 6.0     Leukocytes, UA Negative     Nitrite, UA Negative     Protein, UA Negative mg/dl      Glucose, UA Negative mg/dl      Ketones, UA Negative mg/dl      Urobilinogen, UA 0.2 E.U./dl      Bilirubin, UA Negative     Occult Blood, UA Negative    CBC and differential [[id number]]  (Abnormal) Collected: 09/29/23 1320    Lab Status: Final result Specimen: Blood from Arm, Right Updated: 09/29/23 1333     WBC 10.87 Thousand/uL      RBC 5.03 Million/uL      Hemoglobin 14.6 g/dL      Hematocrit 44.8 %      MCV 89 fL      MCH 29.0 pg MCHC 32.6 g/dL      RDW 13.6 %      MPV 10.3 fL      Platelets 173 Thousands/uL      nRBC 0 /100 WBCs      Neutrophils Relative 62 %      Immat GRANS % 1 %      Lymphocytes Relative 26 %      Monocytes Relative 8 %      Eosinophils Relative 2 %      Basophils Relative 1 %      Neutrophils Absolute 6.76 Thousands/µL      Immature Grans Absolute 0.07 Thousand/uL      Lymphocytes Absolute 2.82 Thousands/µL      Monocytes Absolute 0.84 Thousand/µL      Eosinophils Absolute 0.26 Thousand/µL      Basophils Absolute 0.12 Thousands/µL                  CT abdomen pelvis with contrast   Final Result by Ricardo Robles MD (09/29 1527)      1. Status post recent cholecystectomy with complex air and fluid collection within the gallbladder fossa. Given surgery was only 3 days ago, this may represent an anticipated postoperative finding. Infection or less likely possibility of bile leak (as    this would not explain the presence of air) is however not excluded given patient's symptomatology. 2. Moderately increased stool within the colon. 3. Bilateral spondylolysis, minimal anterolisthesis and degenerative disc disease, L5-S1      The study was marked in EPIC for immediate notification. Workstation performed: [id number]                    Procedures  Procedures         ED Course                               SBIRT 20yo+    Flowsheet Row Most Recent Value   Initial Alcohol Screen: US AUDIT-C     1. How often do you have a drink containing alcohol? 0 Filed at: 09/29/2023 1249   Audit-C Score 0 Filed at: 09/29/2023 1249   STEVENSON: How many times in the past year have you. .. Used an illegal drug or used a prescription medication for non-medical reasons? Never Filed at: 09/29/2023 1249                    Medical Decision Making  Pulse ox 98% on room air indicating adequate oxygenation. General surgery consulted.   Surgical  KIRT Todd at bedside to examine the patient recommend additional medications for constipation not feel this represented a bile leak or postoperative infection. No further prescription for pain at this time. Patient safe for discharge. Post-operative pain: acute illness or injury  Amount and/or Complexity of Data Reviewed  Labs: ordered. Radiology: ordered. Risk  Prescription drug management. Disposition  Final diagnoses:   Post-operative pain     Time reflects when diagnosis was documented in both MDM as applicable and the Disposition within this note     Time User Action Codes Description Comment    9/29/2023  4:01 PM Brunswickcuate Claros Add [G89.18] Post-operative pain     9/29/2023  4:13 PM Aviva Mancuso Add [K59.00] Constipation, unspecified constipation type       ED Disposition     ED Disposition   Discharge    Condition   Stable    Date/Time   Fri Sep 29, 2023  4:24 PM    Comment   Scott Weeks discharge to home/self care. Follow-up Information     Follow up With Specialties Details Why Contact Info    Senia Hay MD General Surgery  as scheduled [facility] [address]  [phone]            Discharge Medication List as of 9/29/2023  4:29 PM      START taking these medications    Details   bisacodyl (DULCOLAX) 10 mg suppository Insert 1 suppository (10 mg total) into the rectum daily, Starting Fri 9/29/2023, Normal      magnesium citrate (CITROMA) 1.745 g/30 mL oral solution Take 296 mL by mouth once for 1 dose, Starting Fri 9/29/2023, Normal      !! oxyCODONE (Roxicodone) 5 immediate release tablet Take 1 tablet (5 mg total) by mouth every 6 (six) hours as needed for severe pain for up to 4 days Max Daily Amount: 20 mg, Starting Fri 9/29/2023, Until Tue 10/3/2023 at 2359, Normal       !! - Potential duplicate medications found. Please discuss with provider.       CONTINUE these medications which have NOT CHANGED    Details   albuterol (PROVENTIL HFA,VENTOLIN HFA) 90 mcg/act inhaler INL 2 PFS PO Q 4 H PRF [facility], [facility] Med ALPRAZolam (XANAX) 1 mg tablet TAKE 1 TABLET BY MOUTH THREE TIMES A DAY AS NEEDED, Historical Med      atorvastatin (LIPITOR) 40 mg tablet Take 40 mg by mouth daily, Starting Fri 8/4/2023, Historical Med      DULoxetine (CYMBALTA) 60 mg delayed release capsule Take 60 mg by mouth daily, Historical Med      fluticasone-umeclidinium-vilanterol 100-62.5-25 mcg/actuation inhaler Inhale, Historical Med      ibuprofen (MOTRIN) 200 mg tablet Take by mouth every 6 (six) hours as needed for mild pain, Historical Med      !! oxyCODONE (Roxicodone) 5 immediate release tablet Take 1 tablet (5 mg total) by mouth every 6 (six) hours as needed for severe pain for up to 8 days Max Daily Amount: 20 mg, Starting Tue 9/26/2023, Until Wed 10/4/2023 at 2359, Normal       !! - Potential duplicate medications found. Please discuss with provider. No discharge procedures on file.     PDMP Review     None          ED Provider  Electronically Signed by           Ani Rao DO  09/29/23 6476

## 2023-10-11 ENCOUNTER — OFFICE VISIT (OUTPATIENT)
Dept: SURGERY | Facility: CLINIC | Age: 48
End: 2023-10-11

## 2023-10-11 VITALS — TEMPERATURE: 97.5 F | WEIGHT: 186.8 LBS | HEIGHT: 73 IN | BODY MASS INDEX: 24.76 KG/M2

## 2023-10-11 DIAGNOSIS — Z09 POSTOP CHECK: Primary | ICD-10-CM

## 2023-10-11 PROCEDURE — 99024 POSTOP FOLLOW-UP VISIT: CPT | Performed by: SPECIALIST

## 2023-10-11 NOTE — PROGRESS NOTES
GENERAL SURGERY POST-OP NOTE  Marin Litten   MRN: [id number]   : 1975  10/11/2023  Chief Complaint   Patient presents with   • Post-op     Lap Jennifer     Assessment/Plan   Diagnoses and all orders for this visit:    Abby amado    Patient is doing well status post-operative laparoscopic cholecystectomy. Post-operative care restrictions discussed. May return to work when ready. he is progressing nicely. I will see him back on an as needed basis. Subjective   The patient is a 57 y.o. male who presents for routine post-operative follow up status post open cholecystectomy. He denies any present complaints. Activity level has been appropriate. He is tolerating a regular diet. Pain is well-controlled with current therapy.     The following portions of the patient's history were reviewed by a provider in this encounter and updated as appropriate:    No text in SmartText           Objective   Physical Exam:  Temp 97.5 °F (36.4 °C) (Core)   Ht 6' 1" (1.854 m)   Wt 84.7 kg (186 lb 12.8 oz)   BMI 24.65 kg/m²   Constitutional: not in acute distress, well developed and well nourished  Abdomen: soft, bowel sounds active, non-tender, no abnormal masses  Incision: healing well, no drainage, no erythema, well approximated  Tenderness at incision site: mild    Current Outpatient Medications   Medication Sig Dispense Refill   • albuterol (PROVENTIL HFA,VENTOLIN HFA) 90 mcg/act inhaler INL 2 PFS PO Q 4 H PRF WHZ  3   • ALPRAZolam (XANAX) 1 mg tablet TAKE 1 TABLET BY MOUTH THREE TIMES A DAY AS NEEDED     • ibuprofen (MOTRIN) 200 mg tablet Take by mouth every 6 (six) hours as needed for mild pain     • atorvastatin (LIPITOR) 40 mg tablet Take 40 mg by mouth daily (Patient not taking: Reported on 10/11/2023)     • bisacodyl (DULCOLAX) 10 mg suppository Insert 1 suppository (10 mg total) into the rectum daily (Patient not taking: Reported on 10/11/2023) 12 suppository 0   • DULoxetine (CYMBALTA) 60 mg delayed release capsule Take 60 mg by mouth daily (Patient not taking: Reported on 10/11/2023)     • fluticasone-umeclidinium-vilanterol 100-62.5-25 mcg/actuation inhaler Inhale (Patient not taking: Reported on 10/11/2023)     • magnesium citrate (CITROMA) 1.745 g/30 mL oral solution Take 296 mL by mouth once for 1 dose 296 mL 0     No current facility-administered medications for this visit.      Zosyn [piperacillin sod-tazobactam so]   Past Medical History:   Diagnosis Date   • Allergic rhinitis due to allergen 10/01/2018   • Anxiety    • Chronic pain    • COPD (chronic obstructive pulmonary disease) ([address])    • Depression 10/17/2016   • Diarrhea in adult patient    • Hyperlipidemia        Claudell Gunther, MD

## 2023-10-24 ENCOUNTER — OFFICE VISIT (OUTPATIENT)
Dept: SURGERY | Facility: CLINIC | Age: 48
End: 2023-10-24
Payer: COMMERCIAL

## 2023-10-24 VITALS
TEMPERATURE: 97 F | SYSTOLIC BLOOD PRESSURE: 115 MMHG | BODY MASS INDEX: 24.8 KG/M2 | HEART RATE: 88 BPM | DIASTOLIC BLOOD PRESSURE: 81 MMHG | WEIGHT: 188 LBS | RESPIRATION RATE: 96 BRPM

## 2023-10-24 DIAGNOSIS — Z09 SURGICAL FOLLOW-UP CARE: Primary | ICD-10-CM

## 2023-10-24 PROCEDURE — 99024 POSTOP FOLLOW-UP VISIT: CPT | Performed by: SURGERY

## 2023-10-24 NOTE — PROGRESS NOTES
Patient is status post laparoscopic cholecystectomy 26 September 2023 after previous percutaneous cholecystostomy tube placement. Surgery went well and patient was discharged. Presented to the emergency department on 29 September 2023 with complaint of abdominal discomfort and constipation. CBC and CMP were largely unremarkable. A CT scan was performed:  MPRESSION:  1. Status post recent cholecystectomy with complex air and fluid collection within the gallbladder fossa. Given surgery was only 3 days ago, this may represent an anticipated postoperative finding. Infection or less likely possibility of bile leak (as   this would not explain the presence of air) is however not excluded given patient's symptomatology. 2. Moderately increased stool within the colon. 3. Bilateral spondylolysis, minimal anterolisthesis and degenerative disc disease, L5-S1     Patient was evaluated by general surgery service and felt to have diffuse abdominal discomfort secondary to his constipation and was treated for this and discharged from the ED. He presents today stating that he feels tired. Denies fevers, chills, nausea, vomiting, abdominal distention or abdominal pain. He denies abnormal bowel movements. Exam, his abdomen is soft, nondistended, nontender and his incisions are healed nicely. I counseled him that he has no complications from his surgery evident by history or physical exam.  He is free to return to all normal duties including work and gym activities. Urged him to be more active and less sedentary in his daily life.

## 2023-11-09 RX ORDER — SODIUM CHLORIDE 9 MG/ML
INJECTION INTRAVENOUS
Qty: 900 ML | Refills: 0 | OUTPATIENT
Start: 2023-11-09

## 2024-01-25 ENCOUNTER — HOSPITAL ENCOUNTER (EMERGENCY)
Facility: HOSPITAL | Age: 49
Discharge: HOME | End: 2024-01-25
Attending: EMERGENCY MEDICINE
Payer: COMMERCIAL

## 2024-01-25 VITALS
HEART RATE: 85 BPM | RESPIRATION RATE: 18 BRPM | WEIGHT: 198.8 LBS | DIASTOLIC BLOOD PRESSURE: 76 MMHG | BODY MASS INDEX: 26.23 KG/M2 | SYSTOLIC BLOOD PRESSURE: 149 MMHG | TEMPERATURE: 97.8 F | OXYGEN SATURATION: 98 %

## 2024-01-25 DIAGNOSIS — M62.830 MUSCLE SPASM OF BACK: Primary | ICD-10-CM

## 2024-01-25 PROCEDURE — 96372 THER/PROPH/DIAG INJ SC/IM: CPT

## 2024-01-25 PROCEDURE — 99284 EMERGENCY DEPT VISIT MOD MDM: CPT | Performed by: PHYSICIAN ASSISTANT

## 2024-01-25 PROCEDURE — 99282 EMERGENCY DEPT VISIT SF MDM: CPT

## 2024-01-25 RX ORDER — CYCLOBENZAPRINE HCL 10 MG
10 TABLET ORAL 3 TIMES DAILY PRN
Qty: 12 TABLET | Refills: 0 | Status: SHIPPED | OUTPATIENT
Start: 2024-01-25 | End: 2024-01-29

## 2024-01-25 RX ORDER — KETOROLAC TROMETHAMINE 30 MG/ML
15 INJECTION, SOLUTION INTRAMUSCULAR; INTRAVENOUS ONCE
Status: COMPLETED | OUTPATIENT
Start: 2024-01-25 | End: 2024-01-25

## 2024-01-25 RX ORDER — PREDNISONE 20 MG/1
40 TABLET ORAL DAILY
Qty: 8 TABLET | Refills: 0 | Status: SHIPPED | OUTPATIENT
Start: 2024-01-25 | End: 2024-01-29

## 2024-01-25 RX ORDER — PREDNISONE 20 MG/1
60 TABLET ORAL ONCE
Status: COMPLETED | OUTPATIENT
Start: 2024-01-25 | End: 2024-01-25

## 2024-01-25 RX ORDER — NAPROXEN 500 MG/1
500 TABLET ORAL 2 TIMES DAILY WITH MEALS
Qty: 14 TABLET | Refills: 0 | Status: SHIPPED | OUTPATIENT
Start: 2024-01-25 | End: 2024-02-01

## 2024-01-25 RX ADMIN — PREDNISONE 60 MG: 20 TABLET ORAL at 09:38

## 2024-01-25 RX ADMIN — KETOROLAC TROMETHAMINE 15 MG: 30 INJECTION, SOLUTION INTRAMUSCULAR; INTRAVENOUS at 09:38

## 2024-01-25 NOTE — ED PROVIDER NOTES
History  Chief Complaint   Patient presents with    Back Pain     C/o low back pain and spasms radiating down R leg. Denies specific injury but states he is back to work after being on disability for 2 years. Took motrin at 6a with no relief to pain.     48-year-old male presenting today with right-sided lower back pain that began approximately 2 weeks ago which is gradually worsened.  Relays that over the past 2 years he was on disability and return to work 2 weeks ago where he does a lot of standing, has not had any falls or injuries.  States that whenever he rests for a long period of time the back pain then worsens however when he is ambulatory it seems to alleviate some of his discomfort.  Pain radiating to the right outer hip, does not extend out to the toes, does not accompanied with any new numbness or paresthesias.  Denies nausea, vomiting, changes in urination, abdominal pain, bladder or bowel retention or incontinence, saddle anesthesias etc.        Prior to Admission Medications   Prescriptions Last Dose Informant Patient Reported? Taking?   ALPRAZolam (XANAX) 1 mg tablet   Yes No   Sig: TAKE 1 TABLET BY MOUTH THREE TIMES A DAY AS NEEDED   DULoxetine (CYMBALTA) 60 mg delayed release capsule   Yes No   Sig: Take 60 mg by mouth daily   Patient not taking: Reported on 10/11/2023   albuterol (PROVENTIL HFA,VENTOLIN HFA) 90 mcg/act inhaler   Yes No   Sig: INL 2 PFS PO Q 4 H PRF WHZ   atorvastatin (LIPITOR) 40 mg tablet   Yes No   Sig: Take 40 mg by mouth daily   Patient not taking: Reported on 10/11/2023   bisacodyl (DULCOLAX) 10 mg suppository   No No   Sig: Insert 1 suppository (10 mg total) into the rectum daily   Patient not taking: Reported on 10/11/2023   fluticasone-umeclidinium-vilanterol 100-62.5-25 mcg/actuation inhaler   Yes No   Sig: Inhale   Patient not taking: Reported on 10/11/2023   ibuprofen (MOTRIN) 200 mg tablet   Yes No   Sig: Take by mouth every 6 (six) hours as needed for mild pain    magnesium citrate (CITROMA) 1.745 g/30 mL oral solution   No No   Sig: Take 296 mL by mouth once for 1 dose      Facility-Administered Medications: None       Past Medical History:   Diagnosis Date    Allergic rhinitis due to allergen 10/01/2018    Anxiety     Chronic pain     COPD (chronic obstructive pulmonary disease) (HCC)     Depression 10/17/2016    Diarrhea in adult patient     Hyperlipidemia        Past Surgical History:   Procedure Laterality Date    EGD AND COLONOSCOPY      ELBOW ARTHROSCOPY      ELBOW SURGERY      x2    IR CHOLECYSTOSTOMY TUBE CHECK/CHANGE/REPOSITION/REINSERTION/UPSIZE  09/11/2023    IR CHOLECYSTOSTOMY TUBE PLACEMENT  07/28/2023    [place] LAPAROSCOPY SURG CHOLECYSTECTOMY N/A 9/26/2023    Procedure: CHOLECYSTECTOMY LAPAROSCOPIC;  Surgeon: Nolberto Cooper MD;  Location: [facility];  Service: General    WRIST SURGERY         History reviewed. No pertinent family history.  I have reviewed and agree with the history as documented.    E-Cigarette/Vaping    E-Cigarette Use Never User      E-Cigarette/Vaping Substances    Nicotine No     THC No     CBD No     Flavoring No     Other No     Unknown No      Social History     Tobacco Use    Smoking status: Every Day     Current packs/day: 0.50     Average packs/day: 0.5 packs/day for 30.0 years (15.0 ttl pk-yrs)     Types: Cigarettes     Passive exposure: Never    Smokeless tobacco: Never   Vaping Use    Vaping status: Never Used   Substance Use Topics    Alcohol use: Not Currently    Drug use: No     Comment: Is in pain mangement,  to get off pain meds.       Review of Systems   Constitutional: Negative.  Negative for chills, fever and unexpected weight change.        Denies IV drug use     HENT: Negative.     Eyes: Negative.    Respiratory: Negative.  Negative for cough, chest tightness, shortness of breath and wheezing.    Cardiovascular: Negative.  Negative for chest pain and palpitations.   Gastrointestinal: Negative.  Negative for abdominal pain,  constipation, diarrhea, nausea and vomiting.   Genitourinary: Negative.  Negative for difficulty urinating, dysuria, flank pain, frequency, hematuria and urgency.        Denies numbness, tingling in the groin.    Musculoskeletal:  Positive for back pain. Negative for arthralgias, gait problem, joint swelling, myalgias, neck pain and neck stiffness.   Skin: Negative.  Negative for color change.   Neurological: Negative.  Negative for dizziness, tremors, weakness, light-headedness, numbness and headaches.   All other systems reviewed and are negative.      Physical Exam  Physical Exam  Vitals and nursing note reviewed.   Constitutional:       General: He is not in acute distress.     Appearance: Normal appearance. He is well-developed and normal weight. He is not diaphoretic.   HENT:      Head: Normocephalic and atraumatic.      Right Ear: External ear normal.      Left Ear: External ear normal.      Nose: Nose normal.      Mouth/Throat:      Pharynx: No oropharyngeal exudate.   Eyes:      General: No scleral icterus.        Right eye: No discharge.         Left eye: No discharge.      Conjunctiva/sclera: Conjunctivae normal.      Pupils: Pupils are equal, round, and reactive to light.   Cardiovascular:      Rate and Rhythm: Normal rate and regular rhythm.      Pulses: Normal pulses.      Heart sounds: Normal heart sounds. No murmur heard.     No friction rub. No gallop.   Pulmonary:      Effort: Pulmonary effort is normal. No respiratory distress.      Breath sounds: Normal breath sounds. No stridor. No wheezing, rhonchi or rales.   Chest:      Chest wall: No tenderness.   Abdominal:      General: Bowel sounds are normal. There is no distension.      Palpations: Abdomen is soft. There is no mass.      Tenderness: There is no abdominal tenderness. There is no guarding or rebound.      Hernia: No hernia is present.   Musculoskeletal:         General: Normal range of motion.        Arms:       Cervical back: Normal  range of motion and neck supple.   Lymphadenopathy:      Cervical: No cervical adenopathy.   Skin:     General: Skin is warm and dry.      Capillary Refill: Capillary refill takes less than 2 seconds.      Coloration: Skin is not jaundiced or pale.      Findings: No bruising, erythema, lesion or rash.   Neurological:      General: No focal deficit present.      Mental Status: He is alert and oriented to person, place, and time. Mental status is at baseline.   Psychiatric:         Thought Content: Thought content normal.         Judgment: Judgment normal.         Vital Signs  ED Triage Vitals [01/25/24 0919]   Temperature Pulse Respirations Blood Pressure SpO2   97.8 °F (36.6 °C) 85 18 149/76 98 %      Temp Source Heart Rate Source Patient Position - Orthostatic VS BP Location FiO2 (%)   Oral Monitor Sitting Left arm --      Pain Score       10 - Worst Possible Pain           Vitals:    01/25/24 0919   BP: 149/76   Pulse: 85   Patient Position - Orthostatic VS: Sitting         Visual Acuity      ED Medications  Medications   predniSONE tablet 60 mg (60 mg Oral Given 1/25/24 0938)   ketorolac (TORADOL) injection 15 mg (15 mg Intramuscular Given 1/25/24 0938)       Diagnostic Studies  Results Reviewed       None                   No orders to display              Procedures  Procedures         ED Course                                             Medical Decision Making  Will treat for lumbosacral strain, informed not to drive or bring her machine while taking Flexeril, will have patient follow-up with Workmen's Comp. or PCP etc.     Patient is informed to return to the emergency department for worsening of symptoms and was given proper education regarding their diagnosis and symptoms. Otherwise the patient is informed to follow up with their primary care doctor for re-evaluation. The patient verbalizes understanding and agrees with above assessment and plan. All questions were answered.        Risk  Prescription drug  management.             Disposition  Final diagnoses:   Muscle spasm of back     Time reflects when diagnosis was documented in both MDM as applicable and the Disposition within this note       Time User Action Codes Description Comment    1/25/2024  9:30 AM Genet Harrell [M62.830] Muscle spasm of back           ED Disposition       ED Disposition   Discharge    Condition   Stable    Date/Time   Thu Jan 25, 2024  9:30 AM    Comment   Humberto De Guzman discharge to home/self care.                   Follow-up Information       Follow up With Specialties Details Why Contact Info Additional Information    [facility] Emergency Department Emergency Medicine Go to  If symptoms worsen, otherwise please follow up with your family doctor [address] [place] [id number]  [phone] [facility] Emergency Department, [address], [place], [address]            Discharge Medication List as of 1/25/2024  9:31 AM        START taking these medications    Details   cyclobenzaprine (FLEXERIL) 10 mg tablet Take 1 tablet (10 mg total) by mouth 3 (three) times a day as needed for muscle spasms for up to 4 days, Starting u 1/25/2024, Until Mon 1/29/2024 at 2359, Normal      naproxen (NAPROSYN) 500 mg tablet Take 1 tablet (500 mg total) by mouth 2 (two) times a day with meals for 7 days, Starting u 1/25/2024, Until Thu 2/1/2024, Normal      predniSONE 20 mg tablet Take 2 tablets (40 mg total) by mouth daily for 4 days, Starting u 1/25/2024, Until Mon 1/29/2024, Normal           CONTINUE these medications which have NOT CHANGED    Details   albuterol (PROVENTIL HFA,VENTOLIN HFA) 90 mcg/act inhaler INL 2 PFS PO Q 4 H PRF WHZ, Historical Med      ALPRAZolam (XANAX) 1 mg tablet TAKE 1 TABLET BY MOUTH THREE TIMES A DAY AS NEEDED, Historical Med      atorvastatin (LIPITOR) 40 mg tablet Take 40 mg by mouth daily, Starting Fri 8/4/2023, Historical Med      bisacodyl  (DULCOLAX) 10 mg suppository Insert 1 suppository (10 mg total) into the rectum daily, Starting Fri 9/29/2023, Normal      DULoxetine (CYMBALTA) 60 mg delayed release capsule Take 60 mg by mouth daily, Historical Med      fluticasone-umeclidinium-vilanterol 100-62.5-25 mcg/actuation inhaler Inhale, Historical Med      ibuprofen (MOTRIN) 200 mg tablet Take by mouth every 6 (six) hours as needed for mild pain, Historical Med      magnesium citrate (CITROMA) 1.745 g/30 mL oral solution Take 296 mL by mouth once for 1 dose, Starting Fri 9/29/2023, Normal             No discharge procedures on file.    PDMP Review       None            ED Provider  Electronically Signed by             Genet Harrell PA-C  01/25/24 0508

## 2024-01-25 NOTE — Clinical Note
Humberto De Guzman was seen and treated in our emergency department on 1/25/2024.                Diagnosis: muscle spasm and strain of lumbar spine    Humberto  may return to work on return date.    He may return on this date: 01/29/2024         If you have any questions or concerns, please don't hesitate to call.      Genet Harrell PA-C    ______________________________           _______________          _______________  Hospital Representative                              Date                                Time

## 2024-02-08 ENCOUNTER — OFFICE VISIT (OUTPATIENT)
Age: 49
End: 2024-02-08
Payer: COMMERCIAL

## 2024-02-08 ENCOUNTER — APPOINTMENT (OUTPATIENT)
Dept: RADIOLOGY | Facility: CLINIC | Age: 49
End: 2024-02-08
Payer: COMMERCIAL

## 2024-02-08 VITALS
BODY MASS INDEX: 26.24 KG/M2 | WEIGHT: 198 LBS | HEIGHT: 73 IN | HEART RATE: 94 BPM | DIASTOLIC BLOOD PRESSURE: 78 MMHG | SYSTOLIC BLOOD PRESSURE: 143 MMHG

## 2024-02-08 DIAGNOSIS — M72.2 PLANTAR FASCIITIS: Primary | ICD-10-CM

## 2024-02-08 DIAGNOSIS — G57.51 TARSAL TUNNEL SYNDROME OF RIGHT SIDE: ICD-10-CM

## 2024-02-08 DIAGNOSIS — M76.71 PERONEAL TENDONITIS OF RIGHT LOWER LEG: ICD-10-CM

## 2024-02-08 DIAGNOSIS — M72.2 PLANTAR FASCIITIS: ICD-10-CM

## 2024-02-08 PROCEDURE — 73610 X-RAY EXAM OF ANKLE: CPT

## 2024-02-08 PROCEDURE — 99204 OFFICE O/P NEW MOD 45 MIN: CPT | Performed by: STUDENT IN AN ORGANIZED HEALTH CARE EDUCATION/TRAINING PROGRAM

## 2024-02-08 PROCEDURE — 73630 X-RAY EXAM OF FOOT: CPT

## 2024-02-08 RX ORDER — MELOXICAM 15 MG/1
15 TABLET ORAL DAILY
Qty: 30 TABLET | Refills: 0 | Status: SHIPPED | OUTPATIENT
Start: 2024-02-08

## 2024-02-08 NOTE — PROGRESS NOTES
This patient was seen on 2/8/2024.    My role is Foot , Ankle, and Wound Specialist    ASSESSMENT     Diagnoses and all orders for this visit:    Plantar fasciitis  -     X-ray foot right 3+ views; Future  -     Cancel: X-ray foot left 3+ views; Future  -     XR ankle 3+ vw right; Future  -     meloxicam (Mobic) 15 mg tablet; Take 1 tablet (15 mg total) by mouth daily    Peroneal tendonitis of right lower leg  -     meloxicam (Mobic) 15 mg tablet; Take 1 tablet (15 mg total) by mouth daily  -     Ankle Cude ankle/Ankle Brace    Tarsal tunnel syndrome of right side         Problem List Items Addressed This Visit          Musculoskeletal and Integument    Peroneal tendonitis of right lower leg    Relevant Medications    meloxicam (Mobic) 15 mg tablet    Other Relevant Orders    Ankle Cude ankle/Ankle Brace     Other Visit Diagnoses       Plantar fasciitis    -  Primary    Relevant Medications    meloxicam (Mobic) 15 mg tablet    Other Relevant Orders    X-ray foot right 3+ views    XR ankle 3+ vw right    Tarsal tunnel syndrome of right side              PLAN  -Humberto and I discussed his right foot  -Rx meloxicam  -Rx ASO brace  -Recommend supportive sneakers with over-the-counter inserts  -Recommend Voltaren gel  -Return to clinic 4 weeks, we will consider physical therapy and possibly advanced imaging at this time.    X-ray of the right ankle from 2/8/2024 interpreted independently: No acute osseous abnormality noted.  No abnormalities noted within the soft tissues.    X-ray of the right foot from 2/8/2024 interpreted independently: No acute osseous abnormality noted.  No abnormalities noted within the soft tissues.    SUBJECTIVE    Chief Complaint:  Right foot pain     Patient ID: Humberto Valientedavina     2/8/2024: Humberto is a pleasant 48-year-old male who presents today with right foot pain.  He states that this been bothering him since January when he tried to go back to work.  He denies any injury to the area.  He  "states it hurts pretty much all the time although it is worse with pressure/weightbearing that is at rest.  Thus far he has not tried Motrin and Tylenol as well as ice with little to no relief.        The following portions of the patient's history were reviewed and updated as appropriate: allergies, current medications, past family history, past medical history, past social history, past surgical history and problem list.    Review of Systems   Constitutional: Negative.    Respiratory: Negative.     Cardiovascular: Negative.    Gastrointestinal: Negative.    Genitourinary: Negative.    Musculoskeletal:  Positive for arthralgias and myalgias.   Skin: Negative.          OBJECTIVE      /78   Pulse 94   Ht 6' 1\" (1.854 m)   Wt 89.8 kg (198 lb)   BMI 26.12 kg/m²        Physical Exam  Constitutional:       Appearance: Normal appearance.   HENT:      Head: Normocephalic and atraumatic.   Eyes:      General:         Right eye: No discharge.         Left eye: No discharge.   Cardiovascular:      Rate and Rhythm: Normal rate and regular rhythm.      Pulses:           Dorsalis pedis pulses are 2+ on the right side and 2+ on the left side.        Posterior tibial pulses are 2+ on the right side and 2+ on the left side.   Pulmonary:      Effort: Pulmonary effort is normal.      Breath sounds: Normal breath sounds.   Skin:     General: Skin is warm.      Capillary Refill: Capillary refill takes less than 2 seconds.   Neurological:      Mental Status: He is alert and oriented to person, place, and time.      Sensory: Sensation is intact. No sensory deficit.   Psychiatric:         Mood and Affect: Mood normal.         Vascular:  -DP and PT pulses intact b/l  -Capillary refill time <2 sec b/l  -Digital hair growth: Present  -Skin temp: WNL    MSK:  -Pain on palpation noted to the right ankle at the location of the peroneal tendons, sinus tarsi, anterior ankle, medial ankle gutter, along the course the posterior tibial " tendon  -Pain with plantarflexion and eversion against resistance  -No gross deformities noted   -MMT is 5/5 to all muscle compartments of the lower extremity  -Ankle dorsiflexion <10 degrees with knee extended, this is improved with knee flexion    Neuro:  -Light sensation intact bilaterally  -Protective sensation intact bilaterally  -Tinel sign positive to the tibial nerve    Derm:  -No lesions, abrasions, or open wounds noted  -No noted interdigital maceration, peeling, malodor  -No callus formation noted on exam

## 2024-02-15 ENCOUNTER — OFFICE VISIT (OUTPATIENT)
Age: 49
End: 2024-02-15
Payer: COMMERCIAL

## 2024-02-15 ENCOUNTER — TELEPHONE (OUTPATIENT)
Age: 49
End: 2024-02-15

## 2024-02-15 VITALS
DIASTOLIC BLOOD PRESSURE: 86 MMHG | HEART RATE: 73 BPM | SYSTOLIC BLOOD PRESSURE: 131 MMHG | HEIGHT: 73 IN | WEIGHT: 198 LBS | BODY MASS INDEX: 26.24 KG/M2

## 2024-02-15 DIAGNOSIS — M76.71 PERONEAL TENDONITIS OF RIGHT LOWER LEG: ICD-10-CM

## 2024-02-15 DIAGNOSIS — M25.571 SINUS TARSITIS OF RIGHT FOOT: ICD-10-CM

## 2024-02-15 DIAGNOSIS — M72.2 PLANTAR FASCIITIS: Primary | ICD-10-CM

## 2024-02-15 PROCEDURE — 20600 DRAIN/INJ JOINT/BURSA W/O US: CPT | Performed by: STUDENT IN AN ORGANIZED HEALTH CARE EDUCATION/TRAINING PROGRAM

## 2024-02-15 RX ADMIN — TRIAMCINOLONE ACETONIDE 40 MG: 40 INJECTION, SUSPENSION INTRA-ARTICULAR; INTRAMUSCULAR at 16:00

## 2024-02-15 RX ADMIN — LIDOCAINE HYDROCHLORIDE 1 ML: 10 INJECTION, SOLUTION EPIDURAL; INFILTRATION; INTRACAUDAL; PERINEURAL at 16:00

## 2024-02-15 NOTE — TELEPHONE ENCOUNTER
Caller: Humberto De Guzman    Doctor/Office: Dr. Banks/[facility]#: [phone]    Escalation: Care Patient is still in a lot of pain even though he is doing what the dr told him to do at last office visit. Requesting a return call from clinical staff. Thank you

## 2024-02-15 NOTE — TELEPHONE ENCOUNTER
Lvm for pt to call back to schedule sooner appt to discuss and get possible injection if that is something he is interested in.

## 2024-02-17 RX ORDER — TRIAMCINOLONE ACETONIDE 40 MG/ML
40 INJECTION, SUSPENSION INTRA-ARTICULAR; INTRAMUSCULAR
Status: SHIPPED | OUTPATIENT
Start: 2024-02-15

## 2024-02-17 RX ORDER — TRIAMCINOLONE ACETONIDE 40 MG/ML
40 INJECTION, SUSPENSION INTRA-ARTICULAR; INTRAMUSCULAR ONCE
Status: SHIPPED | OUTPATIENT
Start: 2024-02-17

## 2024-02-17 RX ORDER — LIDOCAINE HYDROCHLORIDE 10 MG/ML
1 INJECTION, SOLUTION EPIDURAL; INFILTRATION; INTRACAUDAL; PERINEURAL ONCE
Status: COMPLETED | OUTPATIENT
Start: 2024-02-17 | End: 2024-02-15

## 2024-02-17 RX ORDER — DEXAMETHASONE SODIUM PHOSPHATE 4 MG/ML
4 INJECTION, SOLUTION INTRA-ARTICULAR; INTRALESIONAL; INTRAMUSCULAR; INTRAVENOUS; SOFT TISSUE ONCE
Status: SHIPPED | OUTPATIENT
Start: 2024-02-17

## 2024-02-18 NOTE — PROGRESS NOTES
"This patient was seen on  2/15/24 .    My role is Foot , Ankle, and Wound Specialist    ASSESSMENT     Diagnoses and all orders for this visit:    Plantar fasciitis  -     Cancel: Ambulatory referral to Physical Therapy; Future    Peroneal tendonitis of right lower leg  -     Cancel: Ambulatory referral to Physical Therapy; Future  -     Ambulatory referral to Physical Therapy; Future    Sinus tarsitis of right foot  -     Ambulatory referral to Physical Therapy; Future  -     lidocaine (PF) (XYLOCAINE-MPF) 1 % injection 1 mL  -     dexamethasone (DECADRON) injection 4 mg  -     triamcinolone acetonide (KENALOG-40) 40 mg/mL injection 40 mg         Problem List Items Addressed This Visit          Musculoskeletal and Integument    Peroneal tendonitis of right lower leg    Relevant Orders    Ambulatory referral to Physical Therapy     Other Visit Diagnoses       Plantar fasciitis    -  Primary    Sinus tarsitis of right foot        Relevant Medications    lidocaine (PF) (XYLOCAINE-MPF) 1 % injection 1 mL (Start on 2/17/2024 10:30 PM)    dexamethasone (DECADRON) injection 4 mg (Start on 2/17/2024 10:30 PM)    triamcinolone acetonide (KENALOG-40) 40 mg/mL injection 40 mg (Start on 2/17/2024 10:30 PM)    Other Relevant Orders    Ambulatory referral to Physical Therapy          PLAN  -Continue meloxicam  -Continue ASO brace  -Continue supportive sneakers with over-the-counter inserts as discussed  -Continue use of Voltaren gel.  -Rx physical therapy for sinus tarsi syndrome  -Corticosteroid injection to left sinus tarsi given today.  This did appear to offer patient immediate relief.  -Return to clinic 4 weeks    Small joint arthrocentesis: L subtalar  Universal Protocol:  Consent: Verbal consent obtained.  Risks and benefits: risks, benefits and alternatives were discussed  Consent given by: patient  Time out: Immediately prior to procedure a \"time out\" was called to verify the correct patient, procedure, equipment, " support staff and site/side marked as required.  Patient understanding: patient states understanding of the procedure being performed  Site marked: the operative site was marked  Required items: required blood products, implants, devices, and special equipment available  Patient identity confirmed: verbally with patient  Supporting Documentation  Indications: pain and joint swelling   Procedure Details  Location: foot - L subtalar  Needle size: 25 G  Ultrasound guidance: no  Approach: dorsal  Medications administered: 1 mL lidocaine (PF) (XYLOCAINE-MPF) injection 1 %; 40 mg triamcinolone acetonide 40 mg/mL (and dexamethasone phosphate 4mg)    Patient tolerance: patient tolerated the procedure well with no immediate complications        SUBJECTIVE    Chief Complaint:  Left foot pain     Patient ID: Humberto De Guzman     2/8/2024: Humberto is a pleasant 48-year-old male who presents today with right foot pain.  He states that this been bothering him since January when he tried to go back to work.  He denies any injury to the area.  He states it hurts pretty much all the time although it is worse with pressure/weightbearing that is at rest.  Thus far he has not tried Motrin and Tylenol as well as ice with little to no relief.    2/15/2024: Humberto made an appointment today stating that he could barely walk on his left foot.  He states that he would like to attempt an injection for his left foot pain.        The following portions of the patient's history were reviewed and updated as appropriate: allergies, current medications, past family history, past medical history, past social history, past surgical history and problem list.    Review of Systems   Constitutional: Negative.    Respiratory: Negative.     Cardiovascular: Negative.    Gastrointestinal: Negative.    Genitourinary: Negative.    Musculoskeletal:  Positive for arthralgias and myalgias.   Skin: Negative.          OBJECTIVE      /86   Pulse 73   Ht 6'  "1\" (1.854 m)   Wt 89.8 kg (198 lb)   BMI 26.12 kg/m²        Physical Exam  Constitutional:       Appearance: Normal appearance.   HENT:      Head: Normocephalic and atraumatic.   Eyes:      General:         Right eye: No discharge.         Left eye: No discharge.   Cardiovascular:      Rate and Rhythm: Normal rate and regular rhythm.      Pulses:           Dorsalis pedis pulses are 2+ on the right side and 2+ on the left side.        Posterior tibial pulses are 2+ on the right side and 2+ on the left side.   Pulmonary:      Effort: Pulmonary effort is normal.      Breath sounds: Normal breath sounds.   Skin:     General: Skin is warm.      Capillary Refill: Capillary refill takes less than 2 seconds.   Neurological:      Mental Status: He is alert and oriented to person, place, and time.      Sensory: Sensation is intact. No sensory deficit.   Psychiatric:         Mood and Affect: Mood normal.         Vascular:  -DP and PT pulses intact b/l  -Capillary refill time <2 sec b/l  -Digital hair growth: Present  -Skin temp: WNL     MSK:  -Pain on palpation noted to the right ankle at the location of the peroneal tendons, sinus tarsi, anterior ankle, medial ankle gutter, along the course the posterior tibial tendon  -Pain with plantarflexion and eversion against resistance  -No gross deformities noted   -MMT is 5/5 to all muscle compartments of the lower extremity  -Ankle dorsiflexion <10 degrees with knee extended, this is improved with knee flexion     Neuro:  -Light sensation intact bilaterally  -Protective sensation intact bilaterally  -Tinel sign positive to the tibial nerve     Derm:  -No lesions, abrasions, or open wounds noted  -No noted interdigital maceration, peeling, malodor  -No callus formation noted on exam        "

## 2024-02-21 PROBLEM — J11.1 INFLUENZA: Status: RESOLVED | Noted: 2019-02-20 | Resolved: 2024-02-21

## 2024-04-21 ENCOUNTER — HOSPITAL ENCOUNTER (EMERGENCY)
Facility: HOSPITAL | Age: 49
Discharge: HOME | End: 2024-04-21
Attending: EMERGENCY MEDICINE
Payer: COMMERCIAL

## 2024-04-21 ENCOUNTER — APPOINTMENT (EMERGENCY)
Dept: RADIOLOGY | Facility: HOSPITAL | Age: 49
End: 2024-04-21
Payer: COMMERCIAL

## 2024-04-21 VITALS
HEART RATE: 73 BPM | RESPIRATION RATE: 18 BRPM | OXYGEN SATURATION: 99 % | BODY MASS INDEX: 25.12 KG/M2 | TEMPERATURE: 96.6 F | WEIGHT: 190.4 LBS | SYSTOLIC BLOOD PRESSURE: 130 MMHG | DIASTOLIC BLOOD PRESSURE: 60 MMHG

## 2024-04-21 DIAGNOSIS — Z51.89 VISIT FOR WOUND CHECK: Primary | ICD-10-CM

## 2024-04-21 LAB
ALBUMIN SERPL BCP-MCNC: 4.4 G/DL (ref 3.5–5)
ALP SERPL-CCNC: 118 U/L (ref 34–104)
ALT SERPL W P-5'-P-CCNC: 14 U/L (ref 7–52)
ANION GAP SERPL CALCULATED.3IONS-SCNC: 8 MMOL/L (ref 4–13)
AST SERPL W P-5'-P-CCNC: 17 U/L (ref 13–39)
BASOPHILS # BLD AUTO: 0.12 THOUSANDS/ÂΜL (ref 0–0.1)
BASOPHILS NFR BLD AUTO: 1 % (ref 0–1)
BILIRUB SERPL-MCNC: 0.58 MG/DL (ref 0.2–1)
BUN SERPL-MCNC: 17 MG/DL (ref 5–25)
CALCIUM SERPL-MCNC: 9.8 MG/DL (ref 8.4–10.2)
CHLORIDE SERPL-SCNC: 99 MMOL/L (ref 96–108)
CO2 SERPL-SCNC: 32 MMOL/L (ref 21–32)
CREAT SERPL-MCNC: 0.99 MG/DL (ref 0.6–1.3)
EOSINOPHIL # BLD AUTO: 0.09 THOUSAND/ÂΜL (ref 0–0.61)
EOSINOPHIL NFR BLD AUTO: 1 % (ref 0–6)
ERYTHROCYTE [DISTWIDTH] IN BLOOD BY AUTOMATED COUNT: 13.1 % (ref 11.6–15.1)
GFR SERPL CREATININE-BSD FRML MDRD: 90 ML/MIN/1.73SQ M
GLUCOSE SERPL-MCNC: 81 MG/DL (ref 65–140)
HCT VFR BLD AUTO: 51.1 % (ref 36.5–49.3)
HGB BLD-MCNC: 16.7 G/DL (ref 12–17)
IMM GRANULOCYTES # BLD AUTO: 0.04 THOUSAND/UL (ref 0–0.2)
IMM GRANULOCYTES NFR BLD AUTO: 0 % (ref 0–2)
LYMPHOCYTES # BLD AUTO: 2.26 THOUSANDS/ÂΜL (ref 0.6–4.47)
LYMPHOCYTES NFR BLD AUTO: 17 % (ref 14–44)
MCH RBC QN AUTO: 28.5 PG (ref 26.8–34.3)
MCHC RBC AUTO-ENTMCNC: 32.7 G/DL (ref 31.4–37.4)
MCV RBC AUTO: 87 FL (ref 82–98)
MONOCYTES # BLD AUTO: 0.68 THOUSAND/ÂΜL (ref 0.17–1.22)
MONOCYTES NFR BLD AUTO: 5 % (ref 4–12)
NEUTROPHILS # BLD AUTO: 9.84 THOUSANDS/ÂΜL (ref 1.85–7.62)
NEUTS SEG NFR BLD AUTO: 76 % (ref 43–75)
NRBC BLD AUTO-RTO: 0 /100 WBCS
PLATELET # BLD AUTO: 242 THOUSANDS/UL (ref 149–390)
PMV BLD AUTO: 11 FL (ref 8.9–12.7)
POTASSIUM SERPL-SCNC: 3.7 MMOL/L (ref 3.5–5.3)
PROT SERPL-MCNC: 7.7 G/DL (ref 6.4–8.4)
RBC # BLD AUTO: 5.86 MILLION/UL (ref 3.88–5.62)
SODIUM SERPL-SCNC: 139 MMOL/L (ref 135–147)
WBC # BLD AUTO: 13.03 THOUSAND/UL (ref 4.31–10.16)

## 2024-04-21 PROCEDURE — 96360 HYDRATION IV INFUSION INIT: CPT

## 2024-04-21 PROCEDURE — 99284 EMERGENCY DEPT VISIT MOD MDM: CPT | Performed by: EMERGENCY MEDICINE

## 2024-04-21 PROCEDURE — 80053 COMPREHEN METABOLIC PANEL: CPT | Performed by: EMERGENCY MEDICINE

## 2024-04-21 PROCEDURE — 74177 CT ABD & PELVIS W/CONTRAST: CPT

## 2024-04-21 PROCEDURE — 36415 COLL VENOUS BLD VENIPUNCTURE: CPT | Performed by: EMERGENCY MEDICINE

## 2024-04-21 PROCEDURE — 99284 EMERGENCY DEPT VISIT MOD MDM: CPT

## 2024-04-21 PROCEDURE — 85025 COMPLETE CBC W/AUTO DIFF WBC: CPT | Performed by: EMERGENCY MEDICINE

## 2024-04-21 RX ORDER — ACETAMINOPHEN 325 MG/1
650 TABLET ORAL ONCE
Status: COMPLETED | OUTPATIENT
Start: 2024-04-21 | End: 2024-04-21

## 2024-04-21 RX ORDER — GINSENG 100 MG
1 CAPSULE ORAL ONCE
Status: COMPLETED | OUTPATIENT
Start: 2024-04-21 | End: 2024-04-21

## 2024-04-21 RX ADMIN — ACETAMINOPHEN 650 MG: 325 TABLET ORAL at 16:00

## 2024-04-21 RX ADMIN — SODIUM CHLORIDE 500 ML: 0.9 INJECTION, SOLUTION INTRAVENOUS at 13:36

## 2024-04-21 RX ADMIN — BACITRACIN 1 SMALL APPLICATION: 500 OINTMENT TOPICAL at 16:42

## 2024-04-21 RX ADMIN — IOHEXOL 100 ML: 350 INJECTION, SOLUTION INTRAVENOUS at 15:13

## 2024-04-21 NOTE — ED PROVIDER NOTES
History  Chief Complaint   Patient presents with    Wound Check     States he fell in a handicap shower at a hotel in [place] on 4/18 and then later noted blood on sheet in bed. Swelling left flank area with bruising and linear wound.      48-year-old male states that he was in a hotel in [place] on the 18th for approximately 2 days..  Patient states that he had been drinking during that time and he woke up and he found some blood on the bed and realized it came from his left flank did have a wound there and now is complaining of a big area of bruising and pain to that area there is a central area unsure if this was where the patient fell and struck something or if it was a knife wound to that area.  Patient is awake and alert no other complaints states that he spent 2 nights in a hotel because he has not been getting along with his wife.        Prior to Admission Medications   Prescriptions Last Dose Informant Patient Reported? Taking?   ALPRAZolam (XANAX) 1 mg tablet   Yes No   Sig: TAKE 1 TABLET BY MOUTH THREE TIMES A DAY AS NEEDED   DULoxetine (CYMBALTA) 60 mg delayed release capsule   Yes No   Sig: Take 60 mg by mouth daily   Patient not taking: Reported on 10/11/2023   albuterol (PROVENTIL HFA,VENTOLIN HFA) 90 mcg/act inhaler   Yes No   Sig: INL 2 PFS PO Q 4 H PRF WHZ   atorvastatin (LIPITOR) 40 mg tablet   Yes No   Sig: Take 40 mg by mouth daily   Patient not taking: Reported on 10/11/2023   bisacodyl (DULCOLAX) 10 mg suppository   No No   Sig: Insert 1 suppository (10 mg total) into the rectum daily   Patient not taking: Reported on 10/11/2023   cyclobenzaprine (FLEXERIL) 10 mg tablet   No No   Sig: Take 1 tablet (10 mg total) by mouth 3 (three) times a day as needed for muscle spasms for up to 4 days   fluticasone-umeclidinium-vilanterol 100-62.5-25 mcg/actuation inhaler   Yes No   Sig: Inhale   Patient not taking: Reported on 10/11/2023   ibuprofen (MOTRIN) 200 mg tablet   Yes No   Sig: Take by mouth  every 6 (six) hours as needed for mild pain   Patient not taking: Reported on 2/8/2024   magnesium citrate (CITROMA) 1.745 g/30 mL oral solution   No No   Sig: Take 296 mL by mouth once for 1 dose   meloxicam (Mobic) 15 mg tablet   No No   Sig: Take 1 tablet (15 mg total) by mouth daily   naproxen (NAPROSYN) 500 mg tablet   No No   Sig: Take 1 tablet (500 mg total) by mouth 2 (two) times a day with meals for 7 days      Facility-Administered Medications Last Administration Doses Remaining   dexamethasone (DECADRON) injection 4 mg None recorded 1   triamcinolone acetonide (KENALOG-40) 40 mg/mL injection 40 mg None recorded 1   triamcinolone acetonide (KENALOG-40) 40 mg/mL injection 40 mg 2/15/2024  4:00 PM           Past Medical History:   Diagnosis Date    Allergic rhinitis due to allergen 10/01/2018    Anxiety     Chronic pain     COPD (chronic obstructive pulmonary disease) (HCC)     Depression 10/17/2016    Diarrhea in adult patient     Hyperlipidemia        Past Surgical History:   Procedure Laterality Date    EGD AND COLONOSCOPY      ELBOW ARTHROSCOPY      ELBOW SURGERY      x2    IR CHOLECYSTOSTOMY TUBE CHECK/CHANGE/REPOSITION/REINSERTION/UPSIZE  09/11/2023    IR CHOLECYSTOSTOMY TUBE PLACEMENT  07/28/2023    PA LAPAROSCOPY SURG CHOLECYSTECTOMY N/A 9/26/2023    Procedure: CHOLECYSTECTOMY LAPAROSCOPIC;  Surgeon: Nolberto Cooper MD;  [facility];  Service: General    WRIST SURGERY         History reviewed. No pertinent family history.  I have reviewed and agree with the history as documented.    E-Cigarette/Vaping    E-Cigarette Use Never User      E-Cigarette/Vaping Substances    Nicotine No     THC No     CBD No     Flavoring No     Other No     Unknown No      Social History     Tobacco Use    Smoking status: Every Day     Current packs/day: 0.50     Average packs/day: 0.5 packs/day for 30.0 years (15.0 ttl pk-yrs)     Types: Cigarettes     Passive exposure: Never    Smokeless tobacco: Never   Vaping  Use    Vaping status: Never Used   Substance Use Topics    Alcohol use: Not Currently    Drug use: Yes     Types: Marijuana     Comment: Is in pain mangement,  to get off pain meds.       Review of Systems   Constitutional:  Negative for activity change, chills, diaphoresis and fever.   HENT:  Negative for congestion, ear pain, nosebleeds, sore throat, trouble swallowing and voice change.    Eyes:  Negative for pain, discharge and redness.   Respiratory:  Negative for apnea, cough, choking, shortness of breath, wheezing and stridor.    Cardiovascular:  Negative for chest pain and palpitations.   Gastrointestinal:  Negative for abdominal distention, abdominal pain, constipation, diarrhea, nausea and vomiting.   Endocrine: Negative for polydipsia.   Genitourinary:  Negative for difficulty urinating, dysuria, flank pain, frequency, hematuria and urgency.   Musculoskeletal:  Positive for back pain. Negative for gait problem, joint swelling, myalgias, neck pain and neck stiffness.   Skin:  Negative for pallor and rash.   Neurological:  Negative for dizziness, tremors, syncope, speech difficulty, weakness, numbness and headaches.   Hematological:  Negative for adenopathy.   Psychiatric/Behavioral:  Negative for confusion, hallucinations, self-injury and suicidal ideas. The patient is not nervous/anxious.        Physical Exam  Physical Exam  Vitals and nursing note reviewed.   Constitutional:       General: He is not in acute distress.     Appearance: Normal appearance. He is well-developed. He is not diaphoretic.       HENT:      Head: Normocephalic and atraumatic.      Right Ear: External ear normal.      Left Ear: External ear normal.      Nose: Nose normal.   Eyes:      Conjunctiva/sclera: Conjunctivae normal.      Pupils: Pupils are equal, round, and reactive to light.   Cardiovascular:      Rate and Rhythm: Normal rate and regular rhythm.      Heart sounds: Normal heart sounds.   Pulmonary:      Effort: Pulmonary  effort is normal.      Breath sounds: Normal breath sounds.   Abdominal:      General: Bowel sounds are normal.      Palpations: Abdomen is soft.      Tenderness: There is abdominal tenderness.      Comments: Diffuse tenderness   Musculoskeletal:         General: Normal range of motion.      Cervical back: Normal range of motion and neck supple.   Skin:     General: Skin is warm and dry.   Neurological:      Mental Status: He is alert and oriented to person, place, and time.      Deep Tendon Reflexes: Reflexes are normal and symmetric.         Vital Signs  ED Triage Vitals   Temperature Pulse Respirations Blood Pressure SpO2   04/21/24 1318 04/21/24 1318 04/21/24 1318 04/21/24 1318 04/21/24 1318   (!) 96.6 °F (35.9 °C) 84 18 138/82 98 %      Temp Source Heart Rate Source Patient Position - Orthostatic VS BP Location FiO2 (%)   04/21/24 1318 04/21/24 1318 04/21/24 1318 04/21/24 1318 --   Tympanic Monitor Sitting Left arm       Pain Score       04/21/24 1600       10 - Worst Possible Pain           Vitals:    04/21/24 1318 04/21/24 1644   BP: 138/82 130/60   Pulse: 84 73   Patient Position - Orthostatic VS: Sitting Sitting         Visual Acuity      ED Medications  Medications   sodium chloride 0.9 % bolus 500 mL (0 mL Intravenous Stopped 4/21/24 1436)   iohexol (OMNIPAQUE) 350 MG/ML injection (MULTI-DOSE) 100 mL (100 mL Intravenous Given 4/21/24 1513)   acetaminophen (TYLENOL) tablet 650 mg (650 mg Oral Given 4/21/24 1600)   bacitracin topical ointment 1 small application (1 small application Topical Given 4/21/24 1642)       Diagnostic Studies  Results Reviewed       Procedure Component Value Units Date/Time    Comprehensive metabolic panel [[id number]]  (Abnormal) Collected: 04/21/24 1338    Lab Status: Final result Specimen: Blood from Arm, Right Updated: 04/21/24 1359     Sodium 139 mmol/L      Potassium 3.7 mmol/L      Chloride 99 mmol/L      CO2 32 mmol/L      ANION GAP 8 mmol/L      BUN 17 mg/dL      Creatinine  0.99 mg/dL      Glucose 81 mg/dL      Calcium 9.8 mg/dL      AST 17 U/L      ALT 14 U/L      Alkaline Phosphatase 118 U/L      Total Protein 7.7 g/dL      Albumin 4.4 g/dL      Total Bilirubin 0.58 mg/dL      eGFR 90 ml/min/1.73sq m     Narrative:      [employer] guidelines for Chronic Kidney Disease (CKD):     Stage 1 with normal or high GFR (GFR > 90 mL/min/1.73 square meters)    Stage 2 Mild CKD (GFR = 60-89 mL/min/1.73 square meters)    Stage 3A Moderate CKD (GFR = 45-59 mL/min/1.73 square meters)    Stage 3B Moderate CKD (GFR = 30-44 mL/min/1.73 square meters)    Stage 4 Severe CKD (GFR = 15-29 mL/min/1.73 square meters)    Stage 5 End Stage CKD (GFR <15 mL/min/1.73 square meters)  Note: GFR calculation is accurate only with a steady state creatinine    CBC and differential [[id number]]  (Abnormal) Collected: 04/21/24 1338    Lab Status: Final result Specimen: Blood from Arm, Right Updated: 04/21/24 1343     WBC 13.03 Thousand/uL      RBC 5.86 Million/uL      Hemoglobin 16.7 g/dL      Hematocrit 51.1 %      MCV 87 fL      MCH 28.5 pg      MCHC 32.7 g/dL      RDW 13.1 %      MPV 11.0 fL      Platelets 242 Thousands/uL      nRBC 0 /100 WBCs      Segmented % 76 %      Immature Grans % 0 %      Lymphocytes % 17 %      Monocytes % 5 %      Eosinophils Relative 1 %      Basophils Relative 1 %      Absolute Neutrophils 9.84 Thousands/µL      Absolute Immature Grans 0.04 Thousand/uL      Absolute Lymphocytes 2.26 Thousands/µL      Absolute Monocytes 0.68 Thousand/µL      Eosinophils Absolute 0.09 Thousand/µL      Basophils Absolute 0.12 Thousands/µL                    CT abdomen pelvis with contrast   Final Result by Torrey Szymanski MD (04/21 1622)      Minimal subcutaneous stranding of the left flank in keeping with provided history of bruise. Otherwise no acute findings in the abdomen or pelvis.         Workstation performed: [id number]                    Procedures  Procedures          ED Course                               SBIRT 20yo+      Flowsheet Row Most Recent Value   Initial Alcohol Screen: US AUDIT-C     1. How often do you have a drink containing alcohol? --  [not usually] Filed at: 04/21/2024 1320   STEVENSON: How many times in the past year have you...    Used an illegal drug or used a prescription medication for non-medical reasons? Never Filed at: 04/21/2024 1320                      Medical Decision Making  Amount and/or Complexity of Data Reviewed  Labs: ordered.  Radiology: ordered.    Risk  OTC drugs.  Prescription drug management.             Disposition  Final diagnoses:   Visit for wound check     Time reflects when diagnosis was documented in both MDM as applicable and the Disposition within this note       Time User Action Codes Description Comment    4/21/2024  4:34 PM Pasquale Eckert Add [Z51.89] Visit for wound check           ED Disposition       ED Disposition   Discharge    Condition   Stable    Date/Time   Sun Apr 21, 2024 1634    Comment   Humberto De Guzman discharge to home/self care.                   Follow-up Information       Follow up With Specialties Details Why Contact Info    Naldo Berger MD Internal Medicine Schedule an appointment as soon as possible for a visit  As needed [address] [place] [id number]  [phone]              Discharge Medication List as of 4/21/2024  4:36 PM        CONTINUE these medications which have NOT CHANGED    Details   albuterol (PROVENTIL HFA,VENTOLIN HFA) 90 mcg/act inhaler INL 2 PFS PO Q 4 H PRF WHZ, Historical Med      ALPRAZolam (XANAX) 1 mg tablet TAKE 1 TABLET BY MOUTH THREE TIMES A DAY AS NEEDED, Historical Med      atorvastatin (LIPITOR) 40 mg tablet Take 40 mg by mouth daily, Starting Fri 8/4/2023, Historical Med      bisacodyl (DULCOLAX) 10 mg suppository Insert 1 suppository (10 mg total) into the rectum daily, Starting Fri 9/29/2023, Normal      cyclobenzaprine (FLEXERIL) 10 mg tablet Take 1 tablet (10 mg  total) by mouth 3 (three) times a day as needed for muscle spasms for up to 4 days, Starting Thu 1/25/2024, Until Mon 1/29/2024 at 2359, Normal      DULoxetine (CYMBALTA) 60 mg delayed release capsule Take 60 mg by mouth daily, Historical Med      fluticasone-umeclidinium-vilanterol 100-62.5-25 mcg/actuation inhaler Inhale, Historical Med      ibuprofen (MOTRIN) 200 mg tablet Take by mouth every 6 (six) hours as needed for mild pain, Historical Med      magnesium citrate (CITROMA) 1.745 g/30 mL oral solution Take 296 mL by mouth once for 1 dose, Starting Fri 9/29/2023, Normal      meloxicam (Mobic) 15 mg tablet Take 1 tablet (15 mg total) by mouth daily, Starting Thu 2/8/2024, Normal      naproxen (NAPROSYN) 500 mg tablet Take 1 tablet (500 mg total) by mouth 2 (two) times a day with meals for 7 days, Starting Thu 1/25/2024, Until Thu 2/1/2024, Normal             No discharge procedures on file.    PDMP Review       None            ED Provider  Electronically Signed by             Pasquale Eckert DO  04/21/24 5804

## 2024-05-23 ENCOUNTER — HOSPITAL ENCOUNTER (EMERGENCY)
Facility: HOSPITAL | Age: 49
Discharge: HOME | End: 2024-05-23
Attending: EMERGENCY MEDICINE
Payer: COMMERCIAL

## 2024-05-23 VITALS
OXYGEN SATURATION: 98 % | BODY MASS INDEX: 25.07 KG/M2 | RESPIRATION RATE: 20 BRPM | WEIGHT: 190 LBS | TEMPERATURE: 98 F | SYSTOLIC BLOOD PRESSURE: 140 MMHG | DIASTOLIC BLOOD PRESSURE: 81 MMHG | HEART RATE: 76 BPM

## 2024-05-23 DIAGNOSIS — R21 RASH AND NONSPECIFIC SKIN ERUPTION: Primary | ICD-10-CM

## 2024-05-23 LAB
ALBUMIN SERPL BCP-MCNC: 4.3 G/DL (ref 3.5–5)
ALP SERPL-CCNC: 95 U/L (ref 34–104)
ALT SERPL W P-5'-P-CCNC: 12 U/L (ref 7–52)
ANION GAP SERPL CALCULATED.3IONS-SCNC: 6 MMOL/L (ref 4–13)
APTT PPP: 28 SECONDS (ref 23–37)
AST SERPL W P-5'-P-CCNC: 16 U/L (ref 13–39)
BASOPHILS # BLD AUTO: 0.08 THOUSANDS/ÂΜL (ref 0–0.1)
BASOPHILS NFR BLD AUTO: 1 % (ref 0–1)
BILIRUB SERPL-MCNC: 0.35 MG/DL (ref 0.2–1)
BUN SERPL-MCNC: 9 MG/DL (ref 5–25)
CALCIUM SERPL-MCNC: 9.2 MG/DL (ref 8.4–10.2)
CHLORIDE SERPL-SCNC: 105 MMOL/L (ref 96–108)
CO2 SERPL-SCNC: 26 MMOL/L (ref 21–32)
CREAT SERPL-MCNC: 0.83 MG/DL (ref 0.6–1.3)
EOSINOPHIL # BLD AUTO: 0.21 THOUSAND/ÂΜL (ref 0–0.61)
EOSINOPHIL NFR BLD AUTO: 2 % (ref 0–6)
ERYTHROCYTE [DISTWIDTH] IN BLOOD BY AUTOMATED COUNT: 13 % (ref 11.6–15.1)
GFR SERPL CREATININE-BSD FRML MDRD: 105 ML/MIN/1.73SQ M
GLUCOSE SERPL-MCNC: 90 MG/DL (ref 65–140)
HCT VFR BLD AUTO: 48 % (ref 36.5–49.3)
HGB BLD-MCNC: 16 G/DL (ref 12–17)
IMM GRANULOCYTES # BLD AUTO: 0.04 THOUSAND/UL (ref 0–0.2)
IMM GRANULOCYTES NFR BLD AUTO: 0 % (ref 0–2)
INR PPP: 0.91 (ref 0.84–1.19)
LYMPHOCYTES # BLD AUTO: 2.1 THOUSANDS/ÂΜL (ref 0.6–4.47)
LYMPHOCYTES NFR BLD AUTO: 19 % (ref 14–44)
MCH RBC QN AUTO: 28.4 PG (ref 26.8–34.3)
MCHC RBC AUTO-ENTMCNC: 33.3 G/DL (ref 31.4–37.4)
MCV RBC AUTO: 85 FL (ref 82–98)
MONOCYTES # BLD AUTO: 0.5 THOUSAND/ÂΜL (ref 0.17–1.22)
MONOCYTES NFR BLD AUTO: 5 % (ref 4–12)
NEUTROPHILS # BLD AUTO: 8.1 THOUSANDS/ÂΜL (ref 1.85–7.62)
NEUTS SEG NFR BLD AUTO: 73 % (ref 43–75)
NRBC BLD AUTO-RTO: 0 /100 WBCS
PLATELET # BLD AUTO: 205 THOUSANDS/UL (ref 149–390)
PMV BLD AUTO: 10.8 FL (ref 8.9–12.7)
POTASSIUM SERPL-SCNC: 3.8 MMOL/L (ref 3.5–5.3)
PROT SERPL-MCNC: 7 G/DL (ref 6.4–8.4)
PROTHROMBIN TIME: 12.5 SECONDS (ref 11.6–14.5)
RBC # BLD AUTO: 5.63 MILLION/UL (ref 3.88–5.62)
SODIUM SERPL-SCNC: 137 MMOL/L (ref 135–147)
WBC # BLD AUTO: 11.03 THOUSAND/UL (ref 4.31–10.16)

## 2024-05-23 PROCEDURE — 80053 COMPREHEN METABOLIC PANEL: CPT

## 2024-05-23 PROCEDURE — 36415 COLL VENOUS BLD VENIPUNCTURE: CPT

## 2024-05-23 PROCEDURE — 96374 THER/PROPH/DIAG INJ IV PUSH: CPT

## 2024-05-23 PROCEDURE — 85025 COMPLETE CBC W/AUTO DIFF WBC: CPT

## 2024-05-23 PROCEDURE — 85730 THROMBOPLASTIN TIME PARTIAL: CPT

## 2024-05-23 PROCEDURE — 85610 PROTHROMBIN TIME: CPT

## 2024-05-23 PROCEDURE — 99283 EMERGENCY DEPT VISIT LOW MDM: CPT

## 2024-05-23 PROCEDURE — 96375 TX/PRO/DX INJ NEW DRUG ADDON: CPT

## 2024-05-23 PROCEDURE — 99284 EMERGENCY DEPT VISIT MOD MDM: CPT

## 2024-05-23 RX ORDER — METOCLOPRAMIDE HYDROCHLORIDE 5 MG/ML
10 INJECTION INTRAMUSCULAR; INTRAVENOUS ONCE
Status: COMPLETED | OUTPATIENT
Start: 2024-05-23 | End: 2024-05-23

## 2024-05-23 RX ORDER — IBUPROFEN 600 MG/1
600 TABLET ORAL ONCE
Status: DISCONTINUED | OUTPATIENT
Start: 2024-05-23 | End: 2024-05-23

## 2024-05-23 RX ORDER — DIPHENHYDRAMINE HCL 25 MG
25 TABLET ORAL EVERY 6 HOURS
Qty: 20 TABLET | Refills: 0 | Status: SHIPPED | OUTPATIENT
Start: 2024-05-23

## 2024-05-23 RX ORDER — DIPHENHYDRAMINE HYDROCHLORIDE 50 MG/ML
25 INJECTION INTRAMUSCULAR; INTRAVENOUS ONCE
Status: COMPLETED | OUTPATIENT
Start: 2024-05-23 | End: 2024-05-23

## 2024-05-23 RX ORDER — KETOROLAC TROMETHAMINE 30 MG/ML
15 INJECTION, SOLUTION INTRAMUSCULAR; INTRAVENOUS ONCE
Status: COMPLETED | OUTPATIENT
Start: 2024-05-23 | End: 2024-05-23

## 2024-05-23 RX ADMIN — METOCLOPRAMIDE 10 MG: 5 INJECTION, SOLUTION INTRAMUSCULAR; INTRAVENOUS at 12:26

## 2024-05-23 RX ADMIN — KETOROLAC TROMETHAMINE 15 MG: 30 INJECTION, SOLUTION INTRAMUSCULAR; INTRAVENOUS at 12:20

## 2024-05-23 RX ADMIN — DIPHENHYDRAMINE HYDROCHLORIDE 25 MG: 50 INJECTION, SOLUTION INTRAMUSCULAR; INTRAVENOUS at 12:23

## 2024-05-23 NOTE — ED PROVIDER NOTES
"History  Chief Complaint   Patient presents with    Rash     Pt states he was put on colchicine for \" endo carditis\". Has a rash today.      48-year-old male presenting for an evaluation of a pruritic, non-painful rash on his chest and upper extremities that he noticed this morning. Patient reports he was prescribed anti-inflammatory therapy by his cardiologist, ibuprofen 800 3 times daily and colchicine 0.6 p.o. daily, for management of his ongoing pain syndrome, (precordial pain, costochondritis, per [facility] Cardiology note 04/09/2024). However, patient states he only took colchicine for several days before discontinuing it due to GI upset. He states he later followed up with \"another cardiologist who prescribed him colchicine\" which he has been taking for the past 1.5 weeks, and reports he had his dose increased 4 days ago (unable to find note on chart review/care everywhere of this most recent visit). No fever, chills, shortness of breath, swelling, sore throat, sores on mucosal membranes, vomiting, exposure to allergens or irritants. No history of prior similar rashes.       Rash  Associated symptoms: headaches    Associated symptoms: no abdominal pain, no diarrhea, no fever, no joint pain, no nausea, no shortness of breath, no sore throat, not vomiting and not wheezing        Prior to Admission Medications   Prescriptions Last Dose Informant Patient Reported? Taking?   ALPRAZolam (XANAX) 1 mg tablet   Yes No   Sig: TAKE 1 TABLET BY MOUTH THREE TIMES A DAY AS NEEDED   DULoxetine (CYMBALTA) 60 mg delayed release capsule   Yes No   Sig: Take 60 mg by mouth daily   Patient not taking: Reported on 10/11/2023   albuterol (PROVENTIL HFA,VENTOLIN HFA) 90 mcg/act inhaler   Yes No   Sig: INL 2 PFS PO Q 4 H PRF WHZ   atorvastatin (LIPITOR) 40 mg tablet   Yes No   Sig: Take 40 mg by mouth daily   Patient not taking: Reported on 10/11/2023   bisacodyl (DULCOLAX) 10 mg suppository   No No   Sig: Insert 1 suppository " (10 mg total) into the rectum daily   Patient not taking: Reported on 10/11/2023   cyclobenzaprine (FLEXERIL) 10 mg tablet   No No   Sig: Take 1 tablet (10 mg total) by mouth 3 (three) times a day as needed for muscle spasms for up to 4 days   fluticasone-umeclidinium-vilanterol 100-62.5-25 mcg/actuation inhaler   Yes No   Sig: Inhale   Patient not taking: Reported on 10/11/2023   ibuprofen (MOTRIN) 200 mg tablet   Yes No   Sig: Take by mouth every 6 (six) hours as needed for mild pain   Patient not taking: Reported on 2/8/2024   magnesium citrate (CITROMA) 1.745 g/30 mL oral solution   No No   Sig: Take 296 mL by mouth once for 1 dose   meloxicam (Mobic) 15 mg tablet   No No   Sig: Take 1 tablet (15 mg total) by mouth daily   naproxen (NAPROSYN) 500 mg tablet   No No   Sig: Take 1 tablet (500 mg total) by mouth 2 (two) times a day with meals for 7 days      Facility-Administered Medications Last Administration Doses Remaining   dexamethasone (DECADRON) injection 4 mg None recorded 1   triamcinolone acetonide (KENALOG-40) 40 mg/mL injection 40 mg None recorded 1   triamcinolone acetonide (KENALOG-40) 40 mg/mL injection 40 mg 2/15/2024  4:00 PM           Past Medical History:   Diagnosis Date    Allergic rhinitis due to allergen 10/01/2018    Anxiety     Chronic pain     COPD (chronic obstructive pulmonary disease) (HCC)     Depression 10/17/2016    Diarrhea in adult patient     Hyperlipidemia        Past Surgical History:   Procedure Laterality Date    EGD AND COLONOSCOPY      ELBOW ARTHROSCOPY      ELBOW SURGERY      x2    IR CHOLECYSTOSTOMY TUBE CHECK/CHANGE/REPOSITION/REINSERTION/UPSIZE  09/11/2023    IR CHOLECYSTOSTOMY TUBE PLACEMENT  07/28/2023    PA LAPAROSCOPY SURG CHOLECYSTECTOMY N/A 9/26/2023    Procedure: CHOLECYSTECTOMY LAPAROSCOPIC;  Surgeon: Nolberto Cooper MD;  Location: [facility];  Service: General    WRIST SURGERY         History reviewed. No pertinent family history.  I have reviewed and agree with  the history as documented.    E-Cigarette/Vaping    E-Cigarette Use Never User      E-Cigarette/Vaping Substances    Nicotine No     THC No     CBD No     Flavoring No     Other No     Unknown No      Social History     Tobacco Use    Smoking status: Every Day     Current packs/day: 0.50     Average packs/day: 0.5 packs/day for 30.0 years (15.0 ttl pk-yrs)     Types: Cigarettes     Passive exposure: Never    Smokeless tobacco: Never   Vaping Use    Vaping status: Never Used   Substance Use Topics    Alcohol use: Not Currently    Drug use: Yes     Types: Marijuana     Comment: Is in pain mangement,  to get off pain meds.       Review of Systems   Constitutional:  Negative for chills and fever.   HENT:  Negative for facial swelling, mouth sores, sore throat and trouble swallowing.    Eyes:  Negative for pain, redness and visual disturbance.   Respiratory:  Negative for cough, shortness of breath and wheezing.    Cardiovascular:  Positive for chest pain (chronic, precrodial pain). Negative for palpitations.   Gastrointestinal:  Negative for abdominal pain, constipation, diarrhea, nausea and vomiting.   Genitourinary:  Negative for hematuria.   Musculoskeletal:  Negative for arthralgias, joint swelling and neck stiffness.   Skin:  Positive for rash. Negative for color change, pallor and wound.   Neurological:  Positive for headaches. Negative for dizziness, seizures and syncope.   Hematological:  Does not bruise/bleed easily.   All other systems reviewed and are negative.      Physical Exam  Physical Exam  Vitals and nursing note reviewed.   Constitutional:       General: He is not in acute distress.     Appearance: Normal appearance. He is well-developed. He is not ill-appearing or toxic-appearing.   HENT:      Head: Normocephalic and atraumatic.      Nose: Nose normal.      Mouth/Throat:      Mouth: Mucous membranes are moist.      Pharynx: Oropharynx is clear. No oropharyngeal exudate or posterior oropharyngeal  erythema.   Eyes:      Extraocular Movements: Extraocular movements intact.      Conjunctiva/sclera: Conjunctivae normal.      Pupils: Pupils are equal, round, and reactive to light.   Cardiovascular:      Rate and Rhythm: Normal rate and regular rhythm.   Pulmonary:      Effort: Pulmonary effort is normal. No respiratory distress.      Breath sounds: Normal breath sounds. No wheezing or rales.   Abdominal:      General: Abdomen is flat. There is no distension.      Palpations: Abdomen is soft.      Tenderness: There is no abdominal tenderness.   Musculoskeletal:         General: No swelling. Normal range of motion.      Cervical back: Normal range of motion and neck supple.      Right lower leg: No edema.      Left lower leg: No edema.   Skin:     General: Skin is warm and dry.      Capillary Refill: Capillary refill takes less than 2 seconds.      Findings: Rash present. Rash is macular, papular and purpuric. Rash is not crusting, pustular or vesicular.      Comments: Inspection reveals a non-blanching maculopapular rash of the upper chest and proximal extremities, characterized by a diffuse pink to tan hue, with a somewhat linear arrangement on the chest, more irregular and scattered on the upper extremities. Spares the face, palms and soles. It is not spreading rapidly to other areas of the body. The rash is not raised, crusted or ulcerated. The surrounding skin appears normal in color and texture without signs of erythema beyond the rash borders.  There is no purpura, petechiae, vesicles, bullae. No involvement of the mucosal membranes or the conjunctivam B/L.    Neurological:      Mental Status: He is alert.   Psychiatric:         Mood and Affect: Mood normal.               Vital Signs  ED Triage Vitals [05/23/24 1124]   Temperature Pulse Respirations Blood Pressure SpO2   98 °F (36.7 °C) 76 20 140/81 98 %      Temp src Heart Rate Source Patient Position - Orthostatic VS BP Location FiO2 (%)   -- -- -- -- --       Pain Score       No Pain           Vitals:    05/23/24 1124   BP: 140/81   Pulse: 76         Visual Acuity      ED Medications  Medications   metoclopramide (REGLAN) injection 10 mg (10 mg Intravenous Given 5/23/24 1226)   diphenhydrAMINE (BENADRYL) injection 25 mg (25 mg Intravenous Given 5/23/24 1223)   ketorolac (TORADOL) injection 15 mg (15 mg Intravenous Given 5/23/24 1220)       Diagnostic Studies  Results Reviewed       Procedure Component Value Units Date/Time    Comprehensive metabolic panel [[id number]] Collected: 05/23/24 1208    Lab Status: Final result Specimen: Blood from Arm, Left Updated: 05/23/24 1237     Sodium 137 mmol/L      Potassium 3.8 mmol/L      Chloride 105 mmol/L      CO2 26 mmol/L      ANION GAP 6 mmol/L      BUN 9 mg/dL      Creatinine 0.83 mg/dL      Glucose 90 mg/dL      Calcium 9.2 mg/dL      AST 16 U/L      ALT 12 U/L      Alkaline Phosphatase 95 U/L      Total Protein 7.0 g/dL      Albumin 4.3 g/dL      Total Bilirubin 0.35 mg/dL      eGFR 105 ml/min/1.73sq m     Narrative:      National Kidney Disease Foundation guidelines for Chronic Kidney Disease (CKD):     Stage 1 with normal or high GFR (GFR > 90 mL/min/1.73 square meters)    Stage 2 Mild CKD (GFR = 60-89 mL/min/1.73 square meters)    Stage 3A Moderate CKD (GFR = 45-59 mL/min/1.73 square meters)    Stage 3B Moderate CKD (GFR = 30-44 mL/min/1.73 square meters)    Stage 4 Severe CKD (GFR = 15-29 mL/min/1.73 square meters)    Stage 5 End Stage CKD (GFR <15 mL/min/1.73 square meters)  Note: GFR calculation is accurate only with a steady state creatinine    Protime-INR [[id number]]  (Normal) Collected: 05/23/24 1208    Lab Status: Final result Specimen: Blood from Arm, Left Updated: 05/23/24 1230     Protime 12.5 seconds      INR 0.91    APTT [[id number]]  (Normal) Collected: 05/23/24 1208    Lab Status: Final result Specimen: Blood from Arm, Left Updated: 05/23/24 1230     PTT 28 seconds     CBC and differential [[id number]]   (Abnormal) Collected: 05/23/24 1208    Lab Status: Final result Specimen: Blood from Arm, Left Updated: 05/23/24 1215     WBC 11.03 Thousand/uL      RBC 5.63 Million/uL      Hemoglobin 16.0 g/dL      Hematocrit 48.0 %      MCV 85 fL      MCH 28.4 pg      MCHC 33.3 g/dL      RDW 13.0 %      MPV 10.8 fL      Platelets 205 Thousands/uL      nRBC 0 /100 WBCs      Segmented % 73 %      Immature Grans % 0 %      Lymphocytes % 19 %      Monocytes % 5 %      Eosinophils Relative 2 %      Basophils Relative 1 %      Absolute Neutrophils 8.10 Thousands/µL      Absolute Immature Grans 0.04 Thousand/uL      Absolute Lymphocytes 2.10 Thousands/µL      Absolute Monocytes 0.50 Thousand/µL      Eosinophils Absolute 0.21 Thousand/µL      Basophils Absolute 0.08 Thousands/µL                    No orders to display              Procedures  Procedures         ED Course  ED Course as of 05/23/24 1449   Thu May 23, 2024   1157 Dr. Ordoñez evaluating patient at bedside                               SBIRT 20yo+      Flowsheet Row Most Recent Value   Initial Alcohol Screen: US AUDIT-C     1. How often do you have a drink containing alcohol? 0 Filed at: 05/23/2024 1125   2. How many drinks containing alcohol do you have on a typical day you are drinking?  0 Filed at: 05/23/2024 1125   3a. Male UNDER 65: How often do you have five or more drinks on one occasion? 0 Filed at: 05/23/2024 1125   3b. FEMALE Any Age, or MALE 65+: How often do you have 4 or more drinks on one occassion? 0 Filed at: 05/23/2024 1125   Audit-C Score 0 Filed at: 05/23/2024 1125                      Medical Decision Making  Non-toxic appearing 48-year-old male with a new rash. Will get basic labs as a precaution. Patient also states he has a migraine, will treat as well. Differential diagnosis for rash includes hypersensitivity reaction in the setting of dosage increase of colchicine, heat rash, contact dermatitis. Considered other skin reactions associated with  colchicine use such as erythema multiforme, SJS/TEN however unlikely due to lack of target like lesions on the skin, no fever, no mucous membrane involvement, no palm/sole involvement, sloughing, necrosis. Also considered DRESS however no eosinophilia or kidney involvement on labs. Patient case discussed with Dr. Ordoñez, who saw patient at bedside and is agreeable to patient disposition. Patient reports relief of pruritus after benadryl. Patient told to discontinue colchicine, and follow up with PCP, cardiology and dermatology. All questions answered. Return precautions provided with full understanding and includes, but is not limited to, worsening rash, pain, swelling, discharge, mucosal involvement, and any other new or concerning findings. Patient is not in acute distress, has no other complaints and has stable VS at time of discharge.    Amount and/or Complexity of Data Reviewed  Labs: ordered.    Risk  OTC drugs.  Prescription drug management.             Disposition  Final diagnoses:   Rash and nonspecific skin eruption     Time reflects when diagnosis was documented in both MDM as applicable and the Disposition within this note       Time User Action Codes Description Comment    5/23/2024  1:10 PM Allyssa Spencer Add [R21] Rash and nonspecific skin eruption           ED Disposition       ED Disposition   Discharge    Condition   Stable    Date/Time   Thu May 23, 2024 1310    Comment   Humberto De Guzman discharge to home/self care.                   Follow-up Information       Follow up With Specialties Details Why Contact Info Additional Information    Naldo Berger MD Internal Medicine Schedule an appointment as soon as possible for a visit in 1 day As needed, If symptoms worsen [address] [place] [id number]  [phone]       [facility] Emergency Medicine Go to  As needed, If symptoms worsen, pain, fever, chills, trouble breathing, swollen throat or extremities,  blistering or new changes to your rash, or any new/concerning symptoms [address] [facility] [id number] [phone] [facility], [address] [place], [place], [address]    [facility]   [address] [facility] [address]  [facility] [id number] [phone] [facility], [address] [facility], [place] [address], [place], [place], [id number] [phone]            Discharge Medication List as of 5/23/2024  1:17 PM        START taking these medications    Details   diphenhydrAMINE (BENADRYL) 25 mg tablet Take 1 tablet (25 mg total) by mouth every 6 (six) hours, Starting Thu 5/23/2024, Normal           CONTINUE these medications which have NOT CHANGED    Details   albuterol (PROVENTIL HFA,VENTOLIN HFA) 90 mcg/act inhaler INL 2 PFS PO Q 4 H PRF WHZ, Historical Med      ALPRAZolam (XANAX) 1 mg tablet TAKE 1 TABLET BY MOUTH THREE TIMES A DAY AS NEEDED, Historical Med      atorvastatin (LIPITOR) 40 mg tablet Take 40 mg by mouth daily, Starting Fri 8/4/2023, Historical Med      bisacodyl (DULCOLAX) 10 mg suppository Insert 1 suppository (10 mg total) into the rectum daily, Starting Fri 9/29/2023, Normal      cyclobenzaprine (FLEXERIL) 10 mg tablet Take 1 tablet (10 mg total) by mouth 3 (three) times a day as needed for muscle spasms for up to 4 days, Starting Thu 1/25/2024, Until Mon 1/29/2024 at 2359, Normal      DULoxetine (CYMBALTA) 60 mg delayed release capsule Take 60 mg by mouth daily, Historical Med      fluticasone-umeclidinium-vilanterol 100-62.5-25 mcg/actuation inhaler Inhale, Historical Med      ibuprofen (MOTRIN) 200 mg tablet Take by mouth every 6 (six) hours as needed for mild pain, Historical Med      magnesium citrate (CITROMA) 1.745 g/30 mL oral solution Take 296 mL by mouth once for 1 dose, Starting Fri 9/29/2023, Normal      meloxicam (Mobic) 15 mg tablet Take 1 tablet (15 mg total) by mouth daily,  Starting Thu 2/8/2024, Normal      naproxen (NAPROSYN) 500 mg tablet Take 1 tablet (500 mg total) by mouth 2 (two) times a day with meals for 7 days, Starting Thu 1/25/2024, Until Thu 2/1/2024, Normal             No discharge procedures on file.    PDMP Review       None            ED Provider  Electronically Signed by             Allyssa Spencer PA-C  05/23/24 3753

## 2024-05-23 NOTE — DISCHARGE INSTRUCTIONS
DISCONTINUE TAKING COLCHICINE. FOLLOW UP WITH YOUR CARDIOLOGIST REGARDING THIS MEDICATION CHANGE. PLEASE SCHEDULE AN APPOINTMENT WITH DERMATOLOGY.    PLEASE REVIEW RETURN PRECAUTIONS AND ALL PAPERS THAT HAVE BEEN HANDED TO YOU TODAY.    Please  benadryl that has been sent to your pharmacy if itching persists.

## 2024-11-06 ENCOUNTER — NURSE TRIAGE (OUTPATIENT)
Age: 49
End: 2024-11-06

## 2024-11-06 NOTE — TELEPHONE ENCOUNTER
"New patient currently scheduled for visit 11/21/24 [place]. States he has had issues since lap chol September 2023 with RUQ pain. He has seen surgeon in past and evaluated in ED for pain . Patient states pain radiates to back and more constant now. He states pain will wake him up at night and at times it can be severe. His primary cannot get him in until December. Patient has not had any recent labs or imaging. He notes he is not jaundiced.  I did review that patient should report to ED for severe pain but he would prefer to avoid that and be seen in office. Patient required [place] appointment with his insurance. Please advise if urgent can be used to schedule earlier.      Reason for Disposition   Patient wants to be seen    Answer Assessment - Initial Assessment Questions  1. LOCATION: \"Where does it hurt?\"       Right side upper   2. RADIATION: \"Does the pain shoot anywhere else?\" (e.g., chest, back)      Radiates to back  3. ONSET: \"When did the pain begin?\" (Minutes, hours or days ago)       Started two/three weeks ago  4. SUDDEN: \"Gradual or sudden onset?\"      Had GB out year ago September  5. PATTERN \"Does the pain come and go, or is it constant?\"      constant  6. SEVERITY: \"How bad is the pain?\"  (e.g., Scale 1-10; mild, moderate, or severe)    Moderate -   severe  7. RECURRENT SYMPTOM: \"Have you ever had this type of stomach pain before?\" If Yes, ask: \"When was the last time?\" and \"What happened that time?\"       denies  8. CAUSE: \"What do you think is causing the stomach pain?\"      Symptoms started post op after gallbladder surgery  9. RELIEVING/AGGRAVATING FACTORS: \"What makes it better or worse?\" (e.g., antacids, bending or twisting motion, bowel movement)      Nothing helps  10. OTHER SYMPTOMS: \"Do you have any other symptoms?\" (e.g., back pain, diarrhea, fever, urination pain, vomiting)        Change in bowel habits, soft stool non formed stool, chills    Protocols used: Abdominal Pain - " Male-Adult-[place]

## 2024-11-06 NOTE — TELEPHONE ENCOUNTER
Regarding: Shooting Pain on the right side where the gall bladder going to the top  ----- Message from Kosta S sent at 11/6/2024  3:02 PM EST -----  Shooting Pain on the right side where the gall bladder going to the top

## 2024-11-18 ENCOUNTER — HOSPITAL ENCOUNTER (EMERGENCY)
Facility: HOSPITAL | Age: 49
Discharge: HOME | End: 2024-11-18
Attending: EMERGENCY MEDICINE | Admitting: EMERGENCY MEDICINE
Payer: COMMERCIAL

## 2024-11-18 ENCOUNTER — APPOINTMENT (EMERGENCY)
Dept: RADIOLOGY | Facility: HOSPITAL | Age: 49
End: 2024-11-18
Payer: COMMERCIAL

## 2024-11-18 VITALS
HEART RATE: 75 BPM | SYSTOLIC BLOOD PRESSURE: 126 MMHG | OXYGEN SATURATION: 99 % | RESPIRATION RATE: 19 BRPM | BODY MASS INDEX: 27.31 KG/M2 | TEMPERATURE: 97.6 F | DIASTOLIC BLOOD PRESSURE: 70 MMHG | WEIGHT: 207 LBS

## 2024-11-18 DIAGNOSIS — R10.9 ABDOMINAL PAIN: Primary | ICD-10-CM

## 2024-11-18 DIAGNOSIS — K29.70 GASTRITIS: ICD-10-CM

## 2024-11-18 LAB
ALBUMIN SERPL BCG-MCNC: 4.4 G/DL (ref 3.5–5)
ALP SERPL-CCNC: 96 U/L (ref 34–104)
ALT SERPL W P-5'-P-CCNC: 28 U/L (ref 7–52)
ANION GAP SERPL CALCULATED.3IONS-SCNC: 7 MMOL/L (ref 4–13)
AST SERPL W P-5'-P-CCNC: 22 U/L (ref 13–39)
BACTERIA UR QL AUTO: NORMAL /HPF
BASOPHILS # BLD AUTO: 0.06 THOUSANDS/ÂΜL (ref 0–0.1)
BASOPHILS NFR BLD AUTO: 1 % (ref 0–1)
BILIRUB SERPL-MCNC: 0.33 MG/DL (ref 0.2–1)
BILIRUB UR QL STRIP: NEGATIVE
BUN SERPL-MCNC: 10 MG/DL (ref 5–25)
CALCIUM SERPL-MCNC: 9.2 MG/DL (ref 8.4–10.2)
CHLORIDE SERPL-SCNC: 103 MMOL/L (ref 96–108)
CLARITY UR: CLEAR
CO2 SERPL-SCNC: 29 MMOL/L (ref 21–32)
COLOR UR: ABNORMAL
CREAT SERPL-MCNC: 1 MG/DL (ref 0.6–1.3)
EOSINOPHIL # BLD AUTO: 0.15 THOUSAND/ÂΜL (ref 0–0.61)
EOSINOPHIL NFR BLD AUTO: 2 % (ref 0–6)
ERYTHROCYTE [DISTWIDTH] IN BLOOD BY AUTOMATED COUNT: 13.1 % (ref 11.6–15.1)
GFR SERPL CREATININE-BSD FRML MDRD: 89 ML/MIN/1.73SQ M
GLUCOSE SERPL-MCNC: 91 MG/DL (ref 65–140)
GLUCOSE UR STRIP-MCNC: NEGATIVE MG/DL
HCT VFR BLD AUTO: 48.8 % (ref 36.5–49.3)
HGB BLD-MCNC: 16.1 G/DL (ref 12–17)
HGB UR QL STRIP.AUTO: NEGATIVE
IMM GRANULOCYTES # BLD AUTO: 0.02 THOUSAND/UL (ref 0–0.2)
IMM GRANULOCYTES NFR BLD AUTO: 0 % (ref 0–2)
KETONES UR STRIP-MCNC: NEGATIVE MG/DL
LEUKOCYTE ESTERASE UR QL STRIP: NEGATIVE
LIPASE SERPL-CCNC: 26 U/L (ref 11–82)
LYMPHOCYTES # BLD AUTO: 1.65 THOUSANDS/ÂΜL (ref 0.6–4.47)
LYMPHOCYTES NFR BLD AUTO: 21 % (ref 14–44)
MCH RBC QN AUTO: 28.1 PG (ref 26.8–34.3)
MCHC RBC AUTO-ENTMCNC: 33 G/DL (ref 31.4–37.4)
MCV RBC AUTO: 85 FL (ref 82–98)
MONOCYTES # BLD AUTO: 0.83 THOUSAND/ÂΜL (ref 0.17–1.22)
MONOCYTES NFR BLD AUTO: 11 % (ref 4–12)
NEUTROPHILS # BLD AUTO: 5.02 THOUSANDS/ÂΜL (ref 1.85–7.62)
NEUTS SEG NFR BLD AUTO: 65 % (ref 43–75)
NITRITE UR QL STRIP: NEGATIVE
NON-SQ EPI CELLS URNS QL MICRO: NORMAL /HPF
NRBC BLD AUTO-RTO: 0 /100 WBCS
PH UR STRIP.AUTO: 6 [PH]
PLATELET # BLD AUTO: 197 THOUSANDS/UL (ref 149–390)
PMV BLD AUTO: 10.5 FL (ref 8.9–12.7)
POTASSIUM SERPL-SCNC: 3.9 MMOL/L (ref 3.5–5.3)
PROT SERPL-MCNC: 7 G/DL (ref 6.4–8.4)
PROT UR STRIP-MCNC: ABNORMAL MG/DL
RBC # BLD AUTO: 5.73 MILLION/UL (ref 3.88–5.62)
RBC #/AREA URNS AUTO: NORMAL /HPF
SODIUM SERPL-SCNC: 139 MMOL/L (ref 135–147)
SP GR UR STRIP.AUTO: <1.005 (ref 1–1.03)
UROBILINOGEN UR STRIP-ACNC: <2 MG/DL
WBC # BLD AUTO: 7.73 THOUSAND/UL (ref 4.31–10.16)
WBC #/AREA URNS AUTO: NORMAL /HPF

## 2024-11-18 PROCEDURE — 99285 EMERGENCY DEPT VISIT HI MDM: CPT | Performed by: EMERGENCY MEDICINE

## 2024-11-18 PROCEDURE — 81001 URINALYSIS AUTO W/SCOPE: CPT

## 2024-11-18 PROCEDURE — 99284 EMERGENCY DEPT VISIT MOD MDM: CPT

## 2024-11-18 PROCEDURE — 80053 COMPREHEN METABOLIC PANEL: CPT

## 2024-11-18 PROCEDURE — 74177 CT ABD & PELVIS W/CONTRAST: CPT

## 2024-11-18 PROCEDURE — 83690 ASSAY OF LIPASE: CPT

## 2024-11-18 PROCEDURE — 96375 TX/PRO/DX INJ NEW DRUG ADDON: CPT

## 2024-11-18 PROCEDURE — 85025 COMPLETE CBC W/AUTO DIFF WBC: CPT

## 2024-11-18 PROCEDURE — 96374 THER/PROPH/DIAG INJ IV PUSH: CPT

## 2024-11-18 PROCEDURE — 93005 ELECTROCARDIOGRAM TRACING: CPT

## 2024-11-18 PROCEDURE — 36415 COLL VENOUS BLD VENIPUNCTURE: CPT

## 2024-11-18 RX ORDER — OMEPRAZOLE 40 MG/1
40 CAPSULE, DELAYED RELEASE ORAL 2 TIMES DAILY
COMMUNITY

## 2024-11-18 RX ORDER — PANTOPRAZOLE SODIUM 20 MG/1
20 TABLET, DELAYED RELEASE ORAL DAILY
Qty: 20 TABLET | Refills: 0 | Status: SHIPPED | OUTPATIENT
Start: 2024-11-18

## 2024-11-18 RX ORDER — HYDROMORPHONE HCL/PF 1 MG/ML
1 SYRINGE (ML) INJECTION ONCE
Refills: 0 | Status: COMPLETED | OUTPATIENT
Start: 2024-11-18 | End: 2024-11-18

## 2024-11-18 RX ORDER — ONDANSETRON 2 MG/ML
4 INJECTION INTRAMUSCULAR; INTRAVENOUS ONCE
Status: COMPLETED | OUTPATIENT
Start: 2024-11-18 | End: 2024-11-18

## 2024-11-18 RX ORDER — PANTOPRAZOLE SODIUM 40 MG/10ML
40 INJECTION, POWDER, LYOPHILIZED, FOR SOLUTION INTRAVENOUS ONCE
Status: COMPLETED | OUTPATIENT
Start: 2024-11-18 | End: 2024-11-18

## 2024-11-18 RX ORDER — COLCHICINE 0.6 MG/1
0.6 TABLET ORAL 2 TIMES DAILY
COMMUNITY

## 2024-11-18 RX ADMIN — HYDROMORPHONE HYDROCHLORIDE 1 MG: 1 INJECTION, SOLUTION INTRAMUSCULAR; INTRAVENOUS; SUBCUTANEOUS at 14:09

## 2024-11-18 RX ADMIN — PANTOPRAZOLE SODIUM 40 MG: 40 INJECTION, POWDER, FOR SOLUTION INTRAVENOUS at 16:12

## 2024-11-18 RX ADMIN — IOHEXOL 100 ML: 350 INJECTION, SOLUTION INTRAVENOUS at 15:01

## 2024-11-18 RX ADMIN — ONDANSETRON 4 MG: 2 INJECTION INTRAMUSCULAR; INTRAVENOUS at 14:08

## 2024-11-18 NOTE — ED PROVIDER NOTES
Time reflects when diagnosis was documented in both MDM as applicable and the Disposition within this note       Time User Action Codes Description Comment    11/18/2024  4:32 PM Yohan Houston Add [R10.9] Abdominal pain     11/18/2024  4:32 PM Yohan Houston Add [K29.70] Gastritis           ED Disposition       ED Disposition   Discharge    Condition   Stable    Date/Time   Mon Nov 18, 2024  4:32 PM    Comment   Humberto Valientedavina discharge to home/self care.                   Assessment & Plan       Medical Decision Making  Patient has upper abdominal pain radiating across the abdomen towards the back reminiscent of his gallbladder attacks.  Pain is worse with ingestion of greasy foods or alcohol.  I suspect pancreatitis    Risk  Prescription drug management.             Medications   ondansetron (ZOFRAN) injection 4 mg (4 mg Intravenous Given 11/18/24 1408)   HYDROmorphone (DILAUDID) injection 1 mg (1 mg Intravenous Given 11/18/24 1409)   iohexol (OMNIPAQUE) 350 MG/ML injection (MULTI-DOSE) 100 mL (100 mL Intravenous Given 11/18/24 1501)   pantoprazole (PROTONIX) injection 40 mg (40 mg Intravenous Given 11/18/24 1612)       ED Risk Strat Scores                                               History of Present Illness       Chief Complaint   Patient presents with    Abdominal Pain     States 2 weeks ago started with cramps in legs and hands. Now traveled to [facility] on both sides and radiates to back on both sides. No vomiting, able to eat. Some diarrhea       Past Medical History:   Diagnosis Date    Allergic rhinitis due to allergen 10/01/2018    Anxiety     Chronic pain     COPD (chronic obstructive pulmonary disease) (HCC)     Depression 10/17/2016    Diarrhea in adult patient     Hyperlipidemia     Pericarditis       Past Surgical History:   Procedure Laterality Date    EGD AND COLONOSCOPY      ELBOW ARTHROSCOPY      ELBOW SURGERY      x2    IR CHOLECYSTOSTOMY TUBE CHECK/CHANGE/REPOSITION/REINSERTION/UPSIZE   09/11/2023    IR CHOLECYSTOSTOMY TUBE PLACEMENT  07/28/2023    AK LAPAROSCOPY SURG CHOLECYSTECTOMY N/A 9/26/2023    Procedure: CHOLECYSTECTOMY LAPAROSCOPIC;  Surgeon: Nolberto Cooper MD;  Location: [facility];  Service: General    WRIST SURGERY        History reviewed. No pertinent family history.   Social History     Tobacco Use    Smoking status: Every Day     Current packs/day: 0.50     Average packs/day: 0.5 packs/day for 30.0 years (15.0 ttl pk-yrs)     Types: Cigarettes     Passive exposure: Never    Smokeless tobacco: Never   Vaping Use    Vaping status: Never Used   Substance Use Topics    Alcohol use: Not Currently    Drug use: Yes     Types: Marijuana     Comment: Is in pain mangement,  to get off pain meds.      E-Cigarette/Vaping    E-Cigarette Use Never User       E-Cigarette/Vaping Substances    Nicotine No     THC No     CBD No     Flavoring No     Other No     Unknown No       I have reviewed and agree with the history as documented.     Patient has a history of a complicated cholecystitis with cholecystostomy and interval cholecystectomy.  Patient states it was complicated by inflammation of both the liver and the pancreas.  Patient has not made any dietary modifications or lifestyle changes.  He continues to smoke cigarettes, and binge drink to excess at times.  He states she has had upper abdominal pain radiating across the upper abdomen in a bandlike fashion for at least 2 weeks.  The pain is associated with nausea and vomiting.  No bowel changes.  No fever.  Patient notes pain is worse since fried chicken wings and beer yesterday.  He has an upcoming appointment with GI and called that doctor referred him to the ED for evaluation        Review of Systems   Constitutional:  Negative for chills and fever.   HENT:  Negative for congestion, ear discharge and sore throat.    Eyes:  Negative for visual disturbance.   Respiratory:  Negative for cough, shortness of breath and stridor.    Cardiovascular:   Negative for chest pain.   Gastrointestinal:  Positive for abdominal pain, nausea and vomiting.   Genitourinary:  Negative for decreased urine volume and dysuria.   Musculoskeletal:  Positive for back pain.   Skin:  Negative for color change and rash.   Neurological:  Negative for weakness, numbness and headaches.   Hematological:  Does not bruise/bleed easily.   Psychiatric/Behavioral:  Negative for confusion.    All other systems reviewed and are negative.          Objective       ED Triage Vitals [11/18/24 1240]   Temperature Pulse Blood Pressure Respirations SpO2 Patient Position - Orthostatic VS   97.8 °F (36.6 °C) 79 147/82 (!) 24 97 % Sitting      Temp Source Heart Rate Source BP Location FiO2 (%) Pain Score    Tympanic Monitor Right arm -- 9      Vitals      Date and Time Temp Pulse SpO2 Resp BP Pain Score FACES Pain Rating User   11/18/24 1534 97.6 °F (36.4 °C) 68 98 % 20 131/76 -- -- SF   11/18/24 1439 -- -- -- -- -- 8 -- SF   11/18/24 1409 -- -- -- -- -- 9 --    11/18/24 1240 97.8 °F (36.6 °C) 79 97 % 24 147/82 9 -- LS            Physical Exam  Vitals and nursing note reviewed.   Constitutional:       Appearance: He is well-developed.   HENT:      Head: Normocephalic.      Mouth/Throat:      Mouth: Mucous membranes are moist.   Eyes:      General: No scleral icterus.     Extraocular Movements: Extraocular movements intact.   Cardiovascular:      Rate and Rhythm: Normal rate and regular rhythm.      Heart sounds: Normal heart sounds.   Pulmonary:      Effort: Pulmonary effort is normal.      Breath sounds: Normal breath sounds.   Abdominal:      General: Bowel sounds are normal.      Palpations: Abdomen is soft.      Tenderness: There is abdominal tenderness in the right upper quadrant, epigastric area and left upper quadrant. There is guarding.   Skin:     General: Skin is warm and dry.      Capillary Refill: Capillary refill takes less than 2 seconds.   Neurological:      General: No focal deficit  present.      Mental Status: He is alert and oriented to person, place, and time.   Psychiatric:         Mood and Affect: Mood normal.         Behavior: Behavior normal.         Results Reviewed       Procedure Component Value Units Date/Time    UA (URINE) with reflex to Scope [[id number]]  (Abnormal) Collected: 11/18/24 1620    Lab Status: Final result Specimen: Urine, Clean Catch Updated: 11/18/24 1625     Color, UA Light Yellow     Clarity, UA Clear     Specific Gravity, UA <1.005     pH, UA 6.0     Leukocytes, UA Negative     Nitrite, UA Negative     Protein, UA Trace mg/dl      Glucose, UA Negative mg/dl      Ketones, UA Negative mg/dl      Urobilinogen, UA <2.0 mg/dl      Bilirubin, UA Negative     Occult Blood, UA Negative    Urine Microscopic [[id number]] Collected: 11/18/24 1620    Lab Status: In process Specimen: Urine, Clean Catch Updated: 11/18/24 1625    Comprehensive metabolic panel [[id number]] Collected: 11/18/24 1349    Lab Status: Final result Specimen: Blood from Arm, Right Updated: 11/18/24 1421     Sodium 139 mmol/L      Potassium 3.9 mmol/L      Chloride 103 mmol/L      CO2 29 mmol/L      ANION GAP 7 mmol/L      BUN 10 mg/dL      Creatinine 1.00 mg/dL      Glucose 91 mg/dL      Calcium 9.2 mg/dL      AST 22 U/L      ALT 28 U/L      Alkaline Phosphatase 96 U/L      Total Protein 7.0 g/dL      Albumin 4.4 g/dL      Total Bilirubin 0.33 mg/dL      eGFR 89 ml/min/1.73sq m     Narrative:      National Kid[employer] Foundation guidelines for Chronic Kidney Disease (CKD):     Stage 1 with normal or high GFR (GFR > 90 mL/min/1.73 square meters)    Stage 2 Mild CKD (GFR = 60-89 mL/min/1.73 square meters)    Stage 3A Moderate CKD (GFR = 45-59 mL/min/1.73 square meters)    Stage 3B Moderate CKD (GFR = 30-44 mL/min/1.73 square meters)    Stage 4 Severe CKD (GFR = 15-29 mL/min/1.73 square meters)    Stage 5 End Stage CKD (GFR <15 mL/min/1.73 square meters)  Note: GFR calculation is accurate only with a steady  state creatinine    Lipase [[id number]]  (Normal) Collected: 11/18/24 1349    Lab Status: Final result Specimen: Blood from Arm, Right Updated: 11/18/24 1421     Lipase 26 u/L     CBC and differential [[id number]]  (Abnormal) Collected: 11/18/24 1349    Lab Status: Final result Specimen: Blood from Arm, Right Updated: 11/18/24 1355     WBC 7.73 Thousand/uL      RBC 5.73 Million/uL      Hemoglobin 16.1 g/dL      Hematocrit 48.8 %      MCV 85 fL      MCH 28.1 pg      MCHC 33.0 g/dL      RDW 13.1 %      MPV 10.5 fL      Platelets 197 Thousands/uL      nRBC 0 /100 WBCs      Segmented % 65 %      Immature Grans % 0 %      Lymphocytes % 21 %      Monocytes % 11 %      Eosinophils Relative 2 %      Basophils Relative 1 %      Absolute Neutrophils 5.02 Thousands/µL      Absolute Immature Grans 0.02 Thousand/uL      Absolute Lymphocytes 1.65 Thousands/µL      Absolute Monocytes 0.83 Thousand/µL      Eosinophils Absolute 0.15 Thousand/µL      Basophils Absolute 0.06 Thousands/µL             CT abdomen pelvis with contrast   Final Interpretation by Claus Hitchcock MD (11/18 1607)      No acute abnormality in the abdomen or pelvis.         Workstation performed: [id number]             ECG 12 Lead Documentation Only    Date/Time: 11/18/2024 2:12 PM    Performed by: Yohan Houtson MD  Authorized by: Yohan Houston MD    Indications / Diagnosis:  Epigastric pain  ECG reviewed by me, the ED Provider: yes    Patient location:  ED  Interpretation:     Interpretation: normal    Rate:     ECG rate:  95    ECG rate assessment: normal    Rhythm:     Rhythm: sinus rhythm    Ectopy:     Ectopy: none    QRS:     QRS axis:  Normal    QRS intervals:  Normal  Conduction:     Conduction: normal    ST segments:     ST segments:  Normal  T waves:     T waves: normal        ED Medication and Procedure Management   Prior to Admission Medications   Prescriptions Last Dose Informant Patient Reported? Taking?   ALPRAZolam (XANAX) 1 mg tablet   Yes  No   Sig: TAKE 1 TABLET BY MOUTH THREE TIMES A DAY AS NEEDED   DULoxetine (CYMBALTA) 60 mg delayed release capsule   Yes No   Sig: Take 60 mg by mouth daily   Patient not taking: Reported on 10/11/2023   albuterol (PROVENTIL HFA,VENTOLIN HFA) 90 mcg/act inhaler   Yes No   Sig: INL 2 PFS PO Q 4 H PRF WHZ   atorvastatin (LIPITOR) 40 mg tablet   Yes No   Sig: Take 40 mg by mouth daily   Patient not taking: Reported on 10/11/2023   bisacodyl (DULCOLAX) 10 mg suppository   No No   Sig: Insert 1 suppository (10 mg total) into the rectum daily   Patient not taking: Reported on 10/11/2023   colchicine (COLCRYS) 0.6 mg tablet   Yes Yes   Sig: Take 0.6 mg by mouth 2 (two) times a day   cyclobenzaprine (FLEXERIL) 10 mg tablet   No No   Sig: Take 1 tablet (10 mg total) by mouth 3 (three) times a day as needed for muscle spasms for up to 4 days   diphenhydrAMINE (BENADRYL) 25 mg tablet   No No   Sig: Take 1 tablet (25 mg total) by mouth every 6 (six) hours   fluticasone-umeclidinium-vilanterol 100-62.5-25 mcg/actuation inhaler   Yes No   Sig: Inhale   Patient not taking: Reported on 10/11/2023   ibuprofen (MOTRIN) 200 mg tablet   Yes No   Sig: Take by mouth every 6 (six) hours as needed for mild pain   Patient not taking: Reported on 2/8/2024   magnesium citrate (CITROMA) 1.745 g/30 mL oral solution   No No   Sig: Take 296 mL by mouth once for 1 dose   meloxicam (Mobic) 15 mg tablet Not Taking  No No   Sig: Take 1 tablet (15 mg total) by mouth daily   Patient not taking: Reported on 11/18/2024   naproxen (NAPROSYN) 500 mg tablet   No No   Sig: Take 1 tablet (500 mg total) by mouth 2 (two) times a day with meals for 7 days   omeprazole (PriLOSEC) 40 MG capsule   Yes Yes   Sig: Take 40 mg by mouth 2 (two) times a day      Facility-Administered Medications Last Administration Doses Remaining   dexamethasone (DECADRON) injection 4 mg None recorded 1   triamcinolone acetonide (KENALOG-40) 40 mg/mL injection 40 mg None recorded 1    triamcinolone acetonide (KENALOG-40) 40 mg/mL injection 40 mg 2/15/2024  4:00 PM         Patient's Medications   Discharge Prescriptions    PANTOPRAZOLE (PROTONIX) 20 MG TABLET    Take 1 tablet (20 mg total) by mouth daily       Start Date: 11/18/2024End Date: --       Order Dose: 20 mg       Quantity: 20 tablet    Refills: 0     No discharge procedures on file.  ED SEPSIS DOCUMENTATION   Time reflects when diagnosis was documented in both MDM as applicable and the Disposition within this note       Time User Action Codes Description Comment    11/18/2024  4:32 PM Yohan Houston [R10.9] Abdominal pain     11/18/2024  4:32 PM Yohan Houston [K29.70] Gastritis                  Yohan Houston MD  11/18/24 9018

## 2024-11-19 LAB
ATRIAL RATE: 95 BPM
P AXIS: 59 DEGREES
PR INTERVAL: 134 MS
QRS AXIS: 42 DEGREES
QRSD INTERVAL: 88 MS
QT INTERVAL: 350 MS
QTC INTERVAL: 440 MS
T WAVE AXIS: 45 DEGREES
VENTRICULAR RATE: 95 BPM

## 2024-11-19 PROCEDURE — 93010 ELECTROCARDIOGRAM REPORT: CPT | Performed by: INTERNAL MEDICINE

## 2024-12-30 ENCOUNTER — OFFICE VISIT (OUTPATIENT)
Dept: GASTROENTEROLOGY | Facility: CLINIC | Age: 49
End: 2024-12-30
Payer: COMMERCIAL

## 2024-12-30 VITALS
HEIGHT: 73 IN | WEIGHT: 207.2 LBS | BODY MASS INDEX: 27.46 KG/M2 | TEMPERATURE: 98 F | SYSTOLIC BLOOD PRESSURE: 126 MMHG | DIASTOLIC BLOOD PRESSURE: 80 MMHG

## 2024-12-30 DIAGNOSIS — Z86.0100 HISTORY OF COLON POLYPS: ICD-10-CM

## 2024-12-30 DIAGNOSIS — K29.70 GASTRITIS: ICD-10-CM

## 2024-12-30 DIAGNOSIS — R14.0 BLOATING: ICD-10-CM

## 2024-12-30 DIAGNOSIS — G89.29 CHRONIC ABDOMINAL PAIN: Primary | ICD-10-CM

## 2024-12-30 DIAGNOSIS — R07.9 CHEST PAIN, UNSPECIFIED TYPE: ICD-10-CM

## 2024-12-30 DIAGNOSIS — R10.9 CHRONIC ABDOMINAL PAIN: Primary | ICD-10-CM

## 2024-12-30 PROCEDURE — 99204 OFFICE O/P NEW MOD 45 MIN: CPT | Performed by: INTERNAL MEDICINE

## 2024-12-30 RX ORDER — PANTOPRAZOLE SODIUM 40 MG/1
40 TABLET, DELAYED RELEASE ORAL DAILY
Qty: 30 TABLET | Refills: 5 | Status: SHIPPED | OUTPATIENT
Start: 2024-12-30 | End: 2024-12-30

## 2024-12-30 RX ORDER — SODIUM CHLORIDE, SODIUM LACTATE, POTASSIUM CHLORIDE, CALCIUM CHLORIDE 600; 310; 30; 20 MG/100ML; MG/100ML; MG/100ML; MG/100ML
125 INJECTION, SOLUTION INTRAVENOUS CONTINUOUS
Status: CANCELLED | OUTPATIENT
Start: 2024-12-30

## 2024-12-30 RX ORDER — SUCRALFATE 1 G/1
1 TABLET ORAL 4 TIMES DAILY
Qty: 120 TABLET | Refills: 0 | Status: SHIPPED | OUTPATIENT
Start: 2024-12-30 | End: 2025-01-29

## 2024-12-30 RX ORDER — BISACODYL 5 MG/1
20 TABLET, DELAYED RELEASE ORAL ONCE
Qty: 4 TABLET | Refills: 0 | Status: SHIPPED | OUTPATIENT
Start: 2024-12-30 | End: 2024-12-30

## 2024-12-30 NOTE — PATIENT INSTRUCTIONS
Scheduled date of EGD(as of today): 1/9/25  Physician performing EGD: Dr. Han  Location of EGD:   Instructions reviewed with patient by: Latha  Clearances: none    Scheduled date of colonoscopy (as of today): 1/28/25  Physician performing colonoscopy: Dr. Han  Location of colonoscopy:   Bowel prep reviewed with patient: Golytely  Instructions reviewed with patient by: Latha  Clearances: none

## 2024-12-31 NOTE — H&P (VIEW-ONLY)
Name: Humberto De Guzman      : 1975      MRN: [id number]  Encounter Provider: Dg Han MD  Encounter Date: 2024   Encounter department: [facility] [place]  :  Assessment & Plan  Chronic abdominal pain  Patient having chronic symptoms of abdominal pain, bloating and chest pain.  They are partially relieved with omeprazole.  He was started on pantoprazole as well recently but that has not improved symptoms either.  Told patient to stop pantoprazole.  Continue omeprazole.  Will add Carafate to his regimen to give him some relief.  Discussed with patient the differentials for patient's symptoms include esophageal spasms, esophagitis, gastritis, peptic ulcer disease, H. pylori infection, functional pain.  Discussed with patient about evaluating with EGD.  Patient agreeable.  Ordered the procedure.  He is soon going to be due for colonoscopy due to history of colon polyps.  He had 1 large polyp removed during prior colonoscopy.  He is agreeable to get repeat procedure done which was ordered today along with bowel prep.  Orders:    EGD; Future    sucralfate (CARAFATE) 1 g tablet; Take 1 tablet (1 g total) by mouth 4 (four) times a day    Chest pain, unspecified type  As above.  Orders:    EGD; Future    sucralfate (CARAFATE) 1 g tablet; Take 1 tablet (1 g total) by mouth 4 (four) times a day    History of colon polyps  As above.  Orders:    Colonoscopy; Future    bisacodyl (DULCOLAX) 5 mg EC tablet; Take 4 tablets (20 mg total) by mouth once for 1 dose Colonoscopy prep    polyethylene glycol (GOLYTELY) 4000 mL solution; Take 4,000 mL by mouth once for 1 dose Colonoscopy prep    Bloating  As above.    Dg Han MD  Gastroenterology  [facility]  Date: 2024    Orders:    EGD; Future    sucralfate (CARAFATE) 1 g tablet; Take 1 tablet (1 g total) by mouth 4 (four) times a day        History of Present Illness   Abdominal  Pain  Pertinent negatives include no arthralgias, dysuria, fever, hematuria or vomiting.   Chest Pain   Associated symptoms include abdominal pain. Pertinent negatives include no back pain, cough, fever, palpitations, shortness of breath or vomiting.   Pertinent negatives for past medical history include no seizures.     Humberto De Guzman is a 49 y.o. male with COPD, status post cholecystectomy, hyperlipidemia presents for evaluation.    Patient had ER visit on 11/18/2024 with abdominal pain and softer stools.  The note from the visit was reviewed by me today.  Patient had labs done during the ER visit which were reviewed by me and showed normal blood counts, renal function, liver test, urinalysis and lipase.  CT abdomen was performed the results of which were reviewed by me today and found to be unremarkable.  GI referral was made and pantoprazole was started.    Patient reports having chest pain and abdominal pain for the last 1 to 1-1/2 years.  Symptoms occur intermittently.  He seen cardiologist and cardiac workup has been negative including stress test.  He had stress test in April 2024 the results of which were reviewed by me today and found to be negative for any cardiac schema.  His symptoms do not change with meal intake.    He has been taking omeprazole once a day but that has only improved the symptoms somewhat.  No nausea, vomiting or heartburn. He reports normal bowel movements.  No blood in stools.  His weight has been stable over the past 1 year.  No family history of colon cancer.  He reports daily Advil intake.  Occasional alcohol intake.  He reports using marijuana and current smoker of cigarettes.    Patient had colonoscopy at outside hospital in 2022 the results of which were reviewed by me today.  Liver 2 polyps removed.  One of the polyps was large in size.  Both polyps were benign but precancerous.  Repeat was recommended in 3 years.  Patient had EGD in 2022 which showed  "esophagitis.          Review of Systems   Constitutional:  Negative for chills and fever.   HENT:  Negative for ear pain and sore throat.    Eyes:  Negative for pain and visual disturbance.   Respiratory:  Negative for cough and shortness of breath.    Cardiovascular:  Positive for chest pain. Negative for palpitations.   Gastrointestinal:  Positive for abdominal pain. Negative for vomiting.   Genitourinary:  Negative for dysuria and hematuria.   Musculoskeletal:  Negative for arthralgias and back pain.   Skin:  Negative for color change and rash.   Neurological:  Negative for seizures and syncope.   All other systems reviewed and are negative.         Objective   /80   Temp 98 °F (36.7 °C)   Ht 6' 1\" (1.854 m)   Wt 94 kg (207 lb 3.2 oz)   BMI 27.34 kg/m²      Physical Exam  Vitals and nursing note reviewed.   Constitutional:       General: He is not in acute distress.     Appearance: He is well-developed.   HENT:      Head: Normocephalic and atraumatic.   Eyes:      Conjunctiva/sclera: Conjunctivae normal.   Cardiovascular:      Rate and Rhythm: Normal rate and regular rhythm.      Heart sounds: No murmur heard.  Pulmonary:      Effort: Pulmonary effort is normal. No respiratory distress.      Breath sounds: Normal breath sounds.   Abdominal:      Palpations: Abdomen is soft.      Tenderness: There is no abdominal tenderness.   Musculoskeletal:         General: No swelling.      Cervical back: Neck supple.   Skin:     General: Skin is warm and dry.      Capillary Refill: Capillary refill takes less than 2 seconds.   Neurological:      Mental Status: He is alert.   Psychiatric:         Mood and Affect: Mood normal.           "

## 2024-12-31 NOTE — PROGRESS NOTES
Name: Humberto De Guzman      : 1975      MRN: [id number]  Encounter Provider: Dg Han MD  Encounter Date: 2024   Encounter department: [facility] [place]  :  Assessment & Plan  Chronic abdominal pain  Patient having chronic symptoms of abdominal pain, bloating and chest pain.  They are partially relieved with omeprazole.  He was started on pantoprazole as well recently but that has not improved symptoms either.  Told patient to stop pantoprazole.  Continue omeprazole.  Will add Carafate to his regimen to give him some relief.  Discussed with patient the differentials for patient's symptoms include esophageal spasms, esophagitis, gastritis, peptic ulcer disease, H. pylori infection, functional pain.  Discussed with patient about evaluating with EGD.  Patient agreeable.  Ordered the procedure.  He is soon going to be due for colonoscopy due to history of colon polyps.  He had 1 large polyp removed during prior colonoscopy.  He is agreeable to get repeat procedure done which was ordered today along with bowel prep.  Orders:    EGD; Future    sucralfate (CARAFATE) 1 g tablet; Take 1 tablet (1 g total) by mouth 4 (four) times a day    Chest pain, unspecified type  As above.  Orders:    EGD; Future    sucralfate (CARAFATE) 1 g tablet; Take 1 tablet (1 g total) by mouth 4 (four) times a day    History of colon polyps  As above.  Orders:    Colonoscopy; Future    bisacodyl (DULCOLAX) 5 mg EC tablet; Take 4 tablets (20 mg total) by mouth once for 1 dose Colonoscopy prep    polyethylene glycol (GOLYTELY) 4000 mL solution; Take 4,000 mL by mouth once for 1 dose Colonoscopy prep    Bloating  As above.    Dg Han MD  Gastroenterology  [facility]  Date: 2024    Orders:    EGD; Future    sucralfate (CARAFATE) 1 g tablet; Take 1 tablet (1 g total) by mouth 4 (four) times a day        History of Present Illness   Abdominal  Pain  Pertinent negatives include no arthralgias, dysuria, fever, hematuria or vomiting.   Chest Pain   Associated symptoms include abdominal pain. Pertinent negatives include no back pain, cough, fever, palpitations, shortness of breath or vomiting.   Pertinent negatives for past medical history include no seizures.     Humberto De Guzman is a 49 y.o. male with COPD, status post cholecystectomy, hyperlipidemia presents for evaluation.    Patient had ER visit on 11/18/2024 with abdominal pain and softer stools.  The note from the visit was reviewed by me today.  Patient had labs done during the ER visit which were reviewed by me and showed normal blood counts, renal function, liver test, urinalysis and lipase.  CT abdomen was performed the results of which were reviewed by me today and found to be unremarkable.  GI referral was made and pantoprazole was started.    Patient reports having chest pain and abdominal pain for the last 1 to 1-1/2 years.  Symptoms occur intermittently.  He seen cardiologist and cardiac workup has been negative including stress test.  He had stress test in April 2024 the results of which were reviewed by me today and found to be negative for any cardiac schema.  His symptoms do not change with meal intake.    He has been taking omeprazole once a day but that has only improved the symptoms somewhat.  No nausea, vomiting or heartburn. He reports normal bowel movements.  No blood in stools.  His weight has been stable over the past 1 year.  No family history of colon cancer.  He reports daily Advil intake.  Occasional alcohol intake.  He reports using marijuana and current smoker of cigarettes.    Patient had colonoscopy at outside hospital in 2022 the results of which were reviewed by me today.  Liver 2 polyps removed.  One of the polyps was large in size.  Both polyps were benign but precancerous.  Repeat was recommended in 3 years.  Patient had EGD in 2022 which showed  "esophagitis.          Review of Systems   Constitutional:  Negative for chills and fever.   HENT:  Negative for ear pain and sore throat.    Eyes:  Negative for pain and visual disturbance.   Respiratory:  Negative for cough and shortness of breath.    Cardiovascular:  Positive for chest pain. Negative for palpitations.   Gastrointestinal:  Positive for abdominal pain. Negative for vomiting.   Genitourinary:  Negative for dysuria and hematuria.   Musculoskeletal:  Negative for arthralgias and back pain.   Skin:  Negative for color change and rash.   Neurological:  Negative for seizures and syncope.   All other systems reviewed and are negative.         Objective   /80   Temp 98 °F (36.7 °C)   Ht 6' 1\" (1.854 m)   Wt 94 kg (207 lb 3.2 oz)   BMI 27.34 kg/m²      Physical Exam  Vitals and nursing note reviewed.   Constitutional:       General: He is not in acute distress.     Appearance: He is well-developed.   HENT:      Head: Normocephalic and atraumatic.   Eyes:      Conjunctiva/sclera: Conjunctivae normal.   Cardiovascular:      Rate and Rhythm: Normal rate and regular rhythm.      Heart sounds: No murmur heard.  Pulmonary:      Effort: Pulmonary effort is normal. No respiratory distress.      Breath sounds: Normal breath sounds.   Abdominal:      Palpations: Abdomen is soft.      Tenderness: There is no abdominal tenderness.   Musculoskeletal:         General: No swelling.      Cervical back: Neck supple.   Skin:     General: Skin is warm and dry.      Capillary Refill: Capillary refill takes less than 2 seconds.   Neurological:      Mental Status: He is alert.   Psychiatric:         Mood and Affect: Mood normal.           "

## 2025-01-03 ENCOUNTER — TELEPHONE (OUTPATIENT)
Dept: GASTROENTEROLOGY | Facility: CLINIC | Age: 50
End: 2025-01-03

## 2025-01-03 NOTE — TELEPHONE ENCOUNTER
----- Message from Libby LIZARRAGA sent at 1/3/2025 11:20 AM EST -----  Regarding: FW: [employer] [place] EPO    ----- Message -----  From: Niurka Li  Sent: 12/31/2024   8:29 AM EST  To: #  Subject: [employer] [place] E[place]                               Good morning! Humberto roberts [employer] EPO is only accepted at our [place] locations. He is scheduled 1/9/2025 at [place]. Please reschedule his appointment. Thank you.

## 2025-01-03 NOTE — TELEPHONE ENCOUNTER
Scheduled date of EGD(as of today): 01/09/2025  Physician performing EGD: Dr. Arriaga   Location of EGD: [place]  Instructions reviewed with patient by: Libby   Clearances: N/A     Moved due to insurance issues.  Pt asking to keep 01/28/2025 colonoscopy with Dr. Han.

## 2025-01-06 ENCOUNTER — TELEPHONE (OUTPATIENT)
Dept: GASTROENTEROLOGY | Facility: CLINIC | Age: 50
End: 2025-01-06

## 2025-01-06 NOTE — TELEPHONE ENCOUNTER
Spoke to pt confirming pt's EGD scheduled on 1/9/25 at [facility] with Dr Arriaga.  Informed [facility] would be calling the day prior with the arrival time.  Pt has instructions and did not have any questions.

## 2025-01-09 ENCOUNTER — ANESTHESIA (OUTPATIENT)
Dept: GASTROENTEROLOGY | Facility: AMBULARY SURGERY CENTER | Age: 50
End: 2025-01-09
Payer: COMMERCIAL

## 2025-01-09 ENCOUNTER — HOSPITAL ENCOUNTER (OUTPATIENT)
Dept: GASTROENTEROLOGY | Facility: AMBULARY SURGERY CENTER | Age: 50
Setting detail: OUTPATIENT SURGERY
End: 2025-01-09
Attending: INTERNAL MEDICINE
Payer: COMMERCIAL

## 2025-01-09 VITALS
SYSTOLIC BLOOD PRESSURE: 135 MMHG | TEMPERATURE: 99 F | RESPIRATION RATE: 18 BRPM | OXYGEN SATURATION: 99 % | DIASTOLIC BLOOD PRESSURE: 82 MMHG | HEART RATE: 89 BPM

## 2025-01-09 DIAGNOSIS — G89.29 CHRONIC ABDOMINAL PAIN: ICD-10-CM

## 2025-01-09 DIAGNOSIS — R10.9 CHRONIC ABDOMINAL PAIN: ICD-10-CM

## 2025-01-09 DIAGNOSIS — R07.9 CHEST PAIN, UNSPECIFIED TYPE: ICD-10-CM

## 2025-01-09 DIAGNOSIS — R14.0 BLOATING: ICD-10-CM

## 2025-01-09 PROCEDURE — 43239 EGD BIOPSY SINGLE/MULTIPLE: CPT | Performed by: INTERNAL MEDICINE

## 2025-01-09 PROCEDURE — 88305 TISSUE EXAM BY PATHOLOGIST: CPT | Performed by: PATHOLOGY

## 2025-01-09 PROCEDURE — 88313 SPECIAL STAINS GROUP 2: CPT | Performed by: PATHOLOGY

## 2025-01-09 RX ORDER — SODIUM CHLORIDE, SODIUM LACTATE, POTASSIUM CHLORIDE, CALCIUM CHLORIDE 600; 310; 30; 20 MG/100ML; MG/100ML; MG/100ML; MG/100ML
INJECTION, SOLUTION INTRAVENOUS CONTINUOUS PRN
Status: DISCONTINUED | OUTPATIENT
Start: 2025-01-09 | End: 2025-01-09

## 2025-01-09 RX ORDER — SODIUM CHLORIDE, SODIUM LACTATE, POTASSIUM CHLORIDE, CALCIUM CHLORIDE 600; 310; 30; 20 MG/100ML; MG/100ML; MG/100ML; MG/100ML
125 INJECTION, SOLUTION INTRAVENOUS CONTINUOUS
Status: DISCONTINUED | OUTPATIENT
Start: 2025-01-09 | End: 2025-01-13 | Stop reason: HOSPADM

## 2025-01-09 RX ORDER — LIDOCAINE HYDROCHLORIDE 10 MG/ML
INJECTION, SOLUTION EPIDURAL; INFILTRATION; INTRACAUDAL; PERINEURAL AS NEEDED
Status: DISCONTINUED | OUTPATIENT
Start: 2025-01-09 | End: 2025-01-09

## 2025-01-09 RX ORDER — PROPOFOL 10 MG/ML
INJECTION, EMULSION INTRAVENOUS AS NEEDED
Status: DISCONTINUED | OUTPATIENT
Start: 2025-01-09 | End: 2025-01-09

## 2025-01-09 RX ADMIN — PROPOFOL 100 MG: 10 INJECTION, EMULSION INTRAVENOUS at 08:46

## 2025-01-09 RX ADMIN — SODIUM CHLORIDE, SODIUM LACTATE, POTASSIUM CHLORIDE, AND CALCIUM CHLORIDE 125 ML/HR: .6; .31; .03; .02 INJECTION, SOLUTION INTRAVENOUS at 08:33

## 2025-01-09 RX ADMIN — PROPOFOL 200 MG: 10 INJECTION, EMULSION INTRAVENOUS at 08:44

## 2025-01-09 RX ADMIN — LIDOCAINE HYDROCHLORIDE 100 MG: 10 INJECTION, SOLUTION EPIDURAL; INFILTRATION; INTRACAUDAL; PERINEURAL at 08:44

## 2025-01-09 RX ADMIN — SODIUM CHLORIDE, SODIUM LACTATE, POTASSIUM CHLORIDE, AND CALCIUM CHLORIDE: .6; .31; .03; .02 INJECTION, SOLUTION INTRAVENOUS at 08:37

## 2025-01-09 NOTE — ANESTHESIA POSTPROCEDURE EVALUATION
Post-Op Assessment Note    CV Status:  Stable  Pain Score: 0    Pain management: adequate       Mental Status:  Sleepy   Hydration Status:  Stable   PONV Controlled:  None   Airway Patency:  Patent     Post Op Vitals Reviewed: Yes    No anethesia notable event occurred.    Staff: CRNA           Last Filed PACU Vitals:  Vitals Value Taken Time   Temp     Pulse 113    /70    Resp 24    SpO2 94

## 2025-01-09 NOTE — INTERVAL H&P NOTE
H&P reviewed. After examining the patient I find no changes in the patients condition since the H&P had been written.    Vitals:    01/09/25 0828   BP: 137/84   Pulse: (!) 106   Resp: 18   Temp: 99 °F (37.2 °C)   SpO2: 96%

## 2025-01-09 NOTE — ANESTHESIA PREPROCEDURE EVALUATION
Procedure:  EGD    Relevant Problems   CARDIO   (+) Headache, variant migraine      NEURO/PSYCH   (+) Anxiety   (+) Depression   (+) Headache, variant migraine        Physical Exam    Airway    Mallampati score: II  TM Distance: >3 FB  Neck ROM: full     Dental   No notable dental hx     Cardiovascular  Cardiovascular exam normal    Pulmonary  Pulmonary exam normal     Other Findings        Anesthesia Plan  ASA Score- 2     Anesthesia Type- IV sedation with anesthesia with ASA Monitors.         Additional Monitors:     Airway Plan:            Plan Factors-Exercise tolerance (METS): >4 METS.    Chart reviewed.   Existing labs reviewed. Patient summary reviewed.    Patient is a current smoker.  Patient smoked on day of surgery.            Induction-     Postoperative Plan-     Perioperative Resuscitation Plan - Level 1 - Full Code.       Informed Consent- Anesthetic plan and risks discussed with patient.  I personally reviewed this patient with the CRNA. Discussed and agreed on the Anesthesia Plan with the CRNA..

## 2025-01-14 PROCEDURE — 88313 SPECIAL STAINS GROUP 2: CPT | Performed by: PATHOLOGY

## 2025-01-14 PROCEDURE — 88305 TISSUE EXAM BY PATHOLOGIST: CPT | Performed by: PATHOLOGY

## 2025-01-15 ENCOUNTER — RESULTS FOLLOW-UP (OUTPATIENT)
Dept: GASTROENTEROLOGY | Facility: AMBULARY SURGERY CENTER | Age: 50
End: 2025-01-15

## 2025-01-15 DIAGNOSIS — K22.70 BARRETT'S ESOPHAGUS WITHOUT DYSPLASIA: Primary | ICD-10-CM

## 2025-01-15 RX ORDER — OMEPRAZOLE 40 MG/1
40 CAPSULE, DELAYED RELEASE ORAL DAILY
Qty: 30 CAPSULE | Refills: 11 | Status: SHIPPED | OUTPATIENT
Start: 2025-01-15

## 2025-01-15 NOTE — TELEPHONE ENCOUNTER
----- Message from Soraya Arriaga MD sent at 1/15/2025  1:29 PM EST -----  Gastric biopsies came back as benign and negative for H. pylori.  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  Patient to take omeprazole daily.  Repeat EGD in 3 years

## 2025-01-15 NOTE — LETTER
January 17, 2025    Humberto De Guzman  [address] [facility] [address]      Dear  Gab:      Our office has attempted to call you in regards to your results, however, we have been unable to get a hold of you. Please give our office a call so we can review your non-urgent results and answer any questions you may have.                Sincerely,             [facility]

## 2025-01-16 ENCOUNTER — ANESTHESIA (OUTPATIENT)
Dept: ANESTHESIOLOGY | Facility: HOSPITAL | Age: 50
End: 2025-01-16

## 2025-01-16 ENCOUNTER — ANESTHESIA EVENT (OUTPATIENT)
Dept: ANESTHESIOLOGY | Facility: HOSPITAL | Age: 50
End: 2025-01-16

## 2025-01-22 ENCOUNTER — HOSPITAL ENCOUNTER (OUTPATIENT)
Dept: GASTROENTEROLOGY | Facility: AMBULARY SURGERY CENTER | Age: 50
Setting detail: OUTPATIENT SURGERY
Discharge: HOME | End: 2025-01-22
Attending: INTERNAL MEDICINE
Payer: COMMERCIAL

## 2025-01-22 ENCOUNTER — ANESTHESIA EVENT (OUTPATIENT)
Dept: GASTROENTEROLOGY | Facility: AMBULARY SURGERY CENTER | Age: 50
End: 2025-01-22
Payer: COMMERCIAL

## 2025-01-22 VITALS
WEIGHT: 207 LBS | HEART RATE: 68 BPM | BODY MASS INDEX: 27.43 KG/M2 | SYSTOLIC BLOOD PRESSURE: 122 MMHG | HEIGHT: 73 IN | TEMPERATURE: 99.3 F | OXYGEN SATURATION: 98 % | DIASTOLIC BLOOD PRESSURE: 79 MMHG | RESPIRATION RATE: 16 BRPM

## 2025-01-22 DIAGNOSIS — Z86.0100 HISTORY OF COLON POLYPS: ICD-10-CM

## 2025-01-22 PROCEDURE — 88305 TISSUE EXAM BY PATHOLOGIST: CPT | Performed by: PATHOLOGY

## 2025-01-22 PROCEDURE — 45385 COLONOSCOPY W/LESION REMOVAL: CPT | Performed by: INTERNAL MEDICINE

## 2025-01-22 RX ORDER — SODIUM CHLORIDE, SODIUM LACTATE, POTASSIUM CHLORIDE, CALCIUM CHLORIDE 600; 310; 30; 20 MG/100ML; MG/100ML; MG/100ML; MG/100ML
INJECTION, SOLUTION INTRAVENOUS CONTINUOUS PRN
Status: DISCONTINUED | OUTPATIENT
Start: 2025-01-22 | End: 2025-01-22

## 2025-01-22 RX ORDER — PROPOFOL 10 MG/ML
INJECTION, EMULSION INTRAVENOUS AS NEEDED
Status: DISCONTINUED | OUTPATIENT
Start: 2025-01-22 | End: 2025-01-22

## 2025-01-22 RX ORDER — LIDOCAINE HYDROCHLORIDE 10 MG/ML
INJECTION, SOLUTION EPIDURAL; INFILTRATION; INTRACAUDAL; PERINEURAL AS NEEDED
Status: DISCONTINUED | OUTPATIENT
Start: 2025-01-22 | End: 2025-01-22

## 2025-01-22 RX ORDER — SODIUM CHLORIDE, SODIUM LACTATE, POTASSIUM CHLORIDE, CALCIUM CHLORIDE 600; 310; 30; 20 MG/100ML; MG/100ML; MG/100ML; MG/100ML
125 INJECTION, SOLUTION INTRAVENOUS CONTINUOUS
Status: DISCONTINUED | OUTPATIENT
Start: 2025-01-22 | End: 2025-01-26 | Stop reason: HOSPADM

## 2025-01-22 RX ADMIN — PROPOFOL 100 MG: 10 INJECTION, EMULSION INTRAVENOUS at 10:02

## 2025-01-22 RX ADMIN — LIDOCAINE HYDROCHLORIDE 5 ML: 10 INJECTION, SOLUTION EPIDURAL; INFILTRATION; INTRACAUDAL; PERINEURAL at 09:54

## 2025-01-22 RX ADMIN — PROPOFOL 100 MG: 10 INJECTION, EMULSION INTRAVENOUS at 09:56

## 2025-01-22 RX ADMIN — SODIUM CHLORIDE, SODIUM LACTATE, POTASSIUM CHLORIDE, AND CALCIUM CHLORIDE 125 ML/HR: .6; .31; .03; .02 INJECTION, SOLUTION INTRAVENOUS at 09:47

## 2025-01-22 RX ADMIN — SODIUM CHLORIDE, SODIUM LACTATE, POTASSIUM CHLORIDE, AND CALCIUM CHLORIDE: .6; .31; .03; .02 INJECTION, SOLUTION INTRAVENOUS at 09:04

## 2025-01-22 RX ADMIN — PROPOFOL 100 MG: 10 INJECTION, EMULSION INTRAVENOUS at 09:54

## 2025-01-22 NOTE — PERIOPERATIVE NURSING NOTE
Patient received from procedure team( RN and CRNA), vitals stable. bed is in lowest position , side rails up and locked. Call bell within reach. Plan of care in progress

## 2025-01-22 NOTE — ANESTHESIA POSTPROCEDURE EVALUATION
Post-Op Assessment Note    CV Status:  Stable         Mental Status:  Alert   PONV Controlled:  Controlled   Airway Patency:  Patent     Post Op Vitals Reviewed: Yes    No anethesia notable event occurred.    Staff: CRNA           Last Filed PACU Vitals:  Vitals Value Taken Time   Temp     Pulse 86    /69    Resp 20    SpO2 100

## 2025-01-22 NOTE — ANESTHESIA PREPROCEDURE EVALUATION
Procedure:  COLONOSCOPY    Relevant Problems   CARDIO   (+) Headache, variant migraine      NEURO/PSYCH   (+) Anxiety   (+) Depression   (+) Headache, variant migraine      PULMONARY   (+) COPD (chronic obstructive pulmonary disease) (HCC)   smoker     Physical Exam    Airway    Mallampati score: II  TM Distance: >3 FB  Neck ROM: full     Dental   Comment: Denies loose teeth     Cardiovascular  Cardiovascular exam normal    Pulmonary  Pulmonary exam normal     Other Findings  Portions of exam deferred due to low yield and/or unknown COVID status      Anesthesia Plan  ASA Score- 2     Anesthesia Type- IV sedation with anesthesia with ASA Monitors.         Additional Monitors:     Airway Plan:            Plan Factors-Exercise tolerance (METS): >4 METS.    Chart reviewed.   Existing labs reviewed. Patient summary reviewed.    Patient is a current smoker.              Induction- intravenous.    Postoperative Plan-         Informed Consent- Anesthetic plan and risks discussed with patient.  I personally reviewed this patient with the CRNA. Discussed and agreed on the Anesthesia Plan with the CRNA..      NPO Status:  Vitals Value Taken Time   Date of last liquid 01/22/25 01/22/25 0912   Time of last liquid 0430 01/22/25 0912   Date of last solid 01/20/25 01/22/25 0912   Time of last solid 2200 01/22/25 0912

## 2025-01-22 NOTE — H&P
"History and Physical -  [facility]  Humberto De Guzman 49 y.o. male MRN: [id number]        HPI: 49-year-old male with history of colon polyps.  Regular bowel movements.    Historical Information   Past Medical History:   Diagnosis Date    Allergic rhinitis due to allergen 10/01/2018    Anxiety     Chronic pain     COPD (chronic obstructive pulmonary disease) (HCC)     Depression 10/17/2016    Diarrhea in adult patient     Fibromyalgia     Hyperlipidemia     Pericarditis      Past Surgical History:   Procedure Laterality Date    EGD AND COLONOSCOPY      ELBOW ARTHROSCOPY      ELBOW SURGERY      x2    IR CHOLECYSTOSTOMY TUBE CHECK/CHANGE/REPOSITION/REINSERTION/UPSIZE  09/11/2023    IR CHOLECYSTOSTOMY TUBE PLACEMENT  07/28/2023    ID LAPAROSCOPY SURG CHOLECYSTECTOMY N/A 9/26/2023    Procedure: CHOLECYSTECTOMY LAPAROSCOPIC;  Surgeon: Nolberto Cooper MD;  Location: [facility];  Service: General    WRIST SURGERY       Social History   Social History     Substance and Sexual Activity   Alcohol Use Yes    Comment: occ     Social History     Substance and Sexual Activity   Drug Use Yes    Types: Marijuana    Comment: Is in pain mangement,  to get off pain meds.     Social History     Tobacco Use   Smoking Status Every Day    Current packs/day: 0.50    Average packs/day: 0.5 packs/day for 30.0 years (15.0 ttl pk-yrs)    Types: Cigarettes    Passive exposure: Never   Smokeless Tobacco Never     History reviewed. No pertinent family history.    Meds/Allergies     Not in a hospital admission.    Allergies   Allergen Reactions    Zosyn [Piperacillin Sod-Tazobactam So] Hives     Per pt, \"body turns red and elevated heart rate\"    Ceftriaxone Rash     Rash       Objective     Blood pressure 121/73, pulse 83, temperature 99.3 °F (37.4 °C), temperature source Temporal, resp. rate 18, height 6' 1\" (1.854 m), weight 93.9 kg (207 lb), SpO2 96%.    Physical Exam:    Chest- CTA  Heart- RRR  Abdomen- NT/ND  Extremities- " No edema    ASSESSMENT:     History of colon polyps    PLAN:    Colonoscopy

## 2025-01-24 ENCOUNTER — RESULTS FOLLOW-UP (OUTPATIENT)
Age: 50
End: 2025-01-24

## 2025-01-24 PROCEDURE — 88305 TISSUE EXAM BY PATHOLOGIST: CPT | Performed by: PATHOLOGY

## 2025-05-13 ENCOUNTER — OFFICE VISIT (OUTPATIENT)
Dept: FAMILY MEDICINE CLINIC | Facility: CLINIC | Age: 50
End: 2025-05-13
Payer: COMMERCIAL

## 2025-05-13 VITALS
DIASTOLIC BLOOD PRESSURE: 78 MMHG | RESPIRATION RATE: 16 BRPM | HEART RATE: 88 BPM | SYSTOLIC BLOOD PRESSURE: 144 MMHG | BODY MASS INDEX: 28.17 KG/M2 | TEMPERATURE: 97.3 F | HEIGHT: 72 IN | WEIGHT: 208 LBS

## 2025-05-13 DIAGNOSIS — E78.5 DYSLIPIDEMIA: ICD-10-CM

## 2025-05-13 DIAGNOSIS — M25.50 ARTHRALGIA, UNSPECIFIED JOINT: Primary | ICD-10-CM

## 2025-05-13 DIAGNOSIS — M79.7 FIBROMYALGIA: ICD-10-CM

## 2025-05-13 DIAGNOSIS — F41.9 ANXIETY: ICD-10-CM

## 2025-05-13 DIAGNOSIS — M79.10 MYALGIA: ICD-10-CM

## 2025-05-13 DIAGNOSIS — J44.9 CHRONIC OBSTRUCTIVE PULMONARY DISEASE, UNSPECIFIED COPD TYPE (HCC): ICD-10-CM

## 2025-05-13 PROBLEM — F17.200 SMOKER: Status: ACTIVE | Noted: 2025-05-13

## 2025-05-13 PROCEDURE — 99203 OFFICE O/P NEW LOW 30 MIN: CPT | Performed by: NURSE PRACTITIONER

## 2025-05-13 RX ORDER — ROSUVASTATIN CALCIUM 5 MG/1
5 TABLET, COATED ORAL DAILY
Qty: 90 TABLET | Refills: 1 | Status: SHIPPED | OUTPATIENT
Start: 2025-05-13

## 2025-05-13 RX ORDER — ROSUVASTATIN CALCIUM 5 MG/1
5 TABLET, COATED ORAL
COMMUNITY
Start: 2025-02-25 | End: 2025-05-13 | Stop reason: SDUPTHER

## 2025-05-13 RX ORDER — PANTOPRAZOLE SODIUM 40 MG/1
40 TABLET, DELAYED RELEASE ORAL 2 TIMES DAILY
COMMUNITY
Start: 2025-02-05

## 2025-05-13 RX ORDER — DULOXETIN HYDROCHLORIDE 20 MG/1
1 CAPSULE, DELAYED RELEASE ORAL DAILY
COMMUNITY
Start: 2025-04-30

## 2025-05-13 NOTE — PROGRESS NOTES
"Name: Humberto De Guzman      : 1975      MRN: [id number]  Encounter Provider: LAUREN Mendez  Encounter Date: 2025   Encounter department: [facility]  :  Assessment & Plan  Arthralgia, unspecified joint    Orders:  •  Ambulatory Referral to Rheumatology; Future    Myalgia    Orders:  •  Ambulatory Referral to Rheumatology; Future    Fibromyalgia    Orders:  •  Ambulatory Referral to Rheumatology; Future    Dyslipidemia    Orders:  •  rosuvastatin (CRESTOR) 5 mg tablet; Take 1 tablet (5 mg total) by mouth daily    Chronic obstructive pulmonary disease, unspecified COPD type (HCC)    Orders:  •  fluticasone-umeclidinium-vilanterol 100-62.5-25 mcg/actuation inhaler; Inhale 1 puff daily    Anxiety  On xanax and managed by psychatrist              History of Present Illness   New to practice.  Personal and family medical history reviewed  Patient stated that has h/o fibromyalgia from years and has chronic joint pains and muscle pain and been out of work since December on his own as not able to manage work. Needs referral for rheumatologist and provided  Also on duloxetine for fibromyalgia  Takes xanax for anxiety and under care of psychiatrist and informed that it needs to be filled by them.  Having left sided chest pain from couple of months and had been following with cardiologist and has testing and work up done which was unremarkable and possibly muscular as per cardiologist and will continue to follow with cardiologist        Review of Systems   Constitutional:  Positive for fatigue.   HENT: Negative.     Respiratory: Negative.     Cardiovascular: Negative.    Gastrointestinal: Negative.    Musculoskeletal:  Positive for arthralgias and myalgias.   Psychiatric/Behavioral:  The patient is nervous/anxious.        Objective   /78   Pulse 88   Temp (!) 97.3 °F (36.3 °C)   Resp 16   Ht 5' 11.75\" (1.822 m)   Wt 94.3 kg (208 lb)   BMI 28.41 kg/m²      Physical Exam  HENT:      " Head: Normocephalic.   Eyes:      Conjunctiva/sclera: Conjunctivae normal.   Cardiovascular:      Rate and Rhythm: Normal rate and regular rhythm.      Pulses: Normal pulses.      Heart sounds: Normal heart sounds.   Pulmonary:      Effort: Pulmonary effort is normal.      Breath sounds: Normal breath sounds.   Skin:     General: Skin is warm and dry.   Neurological:      Mental Status: He is alert and oriented to person, place, and time.   Psychiatric:         Mood and Affect: Mood normal.         Behavior: Behavior normal.         Thought Content: Thought content normal.         Judgment: Judgment normal.

## 2025-05-13 NOTE — PATIENT INSTRUCTIONS
Patient Education     Rosuvastatin ([facility] STAT in)   Brand Names: US Crestor; Ezallor Sprinkle   Brand Names: Joselo ACH-Rosuvastatin; AG-Rosuvastatin; AG-Rosuvastatin Calcium; APO-Rosuvastatin; Auro-Rosuvastatin; BIO-Rosuvastatin; Crestor; DOM-Rosuvastatin [DSC]; JAMP Rosuvastatin Calcium; JAMP-Rosuvastatin; M-Rosuvastatin; Mar-Rosuvastatin; MINT-Rosuvastatin; NRA-Rosuvastatin; PMS-Rosuvastatin; Priva-Rosuvastatin [DSC]; JOSAFAT-Rosuvastatin; DARLEEN-Rosuvastatin; SANDOZ Rosuvastatin; TARO-Rosuvastatin; TEVA-Rosuvastatin   What is this drug used for?   It is used to lower bad cholesterol and raise good cholesterol (HDL).  It is used to lower triglycerides.  It is used to slow the progress of heart disease.  It is used in some people to lower the chance of heart attack, stroke, and certain heart procedures.  It may be given to you for other reasons. Talk with the doctor.  What do I need to tell my doctor BEFORE I take this drug?   If you are allergic to this drug; any part of this drug; or any other drugs, foods, or substances. Tell your doctor about the allergy and what signs you had.  If you have liver disease or raised liver enzymes.  If you are taking any of these drugs: Gemfibrozil, ledipasvir/sofosbuvir, sofosbuvir/velpatasvir/voxilaprevir, or tafamidis.  If you are pregnant or may be pregnant. Do not take this drug if you are pregnant.  If you are breast-feeding. Do not breast-feed while you take this drug.  This is not a list of all drugs or health problems that interact with this drug.  Tell your doctor and pharmacist about all of your drugs (prescription or OTC, natural products, vitamins) and health problems. You must check to make sure that it is safe for you to take this drug with all of your drugs and health problems. Do not start, stop, or change the dose of any drug without checking with your doctor.  What are some things I need to know or do while I take this drug?   Tell all of your health care  providers that you take this drug. This includes your doctors, nurses, pharmacists, and dentists.  High blood sugar has happened with this drug. This includes diabetes that is new or worse.  Check your blood sugar as you have been told by your doctor.  Have blood work checked as you have been told by the doctor. Talk with the doctor.  Do not take more than what your doctor told you to take. Taking more than you are told may raise your chance of very bad side effects.  Follow the diet and workout plan that your doctor told you about.  Avoid or limit drinking alcohol to 2 drinks a day. Drinking too much alcohol may raise your chance of liver disease.  If you are taking an antacid that has aluminum or magnesium in it, take it at least 2 hours after taking this drug.  If you are of  descent, use this drug with care. You could have more side effects.  If you are 65 or older, use this drug with care. You could have more side effects.  This drug may cause harm to an unborn baby. If you may become pregnant, you must use birth control while taking this drug. If you get pregnant, call your doctor right away.  What are some side effects that I need to call my doctor about right away?   WARNING/CAUTION: Even though it may be rare, some people may have very bad and sometimes deadly side effects when taking a drug. Tell your doctor or get medical help right away if you have any of the following signs or symptoms that may be related to a very bad side effect:  Signs of an allergic reaction, like rash; hives; itching; red, swollen, blistered, or peeling skin with or without fever; wheezing; tightness in the chest or throat; trouble breathing, swallowing, or talking; unusual hoarseness; or swelling of the mouth, face, lips, tongue, or throat.  Signs of high blood sugar like confusion, feeling sleepy, unusual thirst or hunger, passing urine more often, flushing, fast breathing, or breath that smells like fruit.  Memory problems  or loss.  Feeling confused.  Blood in the urine.  Not able to pass urine or change in how much urine is passed.  Blurred eyesight, seeing double, or other changes in eyesight.  Eyelid droop.  This drug may cause muscle pain, tenderness, or weakness. The risk may be raised if you have low thyroid function or kidney problems. It may also be raised if you take this drug with certain other drugs, or if you are 65 or older. Sometimes, a severe muscle problem may lead to kidney problems. Rarely, deaths have happened. Call your doctor right away if you have abnormal muscle pain, tenderness, or weakness (with or without fever or feeling out of sorts). Call your doctor right away if muscle problems last after your doctor has told you to stop taking this drug.  Liver problems have happened with drugs like this one. Sometimes, this has been deadly. Call your doctor right away if you have signs of liver problems like dark urine, tiredness, decreased appetite, upset stomach or stomach pain, light-colored stools, throwing up, or yellow skin or eyes.  What are some other side effects of this drug?   All drugs may cause side effects. However, many people have no side effects or only have minor side effects. Call your doctor or get medical help if any of these side effects or any other side effects bother you or do not go away:  Headache.  Stomach pain.  Upset stomach.  Constipation.  Joint pain.  Weakness.  These are not all of the side effects that may occur. If you have questions about side effects, call your doctor. Call your doctor for medical advice about side effects.  You may report side effects to your national health agency.  You may report side effects to the FDA at 1-316.463.2926. You may also report side effects at https://www.fda.gov/medwatch.  How is this drug best taken?   Use this drug as ordered by your doctor. Read all information given to you. Follow all instructions closely.  All products:   Take this drug at  the same time of day.  Keep taking this drug as you have been told by your doctor or other health care provider, even if you feel well.  Take with or without food.  Tablets:   Swallow whole with some water or other drink.  Sprinkle capsules :  Swallow whole. Do not chew or crush.  If you cannot swallow this drug whole, you may sprinkle the contents on a small amount (at least 5 mL) of soft food like applesauce or pudding. Stir for 10 to 15 seconds. Swallow the mixture within 60 minutes. Do not chew the mixture. Do not store for future use.  Those who have feeding tubes may use this drug. Use as you have been told. Flush the feeding tube after this drug is given.  What do I do if I miss a dose?   Skip the missed dose and go back to your normal time.  Do not take 2 doses at the same time or extra doses.  How do I store and/or throw out this drug?   Store at room temperature in a dry place. Do not store in a bathroom.  Keep all drugs in a safe place. Keep all drugs out of the reach of children and pets.  Throw away unused or  drugs. Do not flush down a toilet or pour down a drain unless you are told to do so. Check with your pharmacist if you have questions about the best way to throw out drugs. There may be drug take-back programs in your area.  General drug facts   If your symptoms or health problems do not get better or if they become worse, call your doctor.  Do not share your drugs with others and do not take anyone else's drugs.  Some drugs may have another patient information leaflet. If you have any questions about this drug, please talk with your doctor, nurse, pharmacist, or other health care provider.  Some drugs may have another patient information leaflet. Check with your pharmacist. If you have any questions about this drug, please talk with your doctor, nurse, pharmacist, or other health care provider.  If you think there has been an overdose, call your poison control center or get medical care  right away. Be ready to tell or show what was taken, how much, and when it happened.  Consumer Information Use and Disclaimer   This generalized information is a limited summary of diagnosis, treatment, and/or medication information. It is not meant to be comprehensive and should be used as a tool to help the user understand and/or assess potential diagnostic and treatment options. It does NOT include all information about conditions, treatments, medications, side effects, or risks that may apply to a specific patient. It is not intended to be medical advice or a substitute for the medical advice, diagnosis, or treatment of a health care provider based on the health care provider's examination and assessment of a patient's specific and unique circumstances. Patients must speak with a health care provider for complete information about their health, medical questions, and treatment options, including any risks or benefits regarding use of medications. This information does not endorse any treatments or medications as safe, effective, or approved for treating a specific patient. Up[employer], Inc[employer] and its affiliates disclaim any warranty or liability relating to this information or the use thereof. The use of this information is governed by the Terms of Use, available at https://www.woltersYouEyeuwer.com/en/know/clinical-effectiveness-terms.  Last Reviewed Date   2024-04-08  Copyright   © 2024 UpToDate, Inc. and its affiliates and/or licensors. All rights reserved.

## 2025-05-13 NOTE — ASSESSMENT & PLAN NOTE
Orders:  •  fluticasone-umeclidinium-vilanterol 100-62.5-25 mcg/actuation inhaler; Inhale 1 puff daily

## 2025-05-14 ENCOUNTER — TELEPHONE (OUTPATIENT)
Age: 50
End: 2025-05-14

## 2025-05-14 NOTE — TELEPHONE ENCOUNTER
Patient called to schedule an appointment with Dr. Calderón as his family doctor recommended her. I advised patient that Dr. Calderón would not be available until December. Patient states he cannot wait that long. I explained our one time provider switch rule to the patient so he is aware. Patient has been scheduled in our [address] office with Dr. Cho. I provided our [facility] group npi and tax id to the patient and advised him to call his insurance plans to determine his eligibility and to see if he is in network with our [place] office locations. Patient verbalized calling and was encouraged to call us if there are any issues with his insurance.      Thank you.

## 2025-05-20 NOTE — PROGRESS NOTES
Name: Humberto De Guzman      : 1975      MRN: [id number]  Encounter Provider: Katie Cho DO  Encounter Date: 2025   Encounter department: [facility] [address]  :  Assessment & Plan  Fibromyalgia  Patient presents for evaluation of arthralgias and myalgias diffusely that have been ongoing for several years but seem to be more bothersome for him in the recent couple of months.  He has several tender points on exam with light touch of the muscles, which does seem to be consistent with fibromyalgia.  His joint pains are more concentrated in the knees/hips and ankles and are provoked by activity, which seems to be more consistent with an OA related pain.  He denies any swollen tender joints, or prolonged morning stiffness, therefore less likely any underlying inflammatory arthritis or other connective tissue disease.  He was started on Cymbalta 3 weeks ago and has not noticed much of a difference.  Plan  Recommend to continue the Cymbalta and increase the dose as tolerated -his psychiatrist is managing this medication for him  If increased dose of Cymbalta is not effective, can try other medications like gabapentin, pregabalin, or amitriptyline.  PCP and psychiatrist to manage fibromyalgia further  Referral to physical therapy for aqua therapy  Recommend low impact exercise, yoga, yissel chi, acupuncture  No further workup at this time  No need to follow-up with rheumatology  Orders:    Ambulatory Referral to Physical Therapy; Future        Pertinent Medical History           History of Present Illness   Humberto De Guzman is a 49 y.o. male with a history of headaches, COPD, depression, anxiety who presents for arthralgias, myalgias and fibromyalgia.      Patient was referred to us from PCP due to having history of fibromyalgia for many years (8 years) with chronic muscle and joint pains.      He wakes up feeling stiff all over in the morning, lasts 30 minutes.    He currently has pain in  "the lower back/hips, knees and ankles. No swelling of any of his joints. He does take Advil 2-3 daily and does not help much. His joint pain is worse with activity, better with rest.    He is now more predominantly complaining of all over muscle aches. He cannot lift more than 20 lbs at the gym because the muscles are sore.     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      He was just recently prescribed Cymbalta 20 mg daily but only been on it for 3 weeks, so not sure if it is helping yet.     He is also having fatigue, poor sleep from muscle pain.     He has tried gabapentin in the past, no side effects.       Denies weight changes, hair loss, vision changes, erythema or pain in the eyes, sicca symptoms, ulcers in the mouth/genital area, swollen glands, dysphagia, GERD, rashes (including malar, psoriasis, photosensitivity, skin tightening/thickening), bumps under the skin (calcinosis, tophi, RA nodules), Raynaud's, morning stiffness, swollen/tender joints, SOB, pleuritic chest pain, abdominal pain, changes in the stool,  bloody or foamy urine, LE edema, muscle weakness, neuropathy, joint hypermobility     Autoimmune family history- may have RA in some uncle/aunts    Denies history of DVT or PE, seizures or strokes.    HPI       Review of Systems   Constitutional: Negative.    HENT: Negative.     Eyes: Negative.    Respiratory: Negative.     Cardiovascular: Negative.    Gastrointestinal: Negative.    Genitourinary: Negative.    Musculoskeletal:  Positive for arthralgias, back pain and myalgias.   Skin: Negative.            Objective   /80 (BP Location: Left arm, Patient Position: Sitting, Cuff Size: Standard)   Pulse 73   Temp 97.6 °F (36.4 °C) (Tympanic)   Ht 5' 11.75\" (1.822 m)   Wt 94.3 kg (208 lb)   SpO2 98%   BMI 28.41 kg/m²     Physical Exam  Constitutional:       Appearance: Normal appearance.   HENT:      Head: " "Normocephalic.     Eyes:      Extraocular Movements: Extraocular movements intact.      Pupils: Pupils are equal, round, and reactive to light.       Cardiovascular:      Pulses: Normal pulses.      Heart sounds: Normal heart sounds.   Pulmonary:      Effort: Pulmonary effort is normal.      Breath sounds: Normal breath sounds.     Musculoskeletal:      Comments: MSK Exam  The following areas have been examined today and do not show any signs of synovitis, tenderness, or restricted ROM unless otherwise specified below:  Neck  Shoulders  Back  Elbows  Wrists  Hands  Hips  Knees  Ankles  Feet     Tenderness in the muscles upon light touch diffusely in the upper and lower extremities, neck shoulder area, and chest     Neurological:      Mental Status: He is alert and oriented to person, place, and time.         Recent labs:  Lab Results   Component Value Date/Time    SODIUM 139 11/18/2024 01:49 PM    K 3.9 11/18/2024 01:49 PM    BUN 10 11/18/2024 01:49 PM    CREATININE 1.00 11/18/2024 01:49 PM    GLUC 91 11/18/2024 01:49 PM    CALCIUM 9.2 11/18/2024 01:49 PM    AST 22 11/18/2024 01:49 PM    ALT 28 11/18/2024 01:49 PM    ALB 4.4 11/18/2024 01:49 PM    TP 7.0 11/18/2024 01:49 PM    EGFR 89 11/18/2024 01:49 PM     Lab Results   Component Value Date/Time    HGB 16.1 11/18/2024 01:49 PM    WBC 7.73 11/18/2024 01:49 PM     11/18/2024 01:49 PM    INR 0.91 05/23/2024 12:08 PM    PTT 28 05/23/2024 12:08 PM     No results found for: \"[id number]\"    ZULAY negative  Ferritin normal        "

## 2025-05-21 ENCOUNTER — CONSULT (OUTPATIENT)
Age: 50
End: 2025-05-21

## 2025-05-21 VITALS
WEIGHT: 208 LBS | HEART RATE: 73 BPM | BODY MASS INDEX: 28.17 KG/M2 | TEMPERATURE: 97.6 F | HEIGHT: 72 IN | DIASTOLIC BLOOD PRESSURE: 80 MMHG | SYSTOLIC BLOOD PRESSURE: 146 MMHG | OXYGEN SATURATION: 98 %

## 2025-05-21 DIAGNOSIS — M79.7 FIBROMYALGIA: Primary | ICD-10-CM

## 2025-05-22 ENCOUNTER — OFFICE VISIT (OUTPATIENT)
Dept: FAMILY MEDICINE CLINIC | Facility: CLINIC | Age: 50
End: 2025-05-22
Payer: COMMERCIAL

## 2025-05-22 VITALS
TEMPERATURE: 97.4 F | SYSTOLIC BLOOD PRESSURE: 126 MMHG | WEIGHT: 210.6 LBS | BODY MASS INDEX: 28.53 KG/M2 | HEIGHT: 72 IN | HEART RATE: 84 BPM | RESPIRATION RATE: 18 BRPM | DIASTOLIC BLOOD PRESSURE: 76 MMHG

## 2025-05-22 DIAGNOSIS — M79.7 FIBROMYALGIA: Primary | ICD-10-CM

## 2025-05-22 PROCEDURE — 99213 OFFICE O/P EST LOW 20 MIN: CPT | Performed by: NURSE PRACTITIONER

## 2025-05-22 NOTE — PROGRESS NOTES
"Name: Humberto De Guzman      : 1975      MRN: [id number]  Encounter Provider: LAUREN Mendez  Encounter Date: 2025   Encounter department: [facility]  :  Assessment & Plan  Fibromyalgia                History of Present Illness   Patient stated that had follow up with Rheumatologist and was told to start aqua therapy and will talk to his psychiatrist to increase his duloxetine. Rheumatologist did not put patient out of work and I discussed with patient that I will not sign any disability related to fibromyalgia and if he thinks that his reason to be out of work then needs to be sign by specialist and that no forms of this nature would be promised or completed.  This was explained clearly to the patient's understanding.   Stated that will talk to his psychiatrist and if he will not approve his disability either then will call back office for note to resume work.          Review of Systems   HENT: Negative.     Respiratory: Negative.     Cardiovascular: Negative.    Musculoskeletal:  Positive for arthralgias.       Objective   /76   Pulse 84   Temp (!) 97.4 °F (36.3 °C)   Resp 18   Ht 5' 11.75\" (1.822 m)   Wt 95.5 kg (210 lb 9.6 oz)   BMI 28.76 kg/m²      Physical Exam  HENT:      Head: Normocephalic.     Eyes:      Conjunctiva/sclera: Conjunctivae normal.       Cardiovascular:      Rate and Rhythm: Normal rate and regular rhythm.      Pulses: Normal pulses.      Heart sounds: Normal heart sounds.   Pulmonary:      Effort: Pulmonary effort is normal.      Breath sounds: Normal breath sounds.     Skin:     General: Skin is warm and dry.     Neurological:      Mental Status: He is alert and oriented to person, place, and time.     Psychiatric:         Mood and Affect: Mood normal.         Behavior: Behavior normal.         Thought Content: Thought content normal.         Judgment: Judgment normal.         "

## 2025-05-22 NOTE — LETTER
May 22, 2025     Patient: Humberto De Guzman  YOB: 1975  Date of Visit: 5/22/2025      To Whom it May Concern:    Humberto De Guzman is under my professional care. Humberto was seen in my office on 5/22/2025. Humberto {Return to school/sport/work:[id number]}.    If you have any questions or concerns, please don't hesitate to call.         Sincerely,          LAUREN Mendez        CC: No Recipients

## 2025-05-27 ENCOUNTER — TELEPHONE (OUTPATIENT)
Age: 50
End: 2025-05-27

## 2025-05-27 NOTE — LETTER
May 27, 2025     Patient: Humberto De Guzman  YOB: 1975        To Whom it May Concern:    Humberto De Guzman is under my professional care.  Humberto may return to work on 5/29/25.    If you have any questions or concerns, please don't hesitate to call.         Sincerely,        Cortney Reece          CC: No Recipients

## 2025-05-27 NOTE — TELEPHONE ENCOUNTER
Patient needs a return to work note.  His boss stated he can return on Thursday, May 29,2025  Please put letter in Mychart for patient.    Thank you

## 2025-06-21 ENCOUNTER — OFFICE VISIT (OUTPATIENT)
Dept: URGENT CARE | Facility: CLINIC | Age: 50
End: 2025-06-21
Payer: COMMERCIAL

## 2025-06-21 VITALS
BODY MASS INDEX: 28 KG/M2 | HEART RATE: 97 BPM | RESPIRATION RATE: 14 BRPM | WEIGHT: 205 LBS | TEMPERATURE: 98.7 F | OXYGEN SATURATION: 97 %

## 2025-06-21 DIAGNOSIS — L23.9 ALLERGIC CONTACT DERMATITIS, UNSPECIFIED TRIGGER: Primary | ICD-10-CM

## 2025-06-21 PROCEDURE — 99213 OFFICE O/P EST LOW 20 MIN: CPT | Performed by: PHYSICIAN ASSISTANT

## 2025-06-21 RX ORDER — DULOXETINE 40 MG/1
1 CAPSULE, DELAYED RELEASE ORAL DAILY
COMMUNITY
Start: 2025-05-25

## 2025-06-21 RX ORDER — PREDNISONE 50 MG/1
50 TABLET ORAL DAILY
Qty: 5 TABLET | Refills: 0 | Status: SHIPPED | OUTPATIENT
Start: 2025-06-21 | End: 2025-06-26

## 2025-08-05 ENCOUNTER — OFFICE VISIT (OUTPATIENT)
Dept: FAMILY MEDICINE CLINIC | Facility: CLINIC | Age: 50
End: 2025-08-05
Payer: COMMERCIAL

## 2025-08-05 VITALS
DIASTOLIC BLOOD PRESSURE: 82 MMHG | BODY MASS INDEX: 27.92 KG/M2 | TEMPERATURE: 96.8 F | OXYGEN SATURATION: 98 % | WEIGHT: 204.4 LBS | RESPIRATION RATE: 18 BRPM | HEART RATE: 85 BPM | SYSTOLIC BLOOD PRESSURE: 120 MMHG

## 2025-08-05 DIAGNOSIS — J44.9 CHRONIC OBSTRUCTIVE PULMONARY DISEASE, UNSPECIFIED COPD TYPE (HCC): ICD-10-CM

## 2025-08-05 DIAGNOSIS — J02.9 ACUTE PHARYNGITIS, UNSPECIFIED ETIOLOGY: Primary | ICD-10-CM

## 2025-08-05 PROCEDURE — 99213 OFFICE O/P EST LOW 20 MIN: CPT | Performed by: NURSE PRACTITIONER

## 2025-08-05 RX ORDER — CLARITHROMYCIN 500 MG/1
500 TABLET ORAL 2 TIMES DAILY
Qty: 20 TABLET | Refills: 0 | Status: SHIPPED | OUTPATIENT
Start: 2025-08-05 | End: 2025-08-15

## 2025-08-05 RX ORDER — PREDNISONE 10 MG/1
TABLET ORAL
Qty: 20 TABLET | Refills: 0 | Status: SHIPPED | OUTPATIENT
Start: 2025-08-05 | End: 2025-08-15

## 2025-08-19 ENCOUNTER — TELEPHONE (OUTPATIENT)
Age: 50
End: 2025-08-19

## 2025-08-19 DIAGNOSIS — E78.5 DYSLIPIDEMIA: ICD-10-CM

## 2025-08-19 DIAGNOSIS — E78.5 DYSLIPIDEMIA: Primary | ICD-10-CM

## 2025-08-21 RX ORDER — ROSUVASTATIN CALCIUM 5 MG/1
5 TABLET, COATED ORAL DAILY
Qty: 90 TABLET | Refills: 1 | Status: SHIPPED | OUTPATIENT
Start: 2025-08-21

## (undated) DEVICE — GLOVE INDICATOR PI UNDERGLOVE SZ 7.5 BLUE

## (undated) DEVICE — DRAPE LAPAROTOMY W/POUCHES

## (undated) DEVICE — DRAPE EQUIPMENT RF WAND

## (undated) DEVICE — DRAPE UTILITY

## (undated) DEVICE — SURGIFLO ENDOSCOPIC APPICATOR: Brand: ETHICON

## (undated) DEVICE — SUT ETHILON 3-0 FSL 30 IN 1671H

## (undated) DEVICE — PACK GENERAL LF

## (undated) DEVICE — ASTOUND STANDARD SURGICAL GOWN, XL: Brand: CONVERTORS

## (undated) DEVICE — TUBING SMOKE EVAC W/FILTRATION DEVICE PLUMEPORT ACTIV

## (undated) DEVICE — SHEARS: Brand: ENDO MINI-SHEARS

## (undated) DEVICE — SPONGE GAUZE 4 X 8 12 PLY STRL LF

## (undated) DEVICE — GLOVE SRG BIOGEL 7

## (undated) DEVICE — 2, DISPOSABLE SUCTION/IRRIGATOR WITHOUT DISPOSABLE TIP: Brand: STRYKEFLOW

## (undated) DEVICE — CHLORAPREP HI-LITE 26ML ORANGE

## (undated) DEVICE — BASIC DOUBLE BASIN 2-LF: Brand: MEDLINE INDUSTRIES, INC.

## (undated) DEVICE — ANTI-FOG SOLUTION WITH FOAM PAD: Brand: DEVON

## (undated) DEVICE — 3M™ TEGADERM™ TRANSPARENT FILM DRESSING FRAME STYLE, 1626W, 4 IN X 4-3/4 IN (10 CM X 12 CM), 50/CT 4CT/CASE: Brand: 3M™ TEGADERM™

## (undated) DEVICE — TISSUE RETRIEVAL SYSTEM: Brand: INZII RETRIEVAL SYSTEM

## (undated) DEVICE — 3M™ STERI-STRIP™ REINFORCED ADHESIVE SKIN CLOSURES, R1547, 1/2 IN X 4 IN (12 MM X 100 MM), 6 STRIPS/ENVELOPE: Brand: 3M™ STERI-STRIP™

## (undated) DEVICE — SUT MONOCRYL 4-0 PC-5 18 IN Y823G

## (undated) DEVICE — TROCARS: Brand: KII® BALLOON BLUNT TIP SYSTEM

## (undated) DEVICE — TROCAR: Brand: KII SLEEVE

## (undated) DEVICE — SUT VICRYL PLUS 0 CT-3 27 IN VCP329H

## (undated) DEVICE — INTENDED FOR TISSUE SEPARATION, AND OTHER PROCEDURES THAT REQUIRE A SHARP SURGICAL BLADE TO PUNCTURE OR CUT.: Brand: BARD-PARKER SAFETY BLADES SIZE 15, STERILE

## (undated) DEVICE — LATEX FREE 10 FT  INSUFFLATION TUBING, COLDER CONNECTOR WITH 1 MICRON FILTER STERILE ONE TIME USE, 20 U: Brand: SURGICAL DIRECT

## (undated) DEVICE — HEMOSTATIC MATRIX SURGIFLO 8ML W/THROMBIN

## (undated) DEVICE — TROCAR: Brand: KII FIOS FIRST ENTRY

## (undated) DEVICE — CLIP APPLIER: Brand: ENDO CLIP II

## (undated) DEVICE — TOWEL SURG XR DETECT GREEN STRL RFD

## (undated) DEVICE — 3M™ TEGADERM™ TRANSPARENT FILM DRESSING FRAME STYLE, 1624W, 2-3/8 IN X 2-3/4 IN (6 CM X 7 CM), 100/CT 4CT/CASE: Brand: 3M™ TEGADERM™

## (undated) DEVICE — ALL PURPOSE SPONGES,NON-WOVEN, 3 PLY: Brand: CURITY